# Patient Record
Sex: FEMALE | Race: WHITE | NOT HISPANIC OR LATINO | Employment: OTHER
[De-identification: names, ages, dates, MRNs, and addresses within clinical notes are randomized per-mention and may not be internally consistent; named-entity substitution may affect disease eponyms.]

---

## 2017-02-09 ENCOUNTER — RECORDS - HEALTHEAST (OUTPATIENT)
Dept: ADMINISTRATIVE | Facility: OTHER | Age: 57
End: 2017-02-09

## 2017-03-22 ENCOUNTER — COMMUNICATION - HEALTHEAST (OUTPATIENT)
Dept: INTERNAL MEDICINE | Facility: CLINIC | Age: 57
End: 2017-03-22

## 2017-03-22 DIAGNOSIS — E03.9 HYPOTHYROIDISM: ICD-10-CM

## 2017-04-04 ENCOUNTER — COMMUNICATION - HEALTHEAST (OUTPATIENT)
Dept: INTERNAL MEDICINE | Facility: CLINIC | Age: 57
End: 2017-04-04

## 2017-04-05 ENCOUNTER — COMMUNICATION - HEALTHEAST (OUTPATIENT)
Dept: INTERNAL MEDICINE | Facility: CLINIC | Age: 57
End: 2017-04-05

## 2017-04-05 ENCOUNTER — OFFICE VISIT - HEALTHEAST (OUTPATIENT)
Dept: INTERNAL MEDICINE | Facility: CLINIC | Age: 57
End: 2017-04-05

## 2017-04-05 DIAGNOSIS — R10.2 PELVIC PAIN IN FEMALE: ICD-10-CM

## 2017-04-05 DIAGNOSIS — R14.0 BLOATED ABDOMEN: ICD-10-CM

## 2017-04-05 DIAGNOSIS — R30.0 DYSURIA: ICD-10-CM

## 2017-04-05 ASSESSMENT — MIFFLIN-ST. JEOR: SCORE: 1346.99

## 2017-04-21 ENCOUNTER — RECORDS - HEALTHEAST (OUTPATIENT)
Dept: ADMINISTRATIVE | Facility: OTHER | Age: 57
End: 2017-04-21

## 2017-04-21 LAB
HPV_EXT - HISTORICAL: NORMAL
PAP SMEAR - HIM PATIENT REPORTED: NORMAL

## 2017-04-23 ENCOUNTER — RECORDS - HEALTHEAST (OUTPATIENT)
Dept: ADMINISTRATIVE | Facility: OTHER | Age: 57
End: 2017-04-23

## 2017-05-22 ENCOUNTER — RECORDS - HEALTHEAST (OUTPATIENT)
Dept: ADMINISTRATIVE | Facility: OTHER | Age: 57
End: 2017-05-22

## 2017-06-19 ENCOUNTER — COMMUNICATION - HEALTHEAST (OUTPATIENT)
Dept: INTERNAL MEDICINE | Facility: CLINIC | Age: 57
End: 2017-06-19

## 2017-06-19 DIAGNOSIS — E03.9 HYPOTHYROIDISM: ICD-10-CM

## 2017-08-21 ENCOUNTER — COMMUNICATION - HEALTHEAST (OUTPATIENT)
Dept: INTERNAL MEDICINE | Facility: CLINIC | Age: 57
End: 2017-08-21

## 2017-08-21 DIAGNOSIS — B00.1 COLD SORE: ICD-10-CM

## 2017-10-03 ENCOUNTER — COMMUNICATION - HEALTHEAST (OUTPATIENT)
Dept: UROLOGY | Facility: CLINIC | Age: 57
End: 2017-10-03

## 2017-10-03 ENCOUNTER — HOSPITAL ENCOUNTER (OUTPATIENT)
Dept: CT IMAGING | Facility: HOSPITAL | Age: 57
Discharge: HOME | End: 2017-10-03
Attending: PHYSICIAN ASSISTANT

## 2017-10-03 ENCOUNTER — COMMUNICATION - HEALTHEAST (OUTPATIENT)
Dept: INTERNAL MEDICINE | Facility: CLINIC | Age: 57
End: 2017-10-03

## 2017-10-03 DIAGNOSIS — N20.9 URINARY TRACT STONES: ICD-10-CM

## 2017-10-03 DIAGNOSIS — B00.1 COLD SORE: ICD-10-CM

## 2017-12-11 ENCOUNTER — COMMUNICATION - HEALTHEAST (OUTPATIENT)
Dept: INTERNAL MEDICINE | Facility: CLINIC | Age: 57
End: 2017-12-11

## 2017-12-11 DIAGNOSIS — E03.9 HYPOTHYROIDISM: ICD-10-CM

## 2017-12-30 ENCOUNTER — COMMUNICATION - HEALTHEAST (OUTPATIENT)
Dept: INTERNAL MEDICINE | Facility: CLINIC | Age: 57
End: 2017-12-30

## 2017-12-30 DIAGNOSIS — E03.9 HYPOTHYROIDISM: ICD-10-CM

## 2017-12-30 DIAGNOSIS — B00.1 COLD SORE: ICD-10-CM

## 2018-01-04 ENCOUNTER — COMMUNICATION - HEALTHEAST (OUTPATIENT)
Dept: INTERNAL MEDICINE | Facility: CLINIC | Age: 58
End: 2018-01-04

## 2018-01-08 ENCOUNTER — OFFICE VISIT - HEALTHEAST (OUTPATIENT)
Dept: INTERNAL MEDICINE | Facility: CLINIC | Age: 58
End: 2018-01-08

## 2018-01-08 DIAGNOSIS — M25.552 PAIN OF BOTH HIP JOINTS: ICD-10-CM

## 2018-01-08 DIAGNOSIS — E03.9 HYPOTHYROIDISM: ICD-10-CM

## 2018-01-08 DIAGNOSIS — M79.645 PAIN IN FINGER OF BOTH HANDS: ICD-10-CM

## 2018-01-08 DIAGNOSIS — M79.644 PAIN IN FINGER OF BOTH HANDS: ICD-10-CM

## 2018-01-08 DIAGNOSIS — Z12.31 VISIT FOR SCREENING MAMMOGRAM: ICD-10-CM

## 2018-01-08 DIAGNOSIS — M79.673 FOOT PAIN: ICD-10-CM

## 2018-01-08 DIAGNOSIS — M25.551 PAIN OF BOTH HIP JOINTS: ICD-10-CM

## 2018-01-08 LAB
ERYTHROCYTE [DISTWIDTH] IN BLOOD BY AUTOMATED COUNT: 12.6 % (ref 11–14.5)
ERYTHROCYTE [SEDIMENTATION RATE] IN BLOOD BY WESTERGREN METHOD: 10 MM/HR (ref 0–20)
HCT VFR BLD AUTO: 44.8 % (ref 35–47)
HGB BLD-MCNC: 14.8 G/DL (ref 12–16)
MCH RBC QN AUTO: 30.5 PG (ref 27–34)
MCHC RBC AUTO-ENTMCNC: 33.1 G/DL (ref 32–36)
MCV RBC AUTO: 92 FL (ref 80–100)
PLATELET # BLD AUTO: 205 THOU/UL (ref 140–440)
PMV BLD AUTO: 8.2 FL (ref 7–10)
RBC # BLD AUTO: 4.86 MILL/UL (ref 3.8–5.4)
WBC: 9.3 THOU/UL (ref 4–11)

## 2018-01-08 ASSESSMENT — MIFFLIN-ST. JEOR: SCORE: 1419.57

## 2018-01-09 LAB
C REACTIVE PROTEIN LHE: 0.3 MG/DL (ref 0–0.8)
CCP AB SER IA-ACNC: 0.7 U/ML
RHEUMATOID FACT SERPL-ACNC: <15 IU/ML (ref 0–30)
T4 FREE SERPL-MCNC: 0.9 NG/DL (ref 0.7–1.8)
TSH SERPL DL<=0.005 MIU/L-ACNC: 0.8 UIU/ML (ref 0.3–5)
URATE SERPL-MCNC: 5.4 MG/DL (ref 2–7.5)

## 2018-01-10 ENCOUNTER — COMMUNICATION - HEALTHEAST (OUTPATIENT)
Dept: INTERNAL MEDICINE | Facility: CLINIC | Age: 58
End: 2018-01-10

## 2018-01-11 ENCOUNTER — RECORDS - HEALTHEAST (OUTPATIENT)
Dept: ADMINISTRATIVE | Facility: OTHER | Age: 58
End: 2018-01-11

## 2018-01-12 ENCOUNTER — HOSPITAL ENCOUNTER (OUTPATIENT)
Dept: MAMMOGRAPHY | Facility: HOSPITAL | Age: 58
Discharge: HOME | End: 2018-01-12
Attending: INTERNAL MEDICINE

## 2018-01-12 DIAGNOSIS — Z12.31 VISIT FOR SCREENING MAMMOGRAM: ICD-10-CM

## 2018-01-16 ENCOUNTER — RECORDS - HEALTHEAST (OUTPATIENT)
Dept: ADMINISTRATIVE | Facility: OTHER | Age: 58
End: 2018-01-16

## 2018-01-22 ENCOUNTER — HOSPITAL ENCOUNTER (OUTPATIENT)
Dept: MRI IMAGING | Facility: HOSPITAL | Age: 58
Discharge: HOME | End: 2018-01-22

## 2018-01-22 DIAGNOSIS — M25.552 LEFT HIP PAIN: ICD-10-CM

## 2018-01-24 ENCOUNTER — AMBULATORY - HEALTHEAST (OUTPATIENT)
Dept: SCHEDULING | Facility: CLINIC | Age: 58
End: 2018-01-24

## 2018-01-24 ENCOUNTER — RECORDS - HEALTHEAST (OUTPATIENT)
Dept: ADMINISTRATIVE | Facility: OTHER | Age: 58
End: 2018-01-24

## 2018-01-24 DIAGNOSIS — M84.352A STRESS FRACTURE OF LEFT HIP: ICD-10-CM

## 2018-02-08 ENCOUNTER — COMMUNICATION - HEALTHEAST (OUTPATIENT)
Dept: INTERNAL MEDICINE | Facility: CLINIC | Age: 58
End: 2018-02-08

## 2018-02-12 ENCOUNTER — AMBULATORY - HEALTHEAST (OUTPATIENT)
Dept: LAB | Facility: CLINIC | Age: 58
End: 2018-02-12

## 2018-02-12 DIAGNOSIS — M25.551 RIGHT HIP PAIN: ICD-10-CM

## 2018-02-13 LAB — 25(OH)D3 SERPL-MCNC: 16.9 NG/ML (ref 30–80)

## 2018-02-20 ENCOUNTER — RECORDS - HEALTHEAST (OUTPATIENT)
Dept: ADMINISTRATIVE | Facility: OTHER | Age: 58
End: 2018-02-20

## 2018-02-20 ENCOUNTER — COMMUNICATION - HEALTHEAST (OUTPATIENT)
Dept: INTERNAL MEDICINE | Facility: CLINIC | Age: 58
End: 2018-02-20

## 2018-02-26 ENCOUNTER — COMMUNICATION - HEALTHEAST (OUTPATIENT)
Dept: INTERNAL MEDICINE | Facility: CLINIC | Age: 58
End: 2018-02-26

## 2018-02-28 ENCOUNTER — COMMUNICATION - HEALTHEAST (OUTPATIENT)
Dept: INTERNAL MEDICINE | Facility: CLINIC | Age: 58
End: 2018-02-28

## 2018-02-28 ENCOUNTER — HOSPITAL ENCOUNTER (OUTPATIENT)
Dept: MRI IMAGING | Facility: HOSPITAL | Age: 58
Discharge: HOME | End: 2018-02-28

## 2018-02-28 DIAGNOSIS — M25.552 LEFT HIP PAIN: ICD-10-CM

## 2018-03-02 ENCOUNTER — HOSPITAL ENCOUNTER (OUTPATIENT)
Dept: CT IMAGING | Facility: HOSPITAL | Age: 58
Discharge: HOME | End: 2018-03-02

## 2018-03-02 ENCOUNTER — RECORDS - HEALTHEAST (OUTPATIENT)
Dept: ADMINISTRATIVE | Facility: OTHER | Age: 58
End: 2018-03-02

## 2018-03-02 DIAGNOSIS — M84.352A STRESS FRACTURE OF LEFT HIP: ICD-10-CM

## 2018-03-05 ENCOUNTER — COMMUNICATION - HEALTHEAST (OUTPATIENT)
Dept: INTERNAL MEDICINE | Facility: CLINIC | Age: 58
End: 2018-03-05

## 2018-03-05 ENCOUNTER — RECORDS - HEALTHEAST (OUTPATIENT)
Dept: ADMINISTRATIVE | Facility: OTHER | Age: 58
End: 2018-03-05

## 2018-03-06 ENCOUNTER — OFFICE VISIT - HEALTHEAST (OUTPATIENT)
Dept: INTERNAL MEDICINE | Facility: CLINIC | Age: 58
End: 2018-03-06

## 2018-03-06 DIAGNOSIS — Z01.818 PRE-OP EXAMINATION: ICD-10-CM

## 2018-03-06 DIAGNOSIS — M85.80 OSTEOPENIA: ICD-10-CM

## 2018-03-06 DIAGNOSIS — M84.352A STRESS FRACTURE OF LEFT HIP: ICD-10-CM

## 2018-03-06 DIAGNOSIS — E03.9 HYPOTHYROIDISM: ICD-10-CM

## 2018-03-06 LAB
ANION GAP SERPL CALCULATED.3IONS-SCNC: 9 MMOL/L (ref 5–18)
ATRIAL RATE - MUSE: 85 BPM
BUN SERPL-MCNC: 13 MG/DL (ref 8–22)
CALCIUM SERPL-MCNC: 9.8 MG/DL (ref 8.5–10.5)
CHLORIDE BLD-SCNC: 108 MMOL/L (ref 98–107)
CO2 SERPL-SCNC: 26 MMOL/L (ref 22–31)
CREAT SERPL-MCNC: 0.75 MG/DL (ref 0.6–1.1)
DIASTOLIC BLOOD PRESSURE - MUSE: NORMAL MMHG
ERYTHROCYTE [DISTWIDTH] IN BLOOD BY AUTOMATED COUNT: 13.5 % (ref 11–14.5)
GFR SERPL CREATININE-BSD FRML MDRD: >60 ML/MIN/1.73M2
GLUCOSE BLD-MCNC: 92 MG/DL (ref 70–125)
HCT VFR BLD AUTO: 45.7 % (ref 35–47)
HGB BLD-MCNC: 15.3 G/DL (ref 12–16)
INTERPRETATION ECG - MUSE: NORMAL
MCH RBC QN AUTO: 30.7 PG (ref 27–34)
MCHC RBC AUTO-ENTMCNC: 33.4 G/DL (ref 32–36)
MCV RBC AUTO: 92 FL (ref 80–100)
P AXIS - MUSE: 22 DEGREES
PLATELET # BLD AUTO: 180 THOU/UL (ref 140–440)
PMV BLD AUTO: 7.9 FL (ref 7–10)
POTASSIUM BLD-SCNC: 4.7 MMOL/L (ref 3.5–5)
PR INTERVAL - MUSE: 132 MS
QRS DURATION - MUSE: 78 MS
QT - MUSE: 362 MS
QTC - MUSE: 430 MS
R AXIS - MUSE: 22 DEGREES
RBC # BLD AUTO: 4.98 MILL/UL (ref 3.8–5.4)
SODIUM SERPL-SCNC: 143 MMOL/L (ref 136–145)
SYSTOLIC BLOOD PRESSURE - MUSE: NORMAL MMHG
T AXIS - MUSE: 12 DEGREES
VENTRICULAR RATE- MUSE: 85 BPM
WBC: 12.9 THOU/UL (ref 4–11)

## 2018-03-06 ASSESSMENT — MIFFLIN-ST. JEOR: SCORE: 1424.11

## 2018-03-08 ENCOUNTER — RECORDS - HEALTHEAST (OUTPATIENT)
Dept: ADMINISTRATIVE | Facility: OTHER | Age: 58
End: 2018-03-08

## 2018-03-27 ENCOUNTER — COMMUNICATION - HEALTHEAST (OUTPATIENT)
Dept: INTERNAL MEDICINE | Facility: CLINIC | Age: 58
End: 2018-03-27

## 2018-04-02 ENCOUNTER — COMMUNICATION - HEALTHEAST (OUTPATIENT)
Dept: INTERNAL MEDICINE | Facility: CLINIC | Age: 58
End: 2018-04-02

## 2018-04-02 DIAGNOSIS — E03.9 HYPOTHYROIDISM: ICD-10-CM

## 2018-04-02 DIAGNOSIS — G47.00 INSOMNIA: ICD-10-CM

## 2018-04-02 DIAGNOSIS — F41.9 ANXIETY: ICD-10-CM

## 2018-04-18 ENCOUNTER — RECORDS - HEALTHEAST (OUTPATIENT)
Dept: ADMINISTRATIVE | Facility: OTHER | Age: 58
End: 2018-04-18

## 2018-05-16 ENCOUNTER — OFFICE VISIT - HEALTHEAST (OUTPATIENT)
Dept: INTERNAL MEDICINE | Facility: CLINIC | Age: 58
End: 2018-05-16

## 2018-05-16 DIAGNOSIS — E03.9 HYPOTHYROIDISM: ICD-10-CM

## 2018-05-16 DIAGNOSIS — M85.80 OSTEOPENIA: ICD-10-CM

## 2018-05-16 DIAGNOSIS — M84.352A STRESS FRACTURE OF LEFT FEMUR: ICD-10-CM

## 2018-05-16 DIAGNOSIS — B00.1 COLD SORE: ICD-10-CM

## 2018-05-16 ASSESSMENT — MIFFLIN-ST. JEOR: SCORE: 1419.57

## 2018-05-17 ENCOUNTER — COMMUNICATION - HEALTHEAST (OUTPATIENT)
Dept: INTERNAL MEDICINE | Facility: CLINIC | Age: 58
End: 2018-05-17

## 2018-05-17 LAB
25(OH)D3 SERPL-MCNC: 29.3 NG/ML (ref 30–80)
ANION GAP SERPL CALCULATED.3IONS-SCNC: 8 MMOL/L (ref 5–18)
BUN SERPL-MCNC: 16 MG/DL (ref 8–22)
CALCIUM SERPL-MCNC: 9.7 MG/DL (ref 8.5–10.5)
CHLORIDE BLD-SCNC: 107 MMOL/L (ref 98–107)
CO2 SERPL-SCNC: 27 MMOL/L (ref 22–31)
CREAT SERPL-MCNC: 0.74 MG/DL (ref 0.6–1.1)
GFR SERPL CREATININE-BSD FRML MDRD: >60 ML/MIN/1.73M2
GLUCOSE BLD-MCNC: 94 MG/DL (ref 70–125)
POTASSIUM BLD-SCNC: 4.2 MMOL/L (ref 3.5–5)
SODIUM SERPL-SCNC: 142 MMOL/L (ref 136–145)
T4 FREE SERPL-MCNC: 0.9 NG/DL (ref 0.7–1.8)
TSH SERPL DL<=0.005 MIU/L-ACNC: 0.69 UIU/ML (ref 0.3–5)

## 2018-05-26 ENCOUNTER — COMMUNICATION - HEALTHEAST (OUTPATIENT)
Dept: INTERNAL MEDICINE | Facility: CLINIC | Age: 58
End: 2018-05-26

## 2018-05-30 ENCOUNTER — RECORDS - HEALTHEAST (OUTPATIENT)
Dept: ADMINISTRATIVE | Facility: OTHER | Age: 58
End: 2018-05-30

## 2018-06-01 ENCOUNTER — HOSPITAL ENCOUNTER (OUTPATIENT)
Dept: CT IMAGING | Facility: HOSPITAL | Age: 58
Discharge: HOME | End: 2018-06-01
Attending: PHYSICIAN ASSISTANT

## 2018-06-01 DIAGNOSIS — S72.002A: ICD-10-CM

## 2018-06-08 ENCOUNTER — COMMUNICATION - HEALTHEAST (OUTPATIENT)
Dept: INTERNAL MEDICINE | Facility: CLINIC | Age: 58
End: 2018-06-08

## 2018-06-08 DIAGNOSIS — E03.9 HYPOTHYROIDISM: ICD-10-CM

## 2018-07-18 ENCOUNTER — RECORDS - HEALTHEAST (OUTPATIENT)
Dept: ADMINISTRATIVE | Facility: OTHER | Age: 58
End: 2018-07-18

## 2018-07-19 ENCOUNTER — COMMUNICATION - HEALTHEAST (OUTPATIENT)
Dept: INTERNAL MEDICINE | Facility: CLINIC | Age: 58
End: 2018-07-19

## 2018-07-19 DIAGNOSIS — F41.9 ANXIETY: ICD-10-CM

## 2018-08-08 ENCOUNTER — COMMUNICATION - HEALTHEAST (OUTPATIENT)
Dept: SCHEDULING | Facility: CLINIC | Age: 58
End: 2018-08-08

## 2018-08-09 ENCOUNTER — OFFICE VISIT - HEALTHEAST (OUTPATIENT)
Dept: INTERNAL MEDICINE | Facility: CLINIC | Age: 58
End: 2018-08-09

## 2018-08-09 ENCOUNTER — RECORDS - HEALTHEAST (OUTPATIENT)
Dept: GENERAL RADIOLOGY | Facility: CLINIC | Age: 58
End: 2018-08-09

## 2018-08-09 DIAGNOSIS — M81.0 OSTEOPOROSIS: ICD-10-CM

## 2018-08-09 DIAGNOSIS — M81.0 AGE-RELATED OSTEOPOROSIS WITHOUT CURRENT PATHOLOGICAL FRACTURE: ICD-10-CM

## 2018-08-09 ASSESSMENT — MIFFLIN-ST. JEOR: SCORE: 1396.89

## 2018-09-24 ENCOUNTER — COMMUNICATION - HEALTHEAST (OUTPATIENT)
Dept: INTERNAL MEDICINE | Facility: CLINIC | Age: 58
End: 2018-09-24

## 2018-09-24 DIAGNOSIS — G47.00 INSOMNIA: ICD-10-CM

## 2018-11-06 ENCOUNTER — RECORDS - HEALTHEAST (OUTPATIENT)
Dept: ADMINISTRATIVE | Facility: OTHER | Age: 58
End: 2018-11-06

## 2018-11-14 ENCOUNTER — RECORDS - HEALTHEAST (OUTPATIENT)
Dept: ADMINISTRATIVE | Facility: OTHER | Age: 58
End: 2018-11-14

## 2018-12-12 ENCOUNTER — COMMUNICATION - HEALTHEAST (OUTPATIENT)
Dept: INTERNAL MEDICINE | Facility: CLINIC | Age: 58
End: 2018-12-12

## 2018-12-12 DIAGNOSIS — B00.1 COLD SORE: ICD-10-CM

## 2018-12-13 ENCOUNTER — OFFICE VISIT - HEALTHEAST (OUTPATIENT)
Dept: INTERNAL MEDICINE | Facility: CLINIC | Age: 58
End: 2018-12-13

## 2018-12-13 DIAGNOSIS — K11.21 PAROTITIS, ACUTE: ICD-10-CM

## 2018-12-13 ASSESSMENT — MIFFLIN-ST. JEOR: SCORE: 1396.89

## 2019-02-12 ENCOUNTER — OFFICE VISIT - HEALTHEAST (OUTPATIENT)
Dept: INTERNAL MEDICINE | Facility: CLINIC | Age: 59
End: 2019-02-12

## 2019-02-12 DIAGNOSIS — K21.9 GASTROESOPHAGEAL REFLUX DISEASE WITHOUT ESOPHAGITIS: ICD-10-CM

## 2019-02-12 DIAGNOSIS — K13.70 LESION OF ORAL MUCOSA: ICD-10-CM

## 2019-02-12 DIAGNOSIS — F41.1 ANXIETY STATE: ICD-10-CM

## 2019-02-12 DIAGNOSIS — M85.88 OSTEOPENIA OF SPINE: ICD-10-CM

## 2019-02-12 DIAGNOSIS — R03.0 PREHYPERTENSION: ICD-10-CM

## 2019-02-12 DIAGNOSIS — E03.9 ACQUIRED HYPOTHYROIDISM: ICD-10-CM

## 2019-02-12 ASSESSMENT — MIFFLIN-ST. JEOR: SCORE: 1419.57

## 2019-02-25 ENCOUNTER — COMMUNICATION - HEALTHEAST (OUTPATIENT)
Dept: SCHEDULING | Facility: CLINIC | Age: 59
End: 2019-02-25

## 2019-02-26 ENCOUNTER — OFFICE VISIT - HEALTHEAST (OUTPATIENT)
Dept: INTERNAL MEDICINE | Facility: CLINIC | Age: 59
End: 2019-02-26

## 2019-02-26 ENCOUNTER — COMMUNICATION - HEALTHEAST (OUTPATIENT)
Dept: SCHEDULING | Facility: CLINIC | Age: 59
End: 2019-02-26

## 2019-02-26 DIAGNOSIS — I10 ESSENTIAL HYPERTENSION: ICD-10-CM

## 2019-02-26 DIAGNOSIS — F41.1 ANXIETY STATE: ICD-10-CM

## 2019-02-26 ASSESSMENT — MIFFLIN-ST. JEOR: SCORE: 1405.96

## 2019-03-07 ENCOUNTER — COMMUNICATION - HEALTHEAST (OUTPATIENT)
Dept: INTERNAL MEDICINE | Facility: CLINIC | Age: 59
End: 2019-03-07

## 2019-03-07 DIAGNOSIS — F41.9 ANXIETY: ICD-10-CM

## 2019-03-18 ENCOUNTER — COMMUNICATION - HEALTHEAST (OUTPATIENT)
Dept: INTERNAL MEDICINE | Facility: CLINIC | Age: 59
End: 2019-03-18

## 2019-03-18 DIAGNOSIS — G47.00 INSOMNIA: ICD-10-CM

## 2019-03-20 ENCOUNTER — COMMUNICATION - HEALTHEAST (OUTPATIENT)
Dept: INTERNAL MEDICINE | Facility: CLINIC | Age: 59
End: 2019-03-20

## 2019-03-21 ENCOUNTER — OFFICE VISIT - HEALTHEAST (OUTPATIENT)
Dept: INTERNAL MEDICINE | Facility: CLINIC | Age: 59
End: 2019-03-21

## 2019-03-21 DIAGNOSIS — K13.79 LUMP IN MOUTH: ICD-10-CM

## 2019-03-21 DIAGNOSIS — I10 ESSENTIAL HYPERTENSION: ICD-10-CM

## 2019-03-21 DIAGNOSIS — J34.89 INTERNAL NASAL LESION: ICD-10-CM

## 2019-03-21 DIAGNOSIS — E03.9 ACQUIRED HYPOTHYROIDISM: ICD-10-CM

## 2019-03-21 RX ORDER — TRIAMCINOLONE ACETONIDE 5 MG/G
OINTMENT TOPICAL 2 TIMES DAILY
Qty: 30 G | Refills: 1 | Status: SHIPPED | OUTPATIENT
Start: 2019-03-21 | End: 2023-04-19

## 2019-03-21 ASSESSMENT — MIFFLIN-ST. JEOR: SCORE: 1415.03

## 2019-04-10 ENCOUNTER — COMMUNICATION - HEALTHEAST (OUTPATIENT)
Dept: INTERNAL MEDICINE | Facility: CLINIC | Age: 59
End: 2019-04-10

## 2019-04-10 DIAGNOSIS — B00.1 COLD SORE: ICD-10-CM

## 2019-04-25 ENCOUNTER — COMMUNICATION - HEALTHEAST (OUTPATIENT)
Dept: SCHEDULING | Facility: CLINIC | Age: 59
End: 2019-04-25

## 2019-04-29 ENCOUNTER — COMMUNICATION - HEALTHEAST (OUTPATIENT)
Dept: SCHEDULING | Facility: CLINIC | Age: 59
End: 2019-04-29

## 2019-04-29 ENCOUNTER — OFFICE VISIT - HEALTHEAST (OUTPATIENT)
Dept: INTERNAL MEDICINE | Facility: CLINIC | Age: 59
End: 2019-04-29

## 2019-04-29 DIAGNOSIS — I10 ESSENTIAL HYPERTENSION: ICD-10-CM

## 2019-04-29 DIAGNOSIS — K21.9 GASTROESOPHAGEAL REFLUX DISEASE WITHOUT ESOPHAGITIS: ICD-10-CM

## 2019-04-29 DIAGNOSIS — F41.9 ANXIETY: ICD-10-CM

## 2019-04-29 ASSESSMENT — MIFFLIN-ST. JEOR: SCORE: 1387.82

## 2019-05-02 ENCOUNTER — COMMUNICATION - HEALTHEAST (OUTPATIENT)
Dept: INTERNAL MEDICINE | Facility: CLINIC | Age: 59
End: 2019-05-02

## 2019-05-02 DIAGNOSIS — G47.00 INSOMNIA: ICD-10-CM

## 2019-05-12 ENCOUNTER — COMMUNICATION - HEALTHEAST (OUTPATIENT)
Dept: INTERNAL MEDICINE | Facility: CLINIC | Age: 59
End: 2019-05-12

## 2019-05-12 DIAGNOSIS — E03.9 HYPOTHYROIDISM: ICD-10-CM

## 2019-05-23 ENCOUNTER — HOSPITAL ENCOUNTER (OUTPATIENT)
Dept: CT IMAGING | Facility: HOSPITAL | Age: 59
Discharge: HOME | End: 2019-05-23
Attending: INTERNAL MEDICINE

## 2019-05-23 ENCOUNTER — COMMUNICATION - HEALTHEAST (OUTPATIENT)
Dept: INTERNAL MEDICINE | Facility: CLINIC | Age: 59
End: 2019-05-23

## 2019-05-23 ENCOUNTER — OFFICE VISIT - HEALTHEAST (OUTPATIENT)
Dept: INTERNAL MEDICINE | Facility: CLINIC | Age: 59
End: 2019-05-23

## 2019-05-23 DIAGNOSIS — F41.1 ANXIETY STATE: ICD-10-CM

## 2019-05-23 DIAGNOSIS — R10.31 RIGHT LOWER QUADRANT PAIN: ICD-10-CM

## 2019-05-23 DIAGNOSIS — G47.00 INSOMNIA: ICD-10-CM

## 2019-05-23 DIAGNOSIS — E03.9 ACQUIRED HYPOTHYROIDISM: ICD-10-CM

## 2019-05-23 DIAGNOSIS — I10 ESSENTIAL HYPERTENSION: ICD-10-CM

## 2019-05-23 LAB
ALBUMIN UR-MCNC: NEGATIVE MG/DL
APPEARANCE UR: CLEAR
BILIRUB UR QL STRIP: NEGATIVE
COLOR UR AUTO: YELLOW
GLUCOSE UR STRIP-MCNC: NEGATIVE MG/DL
HGB UR QL STRIP: NEGATIVE
KETONES UR STRIP-MCNC: NEGATIVE MG/DL
LEUKOCYTE ESTERASE UR QL STRIP: NEGATIVE
NITRATE UR QL: NEGATIVE
PH UR STRIP: 6 [PH] (ref 5–8)
SP GR UR STRIP: 1.01 (ref 1–1.03)
UROBILINOGEN UR STRIP-ACNC: NORMAL

## 2019-05-23 ASSESSMENT — MIFFLIN-ST. JEOR: SCORE: 1378.75

## 2019-07-22 ENCOUNTER — COMMUNICATION - HEALTHEAST (OUTPATIENT)
Dept: INTERNAL MEDICINE | Facility: CLINIC | Age: 59
End: 2019-07-22

## 2019-07-22 DIAGNOSIS — F41.1 ANXIETY STATE: ICD-10-CM

## 2019-09-13 ENCOUNTER — COMMUNICATION - HEALTHEAST (OUTPATIENT)
Dept: INTERNAL MEDICINE | Facility: CLINIC | Age: 59
End: 2019-09-13

## 2019-09-13 DIAGNOSIS — F41.1 ANXIETY STATE: ICD-10-CM

## 2019-10-01 ENCOUNTER — RECORDS - HEALTHEAST (OUTPATIENT)
Dept: ADMINISTRATIVE | Facility: OTHER | Age: 59
End: 2019-10-01

## 2019-11-11 ENCOUNTER — COMMUNICATION - HEALTHEAST (OUTPATIENT)
Dept: INTERNAL MEDICINE | Facility: CLINIC | Age: 59
End: 2019-11-11

## 2019-11-11 DIAGNOSIS — I10 ESSENTIAL HYPERTENSION: ICD-10-CM

## 2020-02-28 ENCOUNTER — COMMUNICATION - HEALTHEAST (OUTPATIENT)
Dept: INTERNAL MEDICINE | Facility: CLINIC | Age: 60
End: 2020-02-28

## 2020-02-28 DIAGNOSIS — F41.1 ANXIETY STATE: ICD-10-CM

## 2020-03-12 ENCOUNTER — COMMUNICATION - HEALTHEAST (OUTPATIENT)
Dept: INTERNAL MEDICINE | Facility: CLINIC | Age: 60
End: 2020-03-12

## 2020-03-12 DIAGNOSIS — F41.9 ANXIETY: ICD-10-CM

## 2020-03-12 DIAGNOSIS — E03.9 HYPOTHYROIDISM: ICD-10-CM

## 2020-03-30 ENCOUNTER — OFFICE VISIT - HEALTHEAST (OUTPATIENT)
Dept: INTERNAL MEDICINE | Facility: CLINIC | Age: 60
End: 2020-03-30

## 2020-03-30 DIAGNOSIS — F41.1 ANXIETY STATE: ICD-10-CM

## 2020-03-30 DIAGNOSIS — I10 ESSENTIAL HYPERTENSION: ICD-10-CM

## 2020-04-22 ENCOUNTER — OFFICE VISIT - HEALTHEAST (OUTPATIENT)
Dept: INTERNAL MEDICINE | Facility: CLINIC | Age: 60
End: 2020-04-22

## 2020-04-22 DIAGNOSIS — F51.02 ADJUSTMENT INSOMNIA: ICD-10-CM

## 2020-04-22 DIAGNOSIS — Z12.31 VISIT FOR SCREENING MAMMOGRAM: ICD-10-CM

## 2020-04-22 DIAGNOSIS — I10 ESSENTIAL HYPERTENSION: ICD-10-CM

## 2020-04-22 DIAGNOSIS — F41.1 ANXIETY STATE: ICD-10-CM

## 2020-04-22 ASSESSMENT — PATIENT HEALTH QUESTIONNAIRE - PHQ9: SUM OF ALL RESPONSES TO PHQ QUESTIONS 1-9: 0

## 2020-05-22 ENCOUNTER — COMMUNICATION - HEALTHEAST (OUTPATIENT)
Dept: INTERNAL MEDICINE | Facility: CLINIC | Age: 60
End: 2020-05-22

## 2020-05-22 DIAGNOSIS — F51.02 ADJUSTMENT INSOMNIA: ICD-10-CM

## 2020-07-05 ENCOUNTER — COMMUNICATION - HEALTHEAST (OUTPATIENT)
Dept: INTERNAL MEDICINE | Facility: CLINIC | Age: 60
End: 2020-07-05

## 2020-07-05 DIAGNOSIS — B00.1 COLD SORE: ICD-10-CM

## 2020-07-05 DIAGNOSIS — F41.9 ANXIETY: ICD-10-CM

## 2020-08-13 ENCOUNTER — RECORDS - HEALTHEAST (OUTPATIENT)
Dept: HEALTH INFORMATION MANAGEMENT | Facility: CLINIC | Age: 60
End: 2020-08-13

## 2020-09-15 ENCOUNTER — COMMUNICATION - HEALTHEAST (OUTPATIENT)
Dept: INTERNAL MEDICINE | Facility: CLINIC | Age: 60
End: 2020-09-15

## 2020-09-16 ENCOUNTER — OFFICE VISIT - HEALTHEAST (OUTPATIENT)
Dept: INTERNAL MEDICINE | Facility: CLINIC | Age: 60
End: 2020-09-16

## 2020-09-16 DIAGNOSIS — K13.0 CRACKED LIPS: ICD-10-CM

## 2020-09-16 DIAGNOSIS — B00.1 COLD SORE: ICD-10-CM

## 2020-10-01 ENCOUNTER — COMMUNICATION - HEALTHEAST (OUTPATIENT)
Dept: INTERNAL MEDICINE | Facility: CLINIC | Age: 60
End: 2020-10-01

## 2020-10-08 ENCOUNTER — COMMUNICATION - HEALTHEAST (OUTPATIENT)
Dept: INTERNAL MEDICINE | Facility: CLINIC | Age: 60
End: 2020-10-08

## 2020-10-23 ENCOUNTER — COMMUNICATION - HEALTHEAST (OUTPATIENT)
Dept: INTERNAL MEDICINE | Facility: CLINIC | Age: 60
End: 2020-10-23

## 2020-10-23 DIAGNOSIS — F41.1 ANXIETY STATE: ICD-10-CM

## 2020-10-23 RX ORDER — LORAZEPAM 0.5 MG/1
TABLET ORAL
Qty: 90 TABLET | Refills: 0 | Status: SHIPPED | OUTPATIENT
Start: 2020-10-23 | End: 2021-10-05

## 2020-12-06 ENCOUNTER — COMMUNICATION - HEALTHEAST (OUTPATIENT)
Dept: INTERNAL MEDICINE | Facility: CLINIC | Age: 60
End: 2020-12-06

## 2020-12-06 DIAGNOSIS — B00.1 COLD SORE: ICD-10-CM

## 2021-01-08 ENCOUNTER — COMMUNICATION - HEALTHEAST (OUTPATIENT)
Dept: INTERNAL MEDICINE | Facility: CLINIC | Age: 61
End: 2021-01-08

## 2021-01-08 DIAGNOSIS — F41.9 ANXIETY: ICD-10-CM

## 2021-01-08 RX ORDER — SERTRALINE HYDROCHLORIDE 100 MG/1
TABLET, FILM COATED ORAL
Qty: 180 TABLET | Refills: 2 | Status: SHIPPED | OUTPATIENT
Start: 2021-01-08 | End: 2021-10-14

## 2021-02-22 ENCOUNTER — COMMUNICATION - HEALTHEAST (OUTPATIENT)
Dept: INTERNAL MEDICINE | Facility: CLINIC | Age: 61
End: 2021-02-22

## 2021-03-09 ENCOUNTER — COMMUNICATION - HEALTHEAST (OUTPATIENT)
Dept: INTERNAL MEDICINE | Facility: CLINIC | Age: 61
End: 2021-03-09

## 2021-03-09 DIAGNOSIS — B00.1 COLD SORE: ICD-10-CM

## 2021-03-10 RX ORDER — VALACYCLOVIR HYDROCHLORIDE 1 G/1
TABLET, FILM COATED ORAL
Qty: 24 TABLET | Refills: 0 | Status: SHIPPED | OUTPATIENT
Start: 2021-03-10 | End: 2022-02-25

## 2021-04-16 ENCOUNTER — COMMUNICATION - HEALTHEAST (OUTPATIENT)
Dept: INTERNAL MEDICINE | Facility: CLINIC | Age: 61
End: 2021-04-16

## 2021-04-16 ENCOUNTER — RECORDS - HEALTHEAST (OUTPATIENT)
Dept: ADMINISTRATIVE | Facility: OTHER | Age: 61
End: 2021-04-16

## 2021-04-16 DIAGNOSIS — I10 ESSENTIAL HYPERTENSION: ICD-10-CM

## 2021-04-16 DIAGNOSIS — E03.9 HYPOTHYROIDISM: ICD-10-CM

## 2021-04-16 RX ORDER — LEVOTHYROXINE SODIUM 50 UG/1
TABLET ORAL
Qty: 90 TABLET | Refills: 0 | Status: SHIPPED | OUTPATIENT
Start: 2021-04-16 | End: 2021-07-20

## 2021-04-16 RX ORDER — ATENOLOL 50 MG/1
TABLET ORAL
Qty: 90 TABLET | Refills: 0 | Status: SHIPPED | OUTPATIENT
Start: 2021-04-16 | End: 2021-07-20

## 2021-04-30 ENCOUNTER — RECORDS - HEALTHEAST (OUTPATIENT)
Dept: ADMINISTRATIVE | Facility: OTHER | Age: 61
End: 2021-04-30

## 2021-05-19 ENCOUNTER — RECORDS - HEALTHEAST (OUTPATIENT)
Dept: ADMINISTRATIVE | Facility: OTHER | Age: 61
End: 2021-05-19

## 2021-05-24 ENCOUNTER — RECORDS - HEALTHEAST (OUTPATIENT)
Dept: ADMINISTRATIVE | Facility: OTHER | Age: 61
End: 2021-05-24

## 2021-05-25 ENCOUNTER — RECORDS - HEALTHEAST (OUTPATIENT)
Dept: ADMINISTRATIVE | Facility: CLINIC | Age: 61
End: 2021-05-25

## 2021-05-26 ENCOUNTER — RECORDS - HEALTHEAST (OUTPATIENT)
Dept: ADMINISTRATIVE | Facility: CLINIC | Age: 61
End: 2021-05-26

## 2021-05-26 VITALS — DIASTOLIC BLOOD PRESSURE: 77 MMHG | SYSTOLIC BLOOD PRESSURE: 150 MMHG

## 2021-05-26 ASSESSMENT — PATIENT HEALTH QUESTIONNAIRE - PHQ9: SUM OF ALL RESPONSES TO PHQ QUESTIONS 1-9: 0

## 2021-05-27 ENCOUNTER — RECORDS - HEALTHEAST (OUTPATIENT)
Dept: ADMINISTRATIVE | Facility: CLINIC | Age: 61
End: 2021-05-27

## 2021-05-27 NOTE — TELEPHONE ENCOUNTER
Refill Approved    Rx renewed per Medication Renewal Policy. Medication was last renewed on 12/14/08.    Carrie Mariano, [facility] Triage/Med Refill 4/11/2019     Requested Prescriptions   Pending Prescriptions Disp Refills     valACYclovir (VALTREX) 1000 MG tablet [Pharmacy Med Name: VALACYCLOVIR 1GM TABLETS] 24 tablet 0     Sig: TAKE 2 TABLETS BY MOUTH EVERY 12 HOURS AS NEEDED       Antivirals Refill Protocol Passed - 4/10/2019 11:24 AM        Passed - Renal function done in last year     Creatinine   Date Value Ref Range Status   02/25/2019 0.81 0.60 - 1.10 mg/dL Final             Passed - Visit with PCP or prescribing provider visit in past 12 months or next 3 months     Last office visit with prescriber/PCP: 3/21/2019 Chandrakant Jack MD OR same dept: 3/21/2019 Chandrakant Jack MD OR same specialty: 3/21/2019 Chandrakant Jack MD  Last physical: 3/6/2018 Last MTM visit: Visit date not found   Next visit within 3 mo: Visit date not found  Next physical within 3 mo: Visit date not found  Prescriber OR PCP: Chandrakant Jack MD  Last diagnosis associated with med order: 1. Cold sore  - valACYclovir (VALTREX) 1000 MG tablet [Pharmacy Med Name: VALACYCLOVIR 1GM TABLETS]; TAKE 2 TABLETS BY MOUTH EVERY 12 HOURS AS NEEDED  Dispense: 24 tablet; Refill: 0    If protocol passes may refill for 12 months if within 3 months of last provider visit (or a total of 15 months).

## 2021-05-28 ENCOUNTER — RECORDS - HEALTHEAST (OUTPATIENT)
Dept: ADMINISTRATIVE | Facility: CLINIC | Age: 61
End: 2021-05-28

## 2021-05-28 NOTE — PROGRESS NOTES
Office Visit - Follow Up   Jonn Watson   59 y.o. female    Date of Visit: 4/29/2019    Chief Complaint   Patient presents with     Hypertension     partner commited suicide last week, BP running 170-180/70-80's        Assessment and Plan   1. Essential hypertension  Not ideally controlled but responding slowly to her clonidine patch.  Had initial side effects from clonidine but they are now resolving.  Feels her arms and legs were weak, feeling of low or no energy when she started using her clonidine patch.  Her blood pressures were going up and down which are aggravated by her anxiety and stress because her  partner committed suicide just a few days ago.  Will add metoprolol low-dose to clonidine patch.  - metoprolol succinate (TOPROL-XL) 25 MG; Take 1 tablet (25 mg total) by mouth daily.  Dispense: 90 tablet; Refill: 3    2. Anxiety  Anxiety flared up because of recent death of her significant other who committed suicide 5 days ago after being despondent and depressed.  Now she is seeing a clinical therapist weekly for her anxiety.  Will continue sertraline 100 mg daily and lorazepam as needed.  Advised she can take her lorazepam 2-3 times a day as needed.    3. Gastroesophageal reflux disease without esophagitis  Has increased of her GERD symptoms due to her anxiety.  But already takes ranitidine which helps.      Follow up in 3 weeks.     History of Present Illness   This 59 y.o. old female here for follow-up.  Has hypertension.  Cannot tolerate losartan because of nausea and vomiting.  Was changed to clonidine patch.  Initially had some side effect of blunted and pads having weakness of her arms and legs.  Feels like does not have energy on her arms and leg but this gradually dissipated.  Blood pressure still not ideally controlled.  Got aggravated when she was stressed out by the death of her partner who committed suicide 5 days ago.  Now seeing a clinical therapist and taking her sertraline together with  her as needed lorazepam.  Needs additional treatment for her blood pressure.  Has GERD controlled by ranitidine.  Has but there is more acid reflux due to her stress and anxiety.  Overall, stable.    Review of Systems   A 12 point comprehensive review of systems was negative except as noted..     Medications, Allergies and Problem List   Reviewed and updated             Chief Complaint   Hypertension (partner commited suicide last week, BP running 170-180/70-80's)       Patient Profile   Social History     Social History Narrative    Single, homosexual, has partner for years. Smokes cigarettes, off and on. Non alcohol drinker. 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        Past Medical History   Patient Active Problem List   Diagnosis     Iron Deficiency     Arthropathy Of Multiple Sites     Fatigue     Midback Pain     Hypothyroidism     Arthropathy Of The Ankle / Foot     Esophageal Reflux     Nephrolithiasis     Anxiety     Dizziness     Hiatal Hernia     Nontoxic Solitary Thyroid Nodule       Past Surgical History  She has a past surgical history that includes Appendectomy; Ureteroscopy; Diagnostic laparoscopy; Toe Fusion (Left); Cystoscopy w/ ureteroscopy; and Cystoscopy w/ ureteral stent placement (Left, 11/11/2016).       Medications and Allergies   Current Outpatient Medications   Medication Sig     calcium carbonate-vitamin D3 (CALCIUM 600 WITH VITAMIN D3) 600 mg(1,500mg) -400 unit cap Take by mouth.     cloNIDine (CATAPRES-TTS) 0.1 mg/24 hr Place 1 patch on the skin once a week.     hydrOXYzine HCl (ATARAX) 10 MG tablet TAKE 1/2 TO 1 TABLET BY MOUTH AT BEDTIME AS NEEDED     levothyroxine (SYNTHROID, LEVOTHROID) 50 MCG tablet Take 1 tablet (50 mcg total) by mouth Daily at 6:00 am.     LORazepam (ATIVAN) 0.5 MG tablet Take 1 tablet (0.5 mg total) by mouth 2 (two) times a day as needed for anxiety.     ranitidine (ZANTAC) 150 MG capsule Take 150 mg by mouth daily as needed for heartburn.      "sertraline (ZOLOFT) 100 MG tablet TAKE 2 TABLETS(200 MG) BY MOUTH DAILY     triamcinolone (KENALOG) 0.5 % ointment Apply topically 2 (two) times a day.     valACYclovir (VALTREX) 1000 MG tablet TAKE 2 TABLETS BY MOUTH EVERY 12 HOURS AS NEEDED     metoprolol succinate (TOPROL-XL) 25 MG Take 1 tablet (25 mg total) by mouth daily.     Allergies   Allergen Reactions     Erythromycin Base Anaphylaxis and Swelling     Sores in mouth also.     Losartan Other (See Comments)     General malaize     Sulfa (Sulfonamide Antibiotics) Rash        Family and Social History   Family History   Problem Relation Age of Onset     Hypertension Mother      Breast cancer Mother 73     Heart disease Father      Hypertension Father      Aneurysm Sister      Hypertension Brother      Depression Brother      Hypertension Sister      Depression Sister      Hypertension Maternal Grandfather      Gout Paternal Grandmother      Hypertension Paternal Grandmother      Urolithiasis Paternal Grandfather         RECURRENT     Heart disease Paternal Grandfather      Hypertension Paternal Grandfather         Social History     Tobacco Use     Smoking status: Current Every Day Smoker     Smokeless tobacco: Former User     Tobacco comment: Casual smoker by hx   Substance Use Topics     Alcohol use: No     Drug use: No        Physical Exam       Physical Exam  BP (!) 182/100   Pulse (!) 110   Ht 5' 5\" (1.651 m)   Wt 179 lb (81.2 kg)   SpO2 98%   BMI 29.79 kg/m    General appearance: alert, appears stated age, cooperative and no distress  Head: Normocephalic, without obvious abnormality, atraumatic  Throat: lips, mucosa, and tongue normal; teeth and gums normal  Neck: no adenopathy, no carotid bruit, no JVD, supple, symmetrical, trachea midline and thyroid not enlarged, symmetric, no tenderness/mass/nodules  Lungs: clear to auscultation bilaterally  Heart: regular rate and rhythm, S1, S2 normal, no murmur, click, rub or gallop  Abdomen: soft, " non-tender; bowel sounds normal; no masses,  no organomegaly  Extremities: extremities normal, atraumatic, no cyanosis or edema  Skin: Skin color, texture, turgor normal. No rashes or lesions  Psychiatric: Stressed and anxious     Additional Information        Chandrakant Jack MD  Internal Medicine  Contact me at [phone]     Additional Information   Current Outpatient Medications   Medication Sig     calcium carbonate-vitamin D3 (CALCIUM 600 WITH VITAMIN D3) 600 mg(1,500mg) -400 unit cap Take by mouth.     cloNIDine (CATAPRES-TTS) 0.1 mg/24 hr Place 1 patch on the skin once a week.     hydrOXYzine HCl (ATARAX) 10 MG tablet TAKE 1/2 TO 1 TABLET BY MOUTH AT BEDTIME AS NEEDED     levothyroxine (SYNTHROID, LEVOTHROID) 50 MCG tablet Take 1 tablet (50 mcg total) by mouth Daily at 6:00 am.     LORazepam (ATIVAN) 0.5 MG tablet Take 1 tablet (0.5 mg total) by mouth 2 (two) times a day as needed for anxiety.     ranitidine (ZANTAC) 150 MG capsule Take 150 mg by mouth daily as needed for heartburn.     sertraline (ZOLOFT) 100 MG tablet TAKE 2 TABLETS(200 MG) BY MOUTH DAILY     triamcinolone (KENALOG) 0.5 % ointment Apply topically 2 (two) times a day.     valACYclovir (VALTREX) 1000 MG tablet TAKE 2 TABLETS BY MOUTH EVERY 12 HOURS AS NEEDED     metoprolol succinate (TOPROL-XL) 25 MG Take 1 tablet (25 mg total) by mouth daily.     Allergies   Allergen Reactions     Erythromycin Base Anaphylaxis and Swelling     Sores in mouth also.     Losartan Other (See Comments)     General malaize     Sulfa (Sulfonamide Antibiotics) Rash     Social History     Tobacco Use     Smoking status: Current Every Day Smoker     Smokeless tobacco: Former User     Tobacco comment: Casual smoker by hx   Substance Use Topics     Alcohol use: No     Drug use: No         Time: total time spent with the patient was 25 minutes of which >50% was spent in counseling and coordination of care

## 2021-05-28 NOTE — TELEPHONE ENCOUNTER
Patient has appointment to see Dr. Jack this afternoon at 2:20 pm.  Elizabeth RAJAN CMA/MACK....................11:57 AM

## 2021-05-28 NOTE — TELEPHONE ENCOUNTER
RN triage   Call from pt   Pt states she has been trying to manage BP -- last med change a couple weeks ago -- to clonidine patch -- feeling lots of fatigue and low energy and nausea  Pt states her partner committed suicide yesterday -- an feeling ' weird' and ' shaky' -- ' maybe feeling anxous'   Recent BP ==4/21 BP = 148/80 - 154/93 - 4/22 BP = 168/96-  4/23 = 186/99 - 174/98 -   Now = 167/99  P= 104   No chest pain -- no difficulty breathing - no unsteadiness or dizzy - no slurred speech   Pt states she does have some vision changes - eyes burning - seein a little more ' fuzzy' than usual -- pt thinks she needs glasses -- has not had vision checked  Per protocol = should go to ED - check w/ PCP first   Please advise =  When and where do you want pt seen  Pt will take lorazepam now -- and will call insurance to find counselor  Mile Herrera RN [facility] RN triage      Reason for Disposition    Systolic BP >= 160 OR Diastolic >= 100, and any cardiac or neurologic symptoms (e.g., chest pain, difficulty breathing, unsteady gait, blurred vision)    Protocols used: HIGH BLOOD PRESSURE-A-OH

## 2021-05-28 NOTE — TELEPHONE ENCOUNTER
"Pt calls with feedback on recently elevated BP readings following partner's suicide 4/24/19.    Current /78.  However pt states \"It's often 140s/90s.\"  \"Had to take a lorazepam tablet once in awhile.\"  No hypertensive symptoms at this time.    Pt states \"I don't like clonidine patches -> \"They make me feel like a noodle (fatigued).\"  Recommended clinical eval to review BP med options.  Pt agrees.  Warm transferred to a  for this purpose now.    Felipa Anna RN BSBA  [facility] RN Triage     Reason for Disposition    Systolic BP >= 140 OR Diastolic >= 90, and is taking BP medications    Protocols used: HIGH BLOOD PRESSURE-A-OH      "

## 2021-05-28 NOTE — TELEPHONE ENCOUNTER
Refill Approved    Rx renewed per Medication Renewal Policy. Medication was last renewed on 4/4/18.    Carrie Mariano, [facility] Triage/Med Refill 5/13/2019     Requested Prescriptions   Pending Prescriptions Disp Refills     levothyroxine (SYNTHROID, LEVOTHROID) 50 MCG tablet [Pharmacy Med Name: LEVOTHYROXINE 0.05MG (50MCG) TAB] 90 tablet 0     Sig: TAKE 1 TABLET BY MOUTH EVERY DAY AT 6:00AM       Thyroid Hormones Protocol Passed - 5/12/2019 10:17 AM        Passed - Provider visit in past 12 months or next 3 months     Last office visit with prescriber/PCP: 4/29/2019 Chandrakant Jack MD OR same dept: 4/29/2019 Chandrakant Jack MD OR same specialty: 4/29/2019 Chandrakant Jack MD  Last physical: 3/6/2018 Last MTM visit: Visit date not found   Next visit within 3 mo: Visit date not found  Next physical within 3 mo: Visit date not found  Prescriber OR PCP: Chandrakant Jack MD  Last diagnosis associated with med order: 1. Hypothyroidism  - levothyroxine (SYNTHROID, LEVOTHROID) 50 MCG tablet [Pharmacy Med Name: LEVOTHYROXINE 0.05MG (50MCG) TAB]; TAKE 1 TABLET BY MOUTH EVERY DAY AT 6:00AM  Dispense: 90 tablet; Refill: 0    If protocol passes may refill for 12 months if within 3 months of last provider visit (or a total of 15 months).             Passed - TSH on file in past 12 months for patient age 12 & older     TSH   Date Value Ref Range Status   02/25/2019 1.45 0.30 - 5.00 uIU/mL Final

## 2021-05-29 ENCOUNTER — RECORDS - HEALTHEAST (OUTPATIENT)
Dept: ADMINISTRATIVE | Facility: CLINIC | Age: 61
End: 2021-05-29

## 2021-05-29 NOTE — PROGRESS NOTES
Office Visit - Follow Up   Jonn Watson   59 y.o. female    Date of Visit: 5/23/2019    Chief Complaint   Patient presents with     Groin Pain     1 week ago, low back pain, went to  and wait was to long, wants to leave urine sample today      Headache     glass paper weight fell on base of neck, causing headaches      Hypertension     getting nausea, fatigue in the mornings         Assessment and Plan   1. Right lower quadrant pain  Complains of right lower quadrant pains quite intense in the last 7 days, coming off and on.  Does not have dysuria or frequency or other urinary symptoms.  Has history of kidney stones in November 2016.  Underwent cystoscopy with left ureteroscopy and laser lithotripsy by Dr. Isabel for kidney stones.  Suspect she has recurrence of kidney stone but but this time may be involving the right kidney than the left kidney.  Check her urinalysis and this was bland are negative.  Will do CT abdomen and pelvis without oral and IV contrast, stone protocol.  In the  meantime can take oxycodone with acetaminophen every 6 hours as needed for pains.  - Urinalysis- if Indicated  - CT Abdomen Pelvis Without Oral Without IV Contrast; Future  - oxyCODONE-acetaminophen (PERCOCET/ENDOCET) 5-325 mg per tablet; Take 1 tablet by mouth every 6 (six) hours as needed for pain.  Dispense: 20 tablet; Refill: 0    2. Essential hypertension  Still not ideally controlled but improved with clonidine patch and metoprolol.  Needs a bit of adjustment of her metoprolol dose.  Will increase metoprolol to 25 mg twice a day and continue with clonidine patch 0.1 mg/ 24 hr weekly.  - metoprolol succinate (TOPROL-XL) 25 MG; Take 1 tablet (25 mg total) by mouth 2 (two) times a day.  Dispense: 60 tablet; Refill: 6    3. Acquired hypothyroidism  Supplemented by levothyroxine.  Last thyroid function was normal.  Continue same dose of levothyroxine.    4. Anxiety  Still gets anxious frequently.  Still recovering from the  recent suicide of her significant other.  Continue lorazepam 0.5 mg 3 times a day as needed.  Also continue sertraline 200 mg daily for depression.   - LORazepam (ATIVAN) 0.5 MG tablet; Take 1 tablet (0.5 mg total) by mouth 3 (three) times a day as needed for anxiety.  Dispense: 60 tablet; Refill: 0    5. Insomnia  Still has insomnia because of her anxiety.  Takes hydroxyzine.  Needs refill.  - hydrOXYzine HCl (ATARAX) 10 MG tablet; TAKE 1/2 TO 1 TABLET BY MOUTH AT BEDTIME AS NEEDED  Dispense: 90 tablet; Refill: 3      Follow up in in 1 week.  Informed she may need to see kidney stone clinic in care of Dr. Isabel after her abdominal CT scan is done.     History of Present Illness   This 59 y.o. old female  complains of recurring right lower quadrant pains for 1 week now.  Pains come off and on quite intense.  Apparently she  went to an urgent care but left without being seen because the wait was too long.  Denies urinary symptoms like dysuria and frequency.  Still anxious but not quite depressed anymore which were aggravated by recent suicide of her significant other 1 month ago.  Takes lorazepam  as needed and sertraline daily.  Has hypertension now improved by combination of clonidine patch and metoprolol but still it is not ideally controlled.  Checks her blood pressure at home and reports it runs in the 140s to 150s over 80s.  Initial blood pressure on this visit was 178/98 and on my recheck it came down to 150/90.  Has hypothyroidism supplemented by levothyroxine. Remains anxious and stressed.  No other complaints.    Review of Systems   A 12 point comprehensive review of systems was negative except as noted..     Medications, Allergies and Problem List   Reviewed and updated             Chief Complaint   Groin Pain (1 week ago, low back pain, went to  and wait was to long, wants to leave urine sample today ); Headache (glass paper weight fell on base of neck, causing headaches ); and Hypertension (getting  nausea, fatigue in the mornings )       Patient Profile   Social History     Social History Narrative    Single, homosexual, has partner for years. Smokes cigarettes, off and on. Non alcohol drinker. 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        Past Medical History   Patient Active Problem List   Diagnosis     Iron Deficiency     Arthropathy Of Multiple Sites     Fatigue     Midback Pain     Hypothyroidism     Hypertension     Arthropathy Of The Ankle / Foot     Esophageal Reflux     Nephrolithiasis     Anxiety     Dizziness     Hiatal Hernia     Nontoxic Solitary Thyroid Nodule       Past Surgical History  She has a past surgical history that includes Appendectomy; Ureteroscopy; Diagnostic laparoscopy; Toe Fusion (Left); Cystoscopy w/ ureteroscopy; and Cystoscopy w/ ureteral stent placement (Left, 11/11/2016).       Medications and Allergies   Current Outpatient Medications   Medication Sig     calcium carbonate-vitamin D3 (CALCIUM 600 WITH VITAMIN D3) 600 mg(1,500mg) -400 unit cap Take by mouth.     cloNIDine (CATAPRES-TTS) 0.1 mg/24 hr Place 1 patch on the skin once a week.     hydrOXYzine HCl (ATARAX) 10 MG tablet TAKE 1/2 TO 1 TABLET BY MOUTH AT BEDTIME AS NEEDED     levothyroxine (SYNTHROID, LEVOTHROID) 50 MCG tablet TAKE 1 TABLET BY MOUTH EVERY DAY AT 6:00AM     LORazepam (ATIVAN) 0.5 MG tablet Take 1 tablet (0.5 mg total) by mouth 3 (three) times a day as needed for anxiety.     metoprolol succinate (TOPROL-XL) 25 MG Take 1 tablet (25 mg total) by mouth 2 (two) times a day.     ranitidine (ZANTAC) 150 MG capsule Take 150 mg by mouth daily as needed for heartburn.     sertraline (ZOLOFT) 100 MG tablet TAKE 2 TABLETS(200 MG) BY MOUTH DAILY     triamcinolone (KENALOG) 0.5 % ointment Apply topically 2 (two) times a day.     valACYclovir (VALTREX) 1000 MG tablet TAKE 2 TABLETS BY MOUTH EVERY 12 HOURS AS NEEDED     oxyCODONE-acetaminophen (PERCOCET/ENDOCET) 5-325 mg per tablet Take 1 tablet by mouth  "every 6 (six) hours as needed for pain.     Allergies   Allergen Reactions     Erythromycin Base Anaphylaxis and Swelling     Sores in mouth also.     Losartan Other (See Comments)     General malaize     Sulfa (Sulfonamide Antibiotics) Rash        Family and Social History   Family History   Problem Relation Age of Onset     Hypertension Mother      Breast cancer Mother 73     Heart disease Father      Hypertension Father      Aneurysm Sister      Hypertension Brother      Depression Brother      Hypertension Sister      Depression Sister      Hypertension Maternal Grandfather      Gout Paternal Grandmother      Hypertension Paternal Grandmother      Urolithiasis Paternal Grandfather         RECURRENT     Heart disease Paternal Grandfather      Hypertension Paternal Grandfather         Social History     Tobacco Use     Smoking status: Current Every Day Smoker     Smokeless tobacco: Former User     Tobacco comment: Casual smoker by hx   Substance Use Topics     Alcohol use: No     Drug use: No        Physical Exam       Physical Exam  /90   Pulse 96   Ht 5' 5\" (1.651 m)   Wt 177 lb (80.3 kg)   SpO2 98%   BMI 29.45 kg/m    General appearance: alert, appears stated age, cooperative and mild distress  Head: Normocephalic, without obvious abnormality, atraumatic  Throat: lips, mucosa, and tongue normal; teeth and gums normal  Neck: no adenopathy, no carotid bruit, no JVD, supple, symmetrical, trachea midline and thyroid not enlarged, symmetric, no tenderness/mass/nodules  Lungs: clear to auscultation bilaterally  Heart: regular rate and rhythm, S1, S2 normal, no murmur, click, rub or gallop  Abdomen: Tender right lower quadrant, normal bowel sounds, no guarding or rebound  Extremities: extremities normal, atraumatic, no cyanosis or edema  Skin: Skin color, texture, turgor normal. No rashes or lesions  Neurologic: Grossly normal  Psychiatric: Anxious and stressed     Additional Information        Chandrakant SMART" MD [facility]  Internal Medicine  Contact me at [phone]     Additional Information   Current Outpatient Medications   Medication Sig     calcium carbonate-vitamin D3 (CALCIUM 600 WITH VITAMIN D3) 600 mg(1,500mg) -400 unit cap Take by mouth.     cloNIDine (CATAPRES-TTS) 0.1 mg/24 hr Place 1 patch on the skin once a week.     hydrOXYzine HCl (ATARAX) 10 MG tablet TAKE 1/2 TO 1 TABLET BY MOUTH AT BEDTIME AS NEEDED     levothyroxine (SYNTHROID, LEVOTHROID) 50 MCG tablet TAKE 1 TABLET BY MOUTH EVERY DAY AT 6:00AM     LORazepam (ATIVAN) 0.5 MG tablet Take 1 tablet (0.5 mg total) by mouth 3 (three) times a day as needed for anxiety.     metoprolol succinate (TOPROL-XL) 25 MG Take 1 tablet (25 mg total) by mouth 2 (two) times a day.     ranitidine (ZANTAC) 150 MG capsule Take 150 mg by mouth daily as needed for heartburn.     sertraline (ZOLOFT) 100 MG tablet TAKE 2 TABLETS(200 MG) BY MOUTH DAILY     triamcinolone (KENALOG) 0.5 % ointment Apply topically 2 (two) times a day.     valACYclovir (VALTREX) 1000 MG tablet TAKE 2 TABLETS BY MOUTH EVERY 12 HOURS AS NEEDED     oxyCODONE-acetaminophen (PERCOCET/ENDOCET) 5-325 mg per tablet Take 1 tablet by mouth every 6 (six) hours as needed for pain.     Allergies   Allergen Reactions     Erythromycin Base Anaphylaxis and Swelling     Sores in mouth also.     Losartan Other (See Comments)     General malaize     Sulfa (Sulfonamide Antibiotics) Rash     Social History     Tobacco Use     Smoking status: Current Every Day Smoker     Smokeless tobacco: Former User     Tobacco comment: Casual smoker by hx   Substance Use Topics     Alcohol use: No     Drug use: No         Time: total time spent with the patient was 40 minutes of which >50% was spent in counseling and coordination of care

## 2021-05-30 ENCOUNTER — RECORDS - HEALTHEAST (OUTPATIENT)
Dept: ADMINISTRATIVE | Facility: CLINIC | Age: 61
End: 2021-05-30

## 2021-05-30 VITALS — BODY MASS INDEX: 28.32 KG/M2 | WEIGHT: 170 LBS | HEIGHT: 65 IN

## 2021-05-30 NOTE — TELEPHONE ENCOUNTER
Controlled Substance Refill Request  Medication:   Requested Prescriptions     Pending Prescriptions Disp Refills     LORazepam (ATIVAN) 0.5 MG tablet [Pharmacy Med Name: LORAZEPAM 0.5MG TABLETS] 60 tablet 0     Sig: TAKE 1 TABLET(0.5 MG) BY MOUTH THREE TIMES DAILY AS NEEDED FOR ANXIETY     Date Last Fill: 5/23/19  Pharmacy: [facility]   Submit electronically to pharmacy  Controlled Substance Agreement on File: None  Encounter-Level CSA Scan Date:    There are no encounter-level csa scan date.       Last office visit: 5/23/2019 Chandrakant Jack MD

## 2021-05-30 NOTE — TELEPHONE ENCOUNTER
Prescription Monitoring Program activity reviewed with no discrepancies noted.  Last fill per : 5/24/19    Controlled Substance Agreement on file: No  Date: NA    Last office visit with provider:  5/23/2019 Chandrakant Jack MD    Please advise.

## 2021-05-31 ENCOUNTER — RECORDS - HEALTHEAST (OUTPATIENT)
Dept: ADMINISTRATIVE | Facility: CLINIC | Age: 61
End: 2021-05-31

## 2021-05-31 VITALS — WEIGHT: 186 LBS | BODY MASS INDEX: 30.99 KG/M2 | HEIGHT: 65 IN

## 2021-06-01 ENCOUNTER — RECORDS - HEALTHEAST (OUTPATIENT)
Dept: ADMINISTRATIVE | Facility: CLINIC | Age: 61
End: 2021-06-01

## 2021-06-01 VITALS — HEIGHT: 65 IN | BODY MASS INDEX: 30.16 KG/M2 | WEIGHT: 181 LBS

## 2021-06-01 VITALS — WEIGHT: 187 LBS | HEIGHT: 65 IN | BODY MASS INDEX: 31.16 KG/M2

## 2021-06-01 VITALS — HEIGHT: 65 IN | WEIGHT: 186 LBS | BODY MASS INDEX: 30.99 KG/M2

## 2021-06-01 NOTE — TELEPHONE ENCOUNTER
Controlled Substance Refill Request  Medication:   Requested Prescriptions     Pending Prescriptions Disp Refills     LORazepam (ATIVAN) 0.5 MG tablet [Pharmacy Med Name: LORAZEPAM 0.5MG TABLETS] 60 tablet 0     Sig: TAKE 1 TABLET(0.5 MG) BY MOUTH THREE TIMES DAILY AS NEEDED FOR ANXIETY     Date Last Fill: 7/23/19  Pharmacy: [id number]   Submit electronically to pharmacy  Controlled Substance Agreement on File:   Encounter-Level CSA Scan Date:    There are no encounter-level csa scan date.       Last office visit: Last office visit pertaining to requested medication was 5/23/19.

## 2021-06-02 VITALS — BODY MASS INDEX: 30.16 KG/M2 | HEIGHT: 65 IN | WEIGHT: 181 LBS

## 2021-06-02 VITALS — BODY MASS INDEX: 30.82 KG/M2 | WEIGHT: 185 LBS | HEIGHT: 65 IN

## 2021-06-02 VITALS — HEIGHT: 65 IN | BODY MASS INDEX: 30.49 KG/M2 | WEIGHT: 183 LBS

## 2021-06-02 VITALS — HEIGHT: 65 IN | WEIGHT: 186 LBS | BODY MASS INDEX: 30.99 KG/M2

## 2021-06-03 VITALS — HEIGHT: 65 IN | BODY MASS INDEX: 29.49 KG/M2 | WEIGHT: 177 LBS

## 2021-06-03 VITALS — BODY MASS INDEX: 29.82 KG/M2 | WEIGHT: 179 LBS | HEIGHT: 65 IN

## 2021-06-03 NOTE — TELEPHONE ENCOUNTER
Medication Request  Medication name: metoprolol succinate 25 mg two times a day   Pharmacy Name and Location: [facility] #[id number]  Reason for request: Patient's dose was changed to the about dose on 5/23/2019, but a new prescription was never sent. She has been out of medication and is asking for this to be expedited.   When did you use medication last?:  Within the last 2 weeks.  Patient offered appointment:  patient declined  Okay to leave a detailed message: yes

## 2021-06-06 NOTE — TELEPHONE ENCOUNTER
Controlled Substance Refill Request  Medication Name:   Requested Prescriptions     Pending Prescriptions Disp Refills     LORazepam (ATIVAN) 0.5 MG tablet [Pharmacy Med Name: LORAZEPAM 0.5MG TABLETS] 60 tablet 0     Sig: TAKE 1 TABLET(0.5 MG) BY MOUTH THREE TIMES DAILY AS NEEDED FOR ANXIETY     Date Last Fill: 9/16/19  Requested Pharmacy: Sunday  Submit electronically to pharmacy  Controlled Substance Agreement on file:   Encounter-Level CSA Scan Date:    There are no encounter-level csa scan date.        Last office visit:  5/23/19

## 2021-06-06 NOTE — TELEPHONE ENCOUNTER
RN cannot approve Refill Request    RN can NOT refill this medication Protocol failed and NO refill given.    Carrie Mariano, [facility] Triage/Med Refill 3/14/2020    Requested Prescriptions   Pending Prescriptions Disp Refills     levothyroxine (SYNTHROID, LEVOTHROID) 50 MCG tablet [Pharmacy Med Name: LEVOTHYROXINE 0.05MG (50MCG) TAB] 90 tablet 2     Sig: TAKE 1 TABLET BY MOUTH EVERY DAY AT 6:00AM       Thyroid Hormones Protocol Failed - 3/12/2020 10:42 PM        Failed - TSH on file in past 12 months for patient age 12 & older     TSH   Date Value Ref Range Status   02/25/2019 1.45 0.30 - 5.00 uIU/mL Final                   Passed - Provider visit in past 12 months or next 3 months     Last office visit with prescriber/PCP: 5/23/2019 Chandrakant Jack MD OR same dept: 5/23/2019 Chandrakant Jack MD OR same specialty: 5/23/2019 Chandrakant Jack MD  Last physical: 3/6/2018 Last MTM visit: Visit date not found   Next visit within 3 mo: Visit date not found  Next physical within 3 mo: Visit date not found  Prescriber OR PCP: Chandrakant Jack MD  Last diagnosis associated with med order: 1. Hypothyroidism  - levothyroxine (SYNTHROID, LEVOTHROID) 50 MCG tablet [Pharmacy Med Name: LEVOTHYROXINE 0.05MG (50MCG) TAB]; TAKE 1 TABLET BY MOUTH EVERY DAY AT 6:00AM  Dispense: 90 tablet; Refill: 2    2. Anxiety  - sertraline (ZOLOFT) 100 MG tablet; TAKE 2 TABLETS(200 MG) BY MOUTH DAILY  Dispense: 180 tablet; Refill: 0    If protocol passes may refill for 12 months if within 3 months of last provider visit (or a total of 15 months).              Signed Prescriptions Disp Refills    sertraline (ZOLOFT) 100 MG tablet 180 tablet 0     Sig: TAKE 2 TABLETS(200 MG) BY MOUTH DAILY       SSRI Refill Protocol  Passed - 3/12/2020 10:42 PM        Passed - PCP or prescribing provider visit in last year     Last office visit with prescriber/PCP: 5/23/2019 Chandrakant Jack MD OR same dept: 5/23/2019 Chandrakant Jack MD OR  same specialty: 5/23/2019 Chandrakant Jack MD  Last physical: 3/6/2018 Last MTM visit: Visit date not found   Next visit within 3 mo: Visit date not found  Next physical within 3 mo: Visit date not found  Prescriber OR PCP: Chandrakant Jack MD  Last diagnosis associated with med order: 1. Hypothyroidism  - levothyroxine (SYNTHROID, LEVOTHROID) 50 MCG tablet [Pharmacy Med Name: LEVOTHYROXINE 0.05MG (50MCG) TAB]; TAKE 1 TABLET BY MOUTH EVERY DAY AT 6:00AM  Dispense: 90 tablet; Refill: 2    2. Anxiety  - sertraline (ZOLOFT) 100 MG tablet; TAKE 2 TABLETS(200 MG) BY MOUTH DAILY  Dispense: 180 tablet; Refill: 0    If protocol passes may refill for 12 months if within 3 months of last provider visit (or a total of 15 months).

## 2021-06-06 NOTE — TELEPHONE ENCOUNTER
Refill Approved    Rx renewed per Medication Renewal Policy. Medication was last renewed on 3/10/19.    Carrie Mariano, [facility] Triage/Med Refill 3/14/2020     Requested Prescriptions   Pending Prescriptions Disp Refills     levothyroxine (SYNTHROID, LEVOTHROID) 50 MCG tablet [Pharmacy Med Name: LEVOTHYROXINE 0.05MG (50MCG) TAB] 90 tablet 2     Sig: TAKE 1 TABLET BY MOUTH EVERY DAY AT 6:00AM       Thyroid Hormones Protocol Failed - 3/12/2020 10:42 PM        Failed - TSH on file in past 12 months for patient age 12 & older     TSH   Date Value Ref Range Status   02/25/2019 1.45 0.30 - 5.00 uIU/mL Final                   Passed - Provider visit in past 12 months or next 3 months     Last office visit with prescriber/PCP: 5/23/2019 Chandrakant Jack MD OR same dept: 5/23/2019 Chandrakant Jack MD OR same specialty: 5/23/2019 Chandrakant Jack MD  Last physical: 3/6/2018 Last MTM visit: Visit date not found   Next visit within 3 mo: Visit date not found  Next physical within 3 mo: Visit date not found  Prescriber OR PCP: Chandrakant Jack MD  Last diagnosis associated with med order: 1. Hypothyroidism  - levothyroxine (SYNTHROID, LEVOTHROID) 50 MCG tablet [Pharmacy Med Name: LEVOTHYROXINE 0.05MG (50MCG) TAB]; TAKE 1 TABLET BY MOUTH EVERY DAY AT 6:00AM  Dispense: 90 tablet; Refill: 2    2. Anxiety  - sertraline (ZOLOFT) 100 MG tablet [Pharmacy Med Name: SERTRALINE 100MG TABLETS]; TAKE 2 TABLETS(200 MG) BY MOUTH DAILY  Dispense: 180 tablet; Refill: 3    If protocol passes may refill for 12 months if within 3 months of last provider visit (or a total of 15 months).                sertraline (ZOLOFT) 100 MG tablet [Pharmacy Med Name: SERTRALINE 100MG TABLETS] 180 tablet 3     Sig: TAKE 2 TABLETS(200 MG) BY MOUTH DAILY       SSRI Refill Protocol  Passed - 3/12/2020 10:42 PM        Passed - PCP or prescribing provider visit in last year     Last office visit with prescriber/PCP: 5/23/2019 Chandrakant Jack MD  OR same dept: 5/23/2019 Chandrakant Jack MD OR same specialty: 5/23/2019 Chandrakant Jack MD  Last physical: 3/6/2018 Last MTM visit: Visit date not found   Next visit within 3 mo: Visit date not found  Next physical within 3 mo: Visit date not found  Prescriber OR PCP: Chandrakant Jack MD  Last diagnosis associated with med order: 1. Hypothyroidism  - levothyroxine (SYNTHROID, LEVOTHROID) 50 MCG tablet [Pharmacy Med Name: LEVOTHYROXINE 0.05MG (50MCG) TAB]; TAKE 1 TABLET BY MOUTH EVERY DAY AT 6:00AM  Dispense: 90 tablet; Refill: 2    2. Anxiety  - sertraline (ZOLOFT) 100 MG tablet [Pharmacy Med Name: SERTRALINE 100MG TABLETS]; TAKE 2 TABLETS(200 MG) BY MOUTH DAILY  Dispense: 180 tablet; Refill: 3    If protocol passes may refill for 12 months if within 3 months of last provider visit (or a total of 15 months).

## 2021-06-07 ENCOUNTER — RECORDS - HEALTHEAST (OUTPATIENT)
Dept: ADMINISTRATIVE | Facility: OTHER | Age: 61
End: 2021-06-07

## 2021-06-07 NOTE — PROGRESS NOTES
"Jonn Watson is a 60 y.o. female who is being evaluated via a billable telephone visit.      The patient has been notified of following:     \"This telephone visit will be conducted via a call between you and your physician/provider. We have found that certain health care needs can be provided without the need for a physical exam.  This service lets us provide the care you need with a short phone conversation.  If a prescription is necessary we can send it directly to your pharmacy.  If lab work is needed we can place an order for that and you can then stop by our lab to have the test done at a later time.    If during the course of the call the physician/provider feels a telephone visit is not appropriate, you will not be charged for this service.\"     Physician has received verbal consent for a Telephone Visit from the patient? Yes    Jonn Watson complains of    Chief Complaint   Patient presents with     Follow-up     Blood pressure follow up       I have reviewed and updated the patient's Past Medical History, Social History, Family History and Medication List.    ALLERGIES  Erythromycin base; Losartan; and Sulfa (sulfonamide antibiotics)    Additional provider notes:  Phone visit with Jonn for her increased blood pressure. Still has increased blood pressure in the 180/90. Could not tolerate metoprolol because it makes her tired. Could to tolerate also clonidine patch because it makes her sleepy. In the past, she reports she tolerated atenolol. Will try this because she needs good control of her hypertension. Has anxiety but now controlled by sertraline and as needed lorazepam.     Assessment/Plan:  1. Hypertension  Has uncontrolled blood pressure due to inability to tolerate her previous bp medications. Able to tolerate atenolol in the past. Will try atenolol. Advised to continue checking bp and record readings.  - atenoloL (TENORMIN) 50 MG tablet; Take 1 tablet (50 mg total) by mouth daily.  Dispense: 90 " tablet; Refill: 3    2. Anxiety  Controlled. No flare or exacerbation. Continue daily sertraline and as needed lorazepam.         Phone call duration:  15 minutes    Shawnee Nicole, CMA

## 2021-06-07 NOTE — PROGRESS NOTES
"Jonn Watson is a 60 y.o. female who is being evaluated via a billable telephone visit.      The patient has been notified of following:     \"This telephone visit will be conducted via a call between you and your physician/provider. We have found that certain health care needs can be provided without the need for a physical exam.  This service lets us provide the care you need with a short phone conversation.  If a prescription is necessary we can send it directly to your pharmacy.  If lab work is needed we can place an order for that and you can then stop by our lab to have the test done at a later time.    Telephone visits are billed at different rates depending on your insurance coverage. During this emergency period, for some insurers they may be billed the same as an in-person visit.  Please reach out to your insurance provider with any questions.    If during the course of the call the physician/provider feels a telephone visit is not appropriate, you will not be charged for this service.\"    Patient has given verbal consent to a Telephone visit? Yes      Additional provider notes: Phone visit with Jonn for her hypertension.  Unable to tolerate clonidine and metoprolol.  Was switched to her old medication, atenolol she took and tolerated well in the past.  Reports she does not  experience any side effects from this medication.  Her blood pressure runs in the 140s 150s systolic in the last 2 weeks she has been taking atenolol.  Has adjustment insomnia due to suicide of her significant other almost a year ago.  Now her insomnia is better or improved.  Still gets some anxiety intermittently.  Takes lorazepam as needed.  Overall, she feels she is doing well.    Assessment/Plan:    1. Hypertension  Continue atenolol 50 mg daily.  Advised if her blood pressure starts to increase in the 160 to 180 systolic she  is advised to increase her atenolol to twice a day.    2. Anxiety  Continue lorazepam as needed.    3. " Adjustment insomnia  Continue hydroxyzine at bedtime.  - hydrOXYzine HCL (ATARAX) 10 MG tablet; TAKE 1-1.5 TABLET BY MOUTH AT BEDTIME AS NEEDED  Dispense: 90 tablet; Refill: 3    4. Visit for screening mammogram  Due for screening mammogram.        Phone call duration: 15 minutes    Imelda Gallegos LPN

## 2021-06-08 ENCOUNTER — COMMUNICATION - HEALTHEAST (OUTPATIENT)
Dept: INTERNAL MEDICINE | Facility: CLINIC | Age: 61
End: 2021-06-08

## 2021-06-08 DIAGNOSIS — F51.02 ADJUSTMENT INSOMNIA: ICD-10-CM

## 2021-06-08 NOTE — TELEPHONE ENCOUNTER
RN cannot approve Refill Request    RN can NOT refill this medication PCP messaged that patient is overdue for Office Visit. Last office visit: 5/23/2019 Chandrakant Jack MD Last Physical: 3/6/2018 Last MTM visit: Visit date not found Last visit same specialty: 5/23/2019 Chandrakant Jack MD.  Next visit within 3 mo: Visit date not found  Next physical within 3 mo: Visit date not found      Yesica Leiva, [facility] Triage/Med Refill 5/24/2020    Requested Prescriptions   Pending Prescriptions Disp Refills     hydrOXYzine HCL (ATARAX) 10 MG tablet [Pharmacy Med Name: HYDROXYZINE HCL 10MG TABLETS] 30 tablet 0     Sig: TAKE 1/2 TO 1 TABLET BY MOUTH AT BEDTIME AS NEEDED       Antihistamine Refill Protocol Failed - 5/22/2020  8:15 PM        Failed - Patient has had office visit/physical in last year     Last office visit with prescriber/PCP: 5/23/2019 Chandrakant Jack MD OR same dept: 5/23/2019 Chandrakant Jack MD OR same specialty: 5/23/2019 Chandrakant Jack MD  Last physical: 3/6/2018 Last MTM visit: Visit date not found   Next visit within 3 mo: Visit date not found  Next physical within 3 mo: Visit date not found  Prescriber OR PCP: Chandrakant Jack MD  Last diagnosis associated with med order: 1. Adjustment insomnia  - hydrOXYzine HCL (ATARAX) 10 MG tablet [Pharmacy Med Name: HYDROXYZINE HCL 10MG TABLETS]; TAKE 1/2 TO 1 TABLET BY MOUTH AT BEDTIME AS NEEDED  Dispense: 30 tablet; Refill: 0    If protocol passes may refill for 12 months if within 3 months of last provider visit (or a total of 15 months).

## 2021-06-09 ENCOUNTER — RECORDS - HEALTHEAST (OUTPATIENT)
Dept: ADMINISTRATIVE | Facility: OTHER | Age: 61
End: 2021-06-09

## 2021-06-09 ENCOUNTER — COMMUNICATION - HEALTHEAST (OUTPATIENT)
Dept: INTERNAL MEDICINE | Facility: CLINIC | Age: 61
End: 2021-06-09

## 2021-06-09 DIAGNOSIS — F51.02 ADJUSTMENT INSOMNIA: ICD-10-CM

## 2021-06-09 RX ORDER — HYDROXYZINE HYDROCHLORIDE 10 MG/1
TABLET, FILM COATED ORAL
Qty: 30 TABLET | Refills: 4 | Status: SHIPPED | OUTPATIENT
Start: 2021-06-09 | End: 2021-11-02

## 2021-06-09 NOTE — TELEPHONE ENCOUNTER
RN cannot approve Refill Request    RN can NOT refill this medication PCP messaged that patient is overdue for Labs and Office Visit. Last office visit: 5/23/2019 Chandrakant Jack MD Last Physical: 3/6/2018 Last MTM visit: Visit date not found Last visit same specialty: 5/23/2019 Chandrakant Jack MD.  Next visit within 3 mo: Visit date not found  Next physical within 3 mo: Visit date not found      Yesica Leiva, [facility] Triage/Med Refill 7/5/2020    Requested Prescriptions   Pending Prescriptions Disp Refills     sertraline (ZOLOFT) 100 MG tablet [Pharmacy Med Name: SERTRALINE 100MG TABLETS] 180 tablet 0     Sig: TAKE 2 TABLETS(200 MG) BY MOUTH DAILY       SSRI Refill Protocol  Failed - 7/5/2020  9:57 AM        Failed - PCP or prescribing provider visit in last year     Last office visit with prescriber/PCP: 5/23/2019 Chandrakant Jack MD OR same dept: Visit date not found OR same specialty: 5/23/2019 Chandrakant Jack MD  Last physical: 3/6/2018 Last MTM visit: Visit date not found   Next visit within 3 mo: Visit date not found  Next physical within 3 mo: Visit date not found  Prescriber OR PCP: Chandrakant Jack MD  Last diagnosis associated with med order: 1. Anxiety  - sertraline (ZOLOFT) 100 MG tablet [Pharmacy Med Name: SERTRALINE 100MG TABLETS]; TAKE 2 TABLETS(200 MG) BY MOUTH DAILY  Dispense: 180 tablet; Refill: 0    2. Cold sore  - valACYclovir (VALTREX) 1000 MG tablet [Pharmacy Med Name: VALACYCLOVIR 1GM TABLETS]; TAKE 2 TABLETS BY MOUTH EVERY 12 HOURS AS NEEDED  Dispense: 24 tablet; Refill: 6    If protocol passes may refill for 12 months if within 3 months of last provider visit (or a total of 15 months).                valACYclovir (VALTREX) 1000 MG tablet [Pharmacy Med Name: VALACYCLOVIR 1GM TABLETS] 24 tablet 6     Sig: TAKE 2 TABLETS BY MOUTH EVERY 12 HOURS AS NEEDED       Antivirals Refill Protocol Failed - 7/5/2020  9:57 AM        Failed - Renal function done in last year      Creatinine   Date Value Ref Range Status   02/25/2019 0.81 0.60 - 1.10 mg/dL Final             Failed - Visit with PCP or prescribing provider visit in past 12 months or next 3 months     Last office visit with prescriber/PCP: 5/23/2019 Chandrakant Jack MD OR same dept: Visit date not found OR same specialty: 5/23/2019 Chandrakant Jack MD  Last physical: 3/6/2018 Last MTM visit: Visit date not found   Next visit within 3 mo: Visit date not found  Next physical within 3 mo: Visit date not found  Prescriber OR PCP: Chandrakant Jack MD  Last diagnosis associated with med order: 1. Anxiety  - sertraline (ZOLOFT) 100 MG tablet [Pharmacy Med Name: SERTRALINE 100MG TABLETS]; TAKE 2 TABLETS(200 MG) BY MOUTH DAILY  Dispense: 180 tablet; Refill: 0    2. Cold sore  - valACYclovir (VALTREX) 1000 MG tablet [Pharmacy Med Name: VALACYCLOVIR 1GM TABLETS]; TAKE 2 TABLETS BY MOUTH EVERY 12 HOURS AS NEEDED  Dispense: 24 tablet; Refill: 6    If protocol passes may refill for 12 months if within 3 months of last provider visit (or a total of 15 months).

## 2021-06-09 NOTE — PROGRESS NOTES
Office Visit - Follow Up   Jonn Watson   57 y.o. female    Date of Visit: 4/5/2017    Chief Complaint   Patient presents with     Urinary Tract Infection     Flank pain and fatigue x 5 days. States she has some pelvic pressure, and urgency.         Assessment and Plan   1. Dysuria  Initially she thought she had dysuria.  Call the clinic and wanted to be seen for possible UTI.  But her urinalysis is negative.  On further questioning she does not really have dysuria but more pelvic pain and low back pains.  - Urinalysis-UC if Indicated    2. Pelvic pain in female  Has has more pelvic pains and low back pains.  Also feels bloated at times.  Since her urinalysis is negative, inform her she needs gynecological evaluation especially with her feeling of bloatedness.  Will refer her to gynecology.  Will need also pelvic ultrasound and will let gynecology do this for us.  - Ambulatory referral to Gynecology    3. Bloated abdomen  Has a feeling of bloatedness.  But does not have  appetite and weight loss.  - Ambulatory referral to Gynecology    Follow up  after she sees gynecology.       History of Present Illness   This 57 y.o. old female complains of possible dysuria.  But on further questioning this not really dysuria but more of has been going on for about pelvic pain.  Has been going on 1 and half weeks off and on.  Also feels bloated with this.  Has normal appetite Has not lost weight.  Basically healthy.  No other complaints.    Review of Systems   A 12 point comprehensive review of systems was negative except as noted..     Medications, Allergies and Problem List   Reviewed and updated             Chief Complaint   Urinary Tract Infection (Flank pain and fatigue x 5 days. States she has some pelvic pressure, and urgency. )       Patient Profile   Social History     Social History Narrative    Single, homosexual, has partner for 17 years. Quit smoking. Non alcohol drinker. 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        Past Medical History   Patient Active Problem List   Diagnosis     Iron Deficiency     Arthropathy Of Multiple Sites     Fatigue     Midback Pain     Hypothyroidism     Arthropathy Of The Ankle / Foot     Esophageal Reflux     Nephrolithiasis     Anxiety     Dizziness     Hiatal Hernia     Nontoxic Solitary Thyroid Nodule       Past Surgical History  She has a past surgical history that includes Appendectomy; Ureteroscopy; Diagnostic laparoscopy; Toe Fusion (Left); Cystoscopy w/ ureteroscopy; and Cystoscopy w/ ureteral stent placement (Left, 11/11/2016).       Medications and Allergies   Current Outpatient Prescriptions   Medication Sig     hydrOXYzine (ATARAX) 10 MG tablet TAKE 1/2 TO 1 TABLET BY MOUTH AT BEDTIME AS NEEDED     levothyroxine (SYNTHROID, LEVOTHROID) 50 MCG tablet TAKE 1 TABLET BY MOUTH EVERY DAY     ranitidine (ZANTAC) 150 MG capsule Take 150 mg by mouth daily as needed for heartburn.     sertraline (ZOLOFT) 100 MG tablet Take 200 mg by mouth daily.     valACYclovir (VALTREX) 1000 MG tablet Take 2 tablets (2,000 mg total) by mouth every 12 (twelve) hours as needed.     Allergies   Allergen Reactions     Erythromycin Base Anaphylaxis and Swelling     Sores in mouth also.     Sulfa (Sulfonamide Antibiotics) Rash        Family and Social History   Family History   Problem Relation Age of Onset     Hypertension Mother      Breast cancer Mother      Heart disease Father      Hypertension Father      Aneurysm Sister      Hypertension Brother      Depression Brother      Hypertension Sister      Depression Sister      Hypertension Maternal Grandfather      Gout Paternal Grandmother      Hypertension Paternal Grandmother      Urolithiasis Paternal Grandfather      RECURRENT     Heart disease Paternal Grandfather      Hypertension Paternal Grandfather         Social History   Substance Use Topics     Smoking status: Former Smoker     Smokeless tobacco: Former User      Comment: Casual  "smoker by hx     Alcohol use No        Physical Exam       Physical Exam  /84  Pulse 80  Temp 99.3  F (37.4  C) (Oral)   Resp 18  Ht 5' 5\" (1.651 m)  Wt 170 lb (77.1 kg)  Breastfeeding? No  BMI 28.29 kg/m2  General appearance: alert, appears stated age, cooperative and no distress  Head: Normocephalic, without obvious abnormality, atraumatic  Throat: lips, mucosa, and tongue normal; teeth and gums normal  Neck: no adenopathy, no carotid bruit, no JVD, supple, symmetrical, trachea midline and thyroid not enlarged, symmetric, no tenderness/mass/nodules  Lungs: clear to auscultation bilaterally  Heart: regular rate and rhythm, S1, S2 normal, no murmur, click, rub or gallop  Abdomen: tender pelvic area, normal bowel sounds, no rebound  Extremities: extremities normal, atraumatic, no cyanosis or edema  Skin: Skin color, texture, turgor normal. No rashes or lesions     Additional Information        Chandrakant Jack MD  Internal Medicine  Contact me at [phone]     Additional Information   Current Outpatient Prescriptions   Medication Sig     hydrOXYzine (ATARAX) 10 MG tablet TAKE 1/2 TO 1 TABLET BY MOUTH AT BEDTIME AS NEEDED     levothyroxine (SYNTHROID, LEVOTHROID) 50 MCG tablet TAKE 1 TABLET BY MOUTH EVERY DAY     ranitidine (ZANTAC) 150 MG capsule Take 150 mg by mouth daily as needed for heartburn.     sertraline (ZOLOFT) 100 MG tablet Take 200 mg by mouth daily.     valACYclovir (VALTREX) 1000 MG tablet Take 2 tablets (2,000 mg total) by mouth every 12 (twelve) hours as needed.     Allergies   Allergen Reactions     Erythromycin Base Anaphylaxis and Swelling     Sores in mouth also.     Sulfa (Sulfonamide Antibiotics) Rash     Social History   Substance Use Topics     Smoking status: Former Smoker     Smokeless tobacco: Former User      Comment: Casual smoker by hx     Alcohol use No         Time: total time spent with the patient was 25 minutes of which >50% was spent in counseling and coordination " of care

## 2021-06-11 NOTE — TELEPHONE ENCOUNTER
FYI - Status Update  Who is Calling: Patient  Update: Patient finished Valtrex 3 days ago. Cold sores have improved but cracks in corners of mouth are still present. Lips are still red and burn, especially after eating spicy food. Please advise.  Okay to leave a detailed message?:  Yes

## 2021-06-11 NOTE — TELEPHONE ENCOUNTER
Left a detailed message with patient that Md is out of the office until Monday. Advised to call back and schedule a VV on Monday .     Imelda Gallegos LPN

## 2021-06-11 NOTE — PROGRESS NOTES
"Jonn Watson is a 60 y.o. female who is being evaluated via a billable telephone visit.      The patient has been notified of following:     \"This telephone visit will be conducted via a call between you and your physician/provider. We have found that certain health care needs can be provided without the need for a physical exam.  This service lets us provide the care you need with a short phone conversation.  If a prescription is necessary we can send it directly to your pharmacy.  If lab work is needed we can place an order for that and you can then stop by our lab to have the test done at a later time.    Telephone visits are billed at different rates depending on your insurance coverage. During this emergency period, for some insurers they may be billed the same as an in-person visit.  Please reach out to your insurance provider with any questions.    If during the course of the call the physician/provider feels a telephone visit is not appropriate, you will not be charged for this service.\"    Patient has given verbal consent to a Telephone visit? Yes    What phone number would you like to be contacted at? [phone]    Patient would like to receive their AVS by AVS Preference: Tayla.    Additional provider notes:  Phone visit with Jonn. Has recurring cold sore and resolved by valacyclovir taken for 2 days in one day. Took her valacyclovir when she had a flare of cold sore on her upper lip. Noted cracking of her lips. But cold sore has dried up. Likes to know if she needs to take extra dose of valacyclovir for her cracked lips.     Assessment/Plan:  1. Cold sore  Advised she does not need to take extra dose of valacyclovir since her cold sore is resolved.     2. Cracked lips  Due to dry lips. Advised to apply OTC neosporin on the cracked lips 3 times a day to prevent secondary infection. In the meantime advised to increase her fluid intake.         Phone call duration:  12 minutes    Imelda Gallegos LPN    "

## 2021-06-12 NOTE — TELEPHONE ENCOUNTER
Yes it is too late to get this sutured, marrero hour for stitching is within 24 hours from the injury.

## 2021-06-13 NOTE — TELEPHONE ENCOUNTER
RN cannot approve Refill Request    RN can NOT refill this medication Protocol failed and NO refill given. Last office visit: 5/23/2019 Chandrakant Jack MD Last Physical: 3/6/2018 Last MTM visit: Visit date not found Last visit same specialty: 5/23/2019 Chandrakant Jack MD.  Next visit within 3 mo: Visit date not found  Next physical within 3 mo: Visit date not found      Carrie Mariano, [facility] Triage/Med Refill 12/7/2020    Requested Prescriptions   Pending Prescriptions Disp Refills     valACYclovir (VALTREX) 1000 MG tablet [Pharmacy Med Name: VALACYCLOVIR 1GM TABLETS] 24 tablet 1     Sig: TAKE 2 TABLETS BY MOUTH EVERY 12 HOURS AS NEEDED       Antivirals Refill Protocol Failed - 12/6/2020  2:34 PM        Failed - Renal function done in last year     Creatinine   Date Value Ref Range Status   02/25/2019 0.81 0.60 - 1.10 mg/dL Final             Passed - Visit with PCP or prescribing provider visit in past 12 months or next 3 months     Last office visit with prescriber/PCP: 5/23/2019 Chandrakant Jack MD OR same dept: Visit date not found OR same specialty: 5/23/2019 Chandrakant Jack MD  Last physical: 3/6/2018 Last MTM visit: Visit date not found   Next visit within 3 mo: Visit date not found  Next physical within 3 mo: Visit date not found  Prescriber OR PCP: Chandrakant Jack MD  Last diagnosis associated with med order: 1. Cold sore  - valACYclovir (VALTREX) 1000 MG tablet [Pharmacy Med Name: VALACYCLOVIR 1GM TABLETS]; TAKE 2 TABLETS BY MOUTH EVERY 12 HOURS AS NEEDED  Dispense: 24 tablet; Refill: 1    If protocol passes may refill for 12 months if within 3 months of last provider visit (or a total of 15 months).

## 2021-06-14 NOTE — TELEPHONE ENCOUNTER
Refill Approved    Rx renewed per Medication Renewal Policy. Medication was last renewed on 7/6/20.    Carrie Mariano, [facility] Connection Triage/Med Refill 1/8/2021     Requested Prescriptions   Pending Prescriptions Disp Refills     sertraline (ZOLOFT) 100 MG tablet [Pharmacy Med Name: SERTRALINE 100MG TABLETS] 180 tablet 1     Sig: TAKE 2 TABLETS(200 MG) BY MOUTH DAILY       SS[facility] Refill Protocol  Passed - 1/8/2021  5:43 AM        Passed - PCP or prescribing provider visit in last year     Last office visit with prescriber/PCP: 5/23/2019 Chandrakant Jack MD OR same dept: Visit date not found OR same specialty: 5/23/2019 Chandrakant Jack MD  Last physical: 3/6/2018 Last MTM visit: Visit date not found   Next visit within 3 mo: Visit date not found  Next physical within 3 mo: Visit date not found  Prescriber OR PCP: Chandrakant Jack MD  Last diagnosis associated with med order: 1. Anxiety  - sertraline (ZOLOFT) 100 MG tablet [Pharmacy Med Name: SERTRALINE 100MG TABLETS]; TAKE 2 TABLETS(200 MG) BY MOUTH DAILY  Dispense: 180 tablet; Refill: 1    If protocol passes may refill for 12 months if within 3 months of last provider visit (or a total of 15 months).

## 2021-06-15 NOTE — PROGRESS NOTES
Office Visit - Follow Up   Jonn Watson   57 y.o. female    Date of Visit: 1/8/2018    Chief Complaint   Patient presents with     Hip Pain     both     Foot Injury     both painful        Assessment and Plan   1. Pain of both hip joints  Complains of bilateral hip pains more in the left than the right hip.  Has been going on for several months.  Pains come off and on.  Scales her pains at its worst 7 to 8 out of 10.  Suspect due to osteoarthritis or wear and tear.  Informed her of this.  Will try tramadol as needed.  Will refer her to orthopedics for possible cortisone injection to the hips.  Check sed rate, C-reactive protein, uric acid and CBC just in case.  - traMADol (ULTRAM) 50 mg tablet; Take 1 tablet (50 mg total) by mouth every 6 (six) hours as needed for pain.  Dispense: 90 tablet; Refill: 0  - Ambulatory referral to Orthopedic Surgery  - Erythrocyte Sedimentation Rate  - C-Reactive Protein  - Uric Acid  - HM2(CBC w/o Differential)    2. Foot pain  Has bilateral foot pains localized in the heel more in the left  heel than the right heel. Have been going on for 3 months or so.  Scales her pains also at 7 to 8 out of 10.  Will check sed rate C-reactive protein uric acid and CBC.  Will try tramadol.  Will refer her to podiatry.  Suspect she has plantar fasciitis.  - traMADol (ULTRAM) 50 mg tablet; Take 1 tablet (50 mg total) by mouth every 6 (six) hours as needed for pain.  Dispense: 90 tablet; Refill: 0  - Ambulatory referral to Podiatry  - Erythrocyte Sedimentation Rate  - C-Reactive Protein  - Uric Acid  - HM2(CBC w/o Differential)    3. Pain in finger of both hands  Also has aches and pains in her fingers with some curved deformities and nodes.  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  Will  try tramadol.  Will check CCP antibodies and RA factor just in case.  Will see orthopedics for this also.  - traMADol (ULTRAM) 50 mg tablet; Take 1 tablet (50 mg total) by mouth every 6 (six) hours as needed for  pain.  Dispense: 90 tablet; Refill: 0  - Ambulatory referral to Orthopedic Surgery  - Rheumatoid Factor Quant  - CCP Antibodies  - HM2(CBC w/o Differential)    4. Visit for screening mammogram  Due for mammography.  Agrees to have mammography scheduled during this visit.  - Mammo Screening Bilateral; Future    5. Hypothyroidism  Supplemented by levothyroxine 50 mcg daily.  Due for a thyroid function test.  Check TSH and free T4.  - Thyroid Stimulating Hormone (TSH)  - T4, Free    Follow up in 4 months.     History of Present Illness   This 57 y.o. old female is here for follow-up.  Was last seen in April 2017.  Complains today of bilateral hip pains and bilateral heel pains.  Also has some finger pains.  Hip and finger pains are due to osteoarthritis.  Heel pains are due to possible plantar fasciitis.  Have been going on for more than 3 months off and on.  There are times she cannot move or walk well because of her pains.  Has hypothyroidism supplemented by levothyroxine.  Due for her thyroid function test.  Due for mammography.    Review of Systems   A 12 point comprehensive review of systems was negative except as noted..     Medications, Allergies and Problem List   Reviewed and updated             Chief Complaint   Hip Pain (both) and Foot Injury (both painful)       Patient Profile   Social History     Social History Narrative    Single, homosexual, has partner for 17 years. Quit smoking. Non alcohol drinker. 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        Past Medical History   Patient Active Problem List   Diagnosis     Iron Deficiency     Arthropathy Of Multiple Sites     Fatigue     Midback Pain     Hypothyroidism     Arthropathy Of The Ankle / Foot     Esophageal Reflux     Nephrolithiasis     Anxiety     Dizziness     Hiatal Hernia     Nontoxic Solitary Thyroid Nodule       Past Surgical History  She has a past surgical history that includes Appendectomy; Ureteroscopy; Diagnostic laparoscopy; Toe  "Fusion (Left); Cystoscopy w/ ureteroscopy; and Cystoscopy w/ ureteral stent placement (Left, 11/11/2016).       Medications and Allergies   Current Outpatient Prescriptions   Medication Sig     hydrOXYzine (ATARAX) 10 MG tablet TAKE 1/2 TO 1 TABLET BY MOUTH AT BEDTIME AS NEEDED     levothyroxine (SYNTHROID, LEVOTHROID) 50 MCG tablet TAKE 1 TABLET BY MOUTH EVERY DAY     ranitidine (ZANTAC) 150 MG capsule Take 150 mg by mouth daily as needed for heartburn.     sertraline (ZOLOFT) 100 MG tablet Take 2 tablets (200 mg total) by mouth daily.     valACYclovir (VALTREX) 1000 MG tablet TAKE 2 TABLETS BY MOUTH EVERY 12 HOURS AS NEEDED     traMADol (ULTRAM) 50 mg tablet Take 1 tablet (50 mg total) by mouth every 6 (six) hours as needed for pain.     Allergies   Allergen Reactions     Erythromycin Base Anaphylaxis and Swelling     Sores in mouth also.     Sulfa (Sulfonamide Antibiotics) Rash        Family and Social History   Family History   Problem Relation Age of Onset     Hypertension Mother      Breast cancer Mother      Heart disease Father      Hypertension Father      Aneurysm Sister      Hypertension Brother      Depression Brother      Hypertension Sister      Depression Sister      Hypertension Maternal Grandfather      Gout Paternal Grandmother      Hypertension Paternal Grandmother      Urolithiasis Paternal Grandfather      RECURRENT     Heart disease Paternal Grandfather      Hypertension Paternal Grandfather         Social History   Substance Use Topics     Smoking status: Former Smoker     Smokeless tobacco: Former User      Comment: Casual smoker by hx     Alcohol use No        Physical Exam       Physical Exam  /80 (Patient Site: Left Arm, Patient Position: Sitting, Cuff Size: Adult Regular)  Pulse 86  Ht 5' 5\" (1.651 m)  Wt 186 lb (84.4 kg)  SpO2 98%  BMI 30.95 kg/m2  General appearance: alert, appears stated age, cooperative and no distress  Head: Normocephalic, without obvious abnormality, " atraumatic  Throat: lips, mucosa, and tongue normal; teeth and gums normal  Neck: no adenopathy, no carotid bruit, no JVD, supple, symmetrical, trachea midline and thyroid not enlarged, symmetric, no tenderness/mass/nodules  Lungs: clear to auscultation bilaterally  Heart: regular rate and rhythm, S1, S2 normal, no murmur, click, rub or gallop  Abdomen: soft, non-tender; bowel sounds normal; no masses,  no organomegaly  Extremities: extremities normal, atraumatic, no cyanosis or edema  Skin: Skin color, texture, turgor normal. No rashes or lesions  Neurologic: Grossly normal  Musculoskeletal: Tender bilateral heels.  Normal hip joint range of motion.  Curved deformities and presence of nose in most fingers     Additional Information        Chandrakant Jack MD  Internal Medicine  Contact me at [phone]     Additional Information   Current Outpatient Prescriptions   Medication Sig     hydrOXYzine (ATARAX) 10 MG tablet TAKE 1/2 TO 1 TABLET BY MOUTH AT BEDTIME AS NEEDED     levothyroxine (SYNTHROID, LEVOTHROID) 50 MCG tablet TAKE 1 TABLET BY MOUTH EVERY DAY     ranitidine (ZANTAC) 150 MG capsule Take 150 mg by mouth daily as needed for heartburn.     sertraline (ZOLOFT) 100 MG tablet Take 2 tablets (200 mg total) by mouth daily.     valACYclovir (VALTREX) 1000 MG tablet TAKE 2 TABLETS BY MOUTH EVERY 12 HOURS AS NEEDED     traMADol (ULTRAM) 50 mg tablet Take 1 tablet (50 mg total) by mouth every 6 (six) hours as needed for pain.     Allergies   Allergen Reactions     Erythromycin Base Anaphylaxis and Swelling     Sores in mouth also.     Sulfa (Sulfonamide Antibiotics) Rash     Social History   Substance Use Topics     Smoking status: Former Smoker     Smokeless tobacco: Former User      Comment: Casual smoker by hx     Alcohol use No         Time: total time spent with the patient was 25 minutes of which >50% was spent in counseling and coordination of care

## 2021-06-15 NOTE — TELEPHONE ENCOUNTER
RN cannot approve Refill Request    RN can NOT refill this medication PCP messaged that patient is overdue for Labs. Last office visit: 5/23/2019 Chandrakant Jack MD Last Physical: 3/6/2018 Last MTM visit: Visit date not found Last visit same specialty: 5/23/2019 Chandrakant Jack MD.  Next visit within 3 mo: Visit date not found  Next physical within 3 mo: Visit date not found      Alba Melchor, [facility] Triage/Med Refill 3/9/2021    Requested Prescriptions   Pending Prescriptions Disp Refills     valACYclovir (VALTREX) 1000 MG tablet [Pharmacy Med Name: VALACYCLOVIR 1GM TABLETS] 24 tablet 0     Sig: TAKE 2 TABLETS BY MOUTH EVERY 12 HOURS AS NEEDED       Antivirals Refill Protocol Failed - 3/9/2021 11:46 AM        Failed - Renal function done in last year     Creatinine   Date Value Ref Range Status   02/25/2019 0.81 0.60 - 1.10 mg/dL Final             Passed - Visit with PCP or prescribing provider visit in past 12 months or next 3 months     Last office visit with prescriber/PCP: 5/23/2019 Chandrakant Jack MD OR same dept: Visit date not found OR same specialty: 5/23/2019 Chandrakant Jack MD  Last physical: 3/6/2018 Last MTM visit: Visit date not found   Next visit within 3 mo: Visit date not found  Next physical within 3 mo: Visit date not found  Prescriber OR PCP: Chandrakant Jack MD  Last diagnosis associated with med order: 1. Cold sore  - valACYclovir (VALTREX) 1000 MG tablet [Pharmacy Med Name: VALACYCLOVIR 1GM TABLETS]; TAKE 2 TABLETS BY MOUTH EVERY 12 HOURS AS NEEDED  Dispense: 24 tablet; Refill: 0    If protocol passes may refill for 12 months if within 3 months of last provider visit (or a total of 15 months).

## 2021-06-16 ENCOUNTER — COMMUNICATION - HEALTHEAST (OUTPATIENT)
Dept: INTERNAL MEDICINE | Facility: CLINIC | Age: 61
End: 2021-06-16

## 2021-06-16 DIAGNOSIS — B00.1 COLD SORE: ICD-10-CM

## 2021-06-16 RX ORDER — VALACYCLOVIR HYDROCHLORIDE 1 G/1
1000 TABLET, FILM COATED ORAL EVERY 12 HOURS PRN
Qty: 24 TABLET | Refills: 0 | Status: SHIPPED | OUTPATIENT
Start: 2021-06-16 | End: 2022-01-27

## 2021-06-16 NOTE — PROGRESS NOTES
Preoperative Exam    Scheduled Procedure: L hip percutaneous pinning femoral neck  Surgery Date:  3/8/17  Surgery Location: [facility], fax [phone]    Surgeon:  Dr. Ty Acosta    Assessment/Plan:     1. Pre-op examination  Ok to go ahead with her surgery. No contraindications.   - Electrocardiogram Perform and Read  - Basic Metabolic Panel  - HM2(CBC w/o Differential)    2. Stress fracture of left hip  Suffers from stress fracture of the left hip which did not respond to conservative treatment. Saw ortho and was recommend percutaneous pinning.     3. Osteopenia  Found to have osteopenia. Has low vitamin D. Will start high dose vitamin D 50,000 units weekly for 6 weeks after her surgery. Repeat vitamin D level in 6 weeks. Will continue calcium with vitamin d.     4. Hypothyroidism  Supplemented by levothyroxine. Thyroid function in 1/18 was normal. Continue same dose of levothyroxine.      Surgical Procedure Risk: Low (reported cardiac risk generally < 1%)  Have you had prior anesthesia?: Yes  Have you or any family members had a previous anesthesia reaction:  No  Do you or any family members have a history of a clotting or bleeding disorder?: No  Cardiac Risk Assessment: no increased risk for major cardiac complications    Patient approved for surgery with general or local anesthesia.        Functional Status: Independent  Patient plans to recover at home with family.     Subjective:      Jonn Watson is a 58 y.o. female who presents for a preoperative consultation.  This patient is here for a preoperative history and physical prior to her left hip percutaneous pinning of the femoral neck on 3/8/2017 with Dr. Ty Acosta of [facility].  Found to have stress fracture of the left hip with his progress.  Did not respond to conservative treatment.  Has increasing pains of the left hip which preclude her from doing her activities of daily living specifically her  ambulation.  Has to use crutches  to ambulate.  Overall healthy.  Only takes levothyroxine for hypothyroidism and sertraline for major depression which is now in remission.  Denies chest chest pain or shortness of breath.  Does not have urinary bowel symptoms.  Feels well, otherwise.    All other systems reviewed and are negative, other than those listed in the HPI.    Pertinent History  Do you have difficulty breathing or chest pain after walking up a flight of stairs: No  History of obstructive sleep apnea: No  Steroid use in the last 6 months: No  Frequent Aspirin/NSAID use: No  Prior Blood Transfusion: No  Prior Blood Transfusion Reaction: No  If for some reason prior to, during or after the procedure, if it is medically indicated, would you be willing to have a blood transfusion?:  There is no transfusion refusal.    Current Outpatient Prescriptions   Medication Sig Dispense Refill     calcium carbonate-vitamin D3 (CALCIUM 600 WITH VITAMIN D3) 600 mg(1,500mg) -400 unit cap Take by mouth.       hydrOXYzine (ATARAX) 10 MG tablet TAKE 1/2 TO 1 TABLET BY MOUTH AT BEDTIME AS NEEDED 30 tablet 0     levothyroxine (SYNTHROID, LEVOTHROID) 50 MCG tablet TAKE 1 TABLET BY MOUTH EVERY DAY 90 tablet 0     ranitidine (ZANTAC) 150 MG capsule Take 150 mg by mouth daily as needed for heartburn.       sertraline (ZOLOFT) 100 MG tablet TAKE 2 TABLETS BY MOUTH EVERY DAY 60 tablet 0     valACYclovir (VALTREX) 1000 MG tablet TAKE 2 TABLETS BY MOUTH EVERY 12 HOURS AS NEEDED 12 tablet 0     No current facility-administered medications for this visit.         Allergies   Allergen Reactions     Erythromycin Base Anaphylaxis and Swelling     Sores in mouth also.     Sulfa (Sulfonamide Antibiotics) Rash       Patient Active Problem List   Diagnosis     Iron Deficiency     Arthropathy Of Multiple Sites     Fatigue     Midback Pain     Hypothyroidism     Arthropathy Of The Ankle / Foot     Esophageal Reflux     Nephrolithiasis     Anxiety  "    Dizziness     Hiatal Hernia     Nontoxic Solitary Thyroid Nodule       Past Medical History:   Diagnosis Date     Anemia      Anxiety      Bladder infection      Bronchitis      Depression      Disease of thyroid gland      Endometriosis      GERD (gastroesophageal reflux disease)      Hiatal hernia      Hip pain     Right>Left     HL (hearing loss)      Hypertension      Kidney stone        Past Surgical History:   Procedure Laterality Date     APPENDECTOMY       CYSTOSCOPY W/ URETERAL STENT PLACEMENT Left 11/11/2016    Procedure: CYSTOSCOPY, LEFT URETEROSCOPY, STENT INSERTION, STONE EXTRACTION;  Surgeon: Patric Isabel MD;  Location: [facility];  Service:      CYSTOSCOPY W/ URETEROSCOPY       DIAGNOSTIC LAPAROSCOPY      endometriosis     TOE FUSION Left      URETEROSCOPY         Social History     Social History     Marital status: Single     Spouse name: N/A     Number of children: N/A     Years of education: N/A     Occupational History      [employer]     Social History Main Topics     Smoking status: Former Smoker     Smokeless tobacco: Former User      Comment: Casual smoker by hx     Alcohol use No     Drug use: No     Sexual activity: Yes     Partners: Female     Other Topics Concern     Not on file     Social History Narrative    Single, homosexual, has partner for 17 years. Quit smoking. Non alcohol drinker. 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       Patient Care Team:  Chandrakant Jack MD as PCP - General (Internal Medicine)          Objective:     There were no vitals filed for this visit.      Physical Exam:  /80  Pulse 95  Ht 5' 5\" (1.651 m)  Wt 187 lb (84.8 kg)  SpO2 98%  BMI 31.12 kg/m2    General Appearance:    Alert, cooperative, no distress, appears stated age, pleasant   Head:    Normocephalic, without obvious abnormality, atraumatic   Eyes:    PERRL, conjunctiva/corneas clear, EOM's intact, fundi     benign, both eyes   Ears:    Normal TM's " and external ear canals, both ears   Nose:   Nares normal, septum midline, mucosa normal, no drainage     or sinus tenderness   Throat:   Lips, mucosa, and tongue normal; teeth and gums normal   Neck:   Supple, symmetrical, trachea midline, no adenopathy;     thyroid:  no enlargement/tenderness/nodules; no carotid    bruit or JVD   Back:     Symmetric, no curvature, ROM normal, no CVA tenderness   Lungs:     Clear to auscultation bilaterally, respirations unlabored   Chest Wall:    No tenderness or deformity    Heart:    Regular rate and rhythm, S1 and S2 normal, no murmur, rub    or gallop   Abdomen:     Soft, non-tender, bowel sounds active all four quadrants,     no masses, no organomegaly   Extremities:   Extremities normal, atraumatic, no cyanosis or edema   Pulses:   2+ and symmetric all extremities   Skin:   Skin color, texture, turgor normal, no rashes or lesions   Lymph nodes:   Cervical, supraclavicular, and axillary nodes normal   Neurologic:    Musculoskeletal:   CNII-XII intact, normal strength, sensation and reflexes     throughout   Tender left hip   ,    There are no Patient Instructions on file for this visit.    EKG: Sinus rhythm with occasional premature ventricular contractions but essentially normal EKG, 6/3/2018.    Labs:  Pending labs are CBC and basic metabolic panel.    Immunization History   Administered Date(s) Administered     DT (pediatric) 05/17/2006     Hep A, historic 02/16/2012, 01/03/2013     Influenza, seasonal,quad inj 6-35 mos 09/18/2009, 11/14/2013     Td,adult,historic,unspecified 05/17/2006     Tdap 02/16/2012           Electronically signed by Chandrakant Jack MD 03/06/18 10:06 AM

## 2021-06-18 NOTE — PROGRESS NOTES
"  Office Visit - Follow Up   Jonn Watson   58 y.o. female    Date of Visit: 5/16/2018    Chief Complaint   Patient presents with     Follow-up     wants to discuss risk for \"breaking bones\". Concerned because previous Vit D level was low.         Assessment and Plan   1. Cold sore  Has recurring cold sore.  Wants to get refills for his Valtrex which he takes 2 tablets by mouth twice a day as needed.  - valACYclovir (VALTREX) 1000 MG tablet; TAKE 2 TABLETS BY MOUTH EVERY 12 HOURS AS NEEDED  Dispense: 24 tablet; Refill: 0    2. Hypothyroidism  Supplemented by levothyroxine.  Last thyroid function was normal.  Recheck TSH and free T4.  - Thyroid Stimulating Hormone (TSH)  - T4, Free    3. Osteopenia  Has osteopenia confirmed by bone DEXA.  Also has low vitamin D.  Being corrected by high-dose vitamin D 50,000 units weekly.  This week is her last dose of her weekly vitamin D supplementation.  Recheck vitamin D.  Also check basic metabolic panel.   - Ambulatory referral to Endocrinology  - Vitamin D, Total (25-Hydroxy)  - Basic Metabolic Panel    4. Stress fracture of left femur  Has stress fracture of the left femur status post screw placement in February 2018.  Followed by orthopedics.  Because of the stress fracture and her osteopenia I think she needs to be treated with biphosphonate.  Discussed several options of treatment for biphosphonate with the patient.  Informed there is weekly alendronate, monthly Boniva and every 6 month Prolia injection.  She will discuss this with her endocrinologist.  she and her endocrinologist, Dr. Graham will decide what  is the best for her..  Has GERD and takes ranitidine as needed.  - Ambulatory referral to Endocrinology    Follow up in 3 months.     History of Present Illness   This 58 y.o. old female here for follow-up.  Was found to have left femur stress fracture status post screw placement in February 2018.  Complained  left hip pains when I saw her in January.  Was referred " "to orthopedics and subsequently workup found she had stress fracture of the left femur by MRI.  Underwent 3 screw placement to the left femur which was successful.  Had subsequent bone density which showed osteopenia.  Because of her stress fracture and osteopenia she needs to be treated  for bone fragility with biphosphonate.  Has hypothyroidism supplemented by levothyroxine.  Has major depression, now  in remission by sertraline.  Has recurring cold sore but resolved by by  valacyclovir.  Overall, doing and feeling well.    Review of Systems   A 12 point comprehensive review of systems was negative except as noted..     Medications, Allergies and Problem List   Reviewed and updated             Chief Complaint   Follow-up (wants to discuss risk for \"breaking bones\". Concerned because previous Vit D level was low. )       Patient Profile   Social History     Social History Narrative    Single, homosexual, has partner for years. Smokes cigarettes, off and on. Non alcohol drinker. 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        Past Medical History   Patient Active Problem List   Diagnosis     Iron Deficiency     Arthropathy Of Multiple Sites     Fatigue     Midback Pain     Hypothyroidism     Arthropathy Of The Ankle / Foot     Esophageal Reflux     Nephrolithiasis     Anxiety     Dizziness     Hiatal Hernia     Nontoxic Solitary Thyroid Nodule       Past Surgical History  She has a past surgical history that includes Appendectomy; Ureteroscopy; Diagnostic laparoscopy; Toe Fusion (Left); Cystoscopy w/ ureteroscopy; and Cystoscopy w/ ureteral stent placement (Left, 11/11/2016).       Medications and Allergies   Current Outpatient Prescriptions   Medication Sig     calcium carbonate-vitamin D3 (CALCIUM 600 WITH VITAMIN D3) 600 mg(1,500mg) -400 unit cap Take by mouth.     ergocalciferol (ERGOCALCIFEROL) 50,000 unit capsule Take 1 capsule (50,000 Units total) by mouth once a week for 12 doses.     hydrOXYzine HCl " "(ATARAX) 10 MG tablet Take 1/2 to 1 tablet at bedtime as needed     levothyroxine (SYNTHROID, LEVOTHROID) 50 MCG tablet Take 1 tablet (50 mcg total) by mouth Daily at 6:00 am.     ranitidine (ZANTAC) 150 MG capsule Take 150 mg by mouth daily as needed for heartburn.     sertraline (ZOLOFT) 100 MG tablet Take 2 tablets (200 mg total) by mouth daily.     valACYclovir (VALTREX) 1000 MG tablet TAKE 2 TABLETS BY MOUTH EVERY 12 HOURS AS NEEDED     Allergies   Allergen Reactions     Erythromycin Base Anaphylaxis and Swelling     Sores in mouth also.     Sulfa (Sulfonamide Antibiotics) Rash        Family and Social History   Family History   Problem Relation Age of Onset     Hypertension Mother      Breast cancer Mother 73     Heart disease Father      Hypertension Father      Aneurysm Sister      Hypertension Brother      Depression Brother      Hypertension Sister      Depression Sister      Hypertension Maternal Grandfather      Gout Paternal Grandmother      Hypertension Paternal Grandmother      Urolithiasis Paternal Grandfather      RECURRENT     Heart disease Paternal Grandfather      Hypertension Paternal Grandfather         Social History   Substance Use Topics     Smoking status: Former Smoker     Smokeless tobacco: Former User      Comment: Casual smoker by hx     Alcohol use No        Physical Exam       Physical Exam  /80  Pulse 96  Ht 5' 5\" (1.651 m)  Wt 186 lb (84.4 kg)  SpO2 98%  BMI 30.95 kg/m2  General appearance: alert, appears stated age, cooperative and no distress  Head: Normocephalic, without obvious abnormality, atraumatic  Throat: lips, mucosa, and tongue normal; teeth and gums normal  Neck: no adenopathy, no carotid bruit, no JVD, supple, symmetrical, trachea midline and thyroid not enlarged, symmetric, no tenderness/mass/nodules  Lungs: clear to auscultation bilaterally  Heart: regular rate and rhythm, S1, S2 normal, no murmur, click, rub or gallop  Abdomen: soft, non-tender; bowel " sounds normal; no masses,  no organomegaly  Extremities: extremities normal, atraumatic, no cyanosis or edema  Skin: Skin color, texture, turgor normal. No rashes or lesions  Neurologic: Grossly normal     Additional Information        Chandrakant Jack MD  Internal Medicine  Contact me at [phone]     Additional Information   Current Outpatient Prescriptions   Medication Sig     calcium carbonate-vitamin D3 (CALCIUM 600 WITH VITAMIN D3) 600 mg(1,500mg) -400 unit cap Take by mouth.     ergocalciferol (ERGOCALCIFEROL) 50,000 unit capsule Take 1 capsule (50,000 Units total) by mouth once a week for 12 doses.     hydrOXYzine HCl (ATARAX) 10 MG tablet Take 1/2 to 1 tablet at bedtime as needed     levothyroxine (SYNTHROID, LEVOTHROID) 50 MCG tablet Take 1 tablet (50 mcg total) by mouth Daily at 6:00 am.     ranitidine (ZANTAC) 150 MG capsule Take 150 mg by mouth daily as needed for heartburn.     sertraline (ZOLOFT) 100 MG tablet Take 2 tablets (200 mg total) by mouth daily.     valACYclovir (VALTREX) 1000 MG tablet TAKE 2 TABLETS BY MOUTH EVERY 12 HOURS AS NEEDED     Allergies   Allergen Reactions     Erythromycin Base Anaphylaxis and Swelling     Sores in mouth also.     Sulfa (Sulfonamide Antibiotics) Rash     Social History   Substance Use Topics     Smoking status: Former Smoker     Smokeless tobacco: Former User      Comment: Casual smoker by hx     Alcohol use No         Time: total time spent with the patient was 25 minutes of which >50% was spent in counseling and coordination of care

## 2021-06-19 NOTE — PROGRESS NOTES
OFFICE VISIT NOTE            Assessment/Plan for  Jonn Watson is a 58 y.o. female.  No Patient Care Coordination Note on file.       1.  Ankle pain and swelling-probable strain.  X-ray ordered reviewed.  No fracture noted  2.  Osteoporosis recently started on Fosamax  3.  Bruising in extremities with normal platelets on Aleve and active.  Probably secondary to trauma antiplatelet effect.  I am allowing her to take Aleve twice a day for 2 weeks with the ankle.  She will cold pack the ankle.  She may bruise more during this time  4.  Healing mid tibia traumatic skin injury  Plan:  Ice, Aleve,  X-ray ordered and reviewed with patient  Skin precautions-longsleeve shirt and longsleeve pants with working to prevent excessive bruising    Diagnoses and all orders for this visit:    Osteoporosis  -     XR Ankle Right 3 or More VWS; Future; Expected date: 8/9/18    Other orders  -     naproxen sodium (ALEVE) 220 mg cap; Take 1 tablet by mouth 2 (two) times a day.; Refill: 0        Twan Suárez MD  Internal medicine  [facility]  [phone]  [email or web address]    This is an electronically verified report by Twan Suárez M.D.  (Note created with Dragon voice recognition and unintended spelling errors and word substitutions may occur)             Subjective:   Chief Complaint:  Ankle Injury (dropped shelf on R shin and since then R ankle is swelling and brusing )    Active individual  She dropped a shelf on her right shin with abrasion.  It is healing nicely.  She also rolled her ankle recently.  She is a .    Significantly she has a history of osteoporosis recently started on Fosamax.  -2.2 bone density  Hip fracture pinning    Does note some easy bruising chronic with normal platelet count in past.  Does take some ibuprofen    Medications:  Current Outpatient Prescriptions on File Prior to Visit   Medication Sig     calcium carbonate-vitamin D3 (CALCIUM 600 WITH VITAMIN  "D3) 600 mg(1,500mg) -400 unit cap Take by mouth.     ergocalciferol (ERGOCALCIFEROL) 50,000 unit capsule TAKE 1 CAPSULE BY MOUTH 1 TIME A WEEK FOR 12 DOSES     hydrOXYzine HCl (ATARAX) 10 MG tablet Take 1/2 to 1 tablet at bedtime as needed     levothyroxine (SYNTHROID, LEVOTHROID) 50 MCG tablet Take 1 tablet (50 mcg total) by mouth Daily at 6:00 am.     ranitidine (ZANTAC) 150 MG capsule Take 150 mg by mouth daily as needed for heartburn.     sertraline (ZOLOFT) 100 MG tablet TAKE 2 TABLETS(200 MG) BY MOUTH DAILY     valACYclovir (VALTREX) 1000 MG tablet TAKE 2 TABLETS BY MOUTH EVERY 12 HOURS AS NEEDED     No current facility-administered medications on file prior to visit.      Allergies:  Allergies   Allergen Reactions     Erythromycin Base Anaphylaxis and Swelling     Sores in mouth also.     Sulfa (Sulfonamide Antibiotics) Rash       Review of Systems:     Extensive 10-point review of systems was performed. Please see the HPI for problem specific pertinent review of systems.     Patient does note she feels well    Has not had any pain    Otherwise, the following systems are noncontributory including constitutional, eyes, ears, nose and throat, cardiovascular, respiratory, gastrointestinal, genitourinary, musculoskeletal,neurological, skin and/or breast, endocrine, hematologic/lymph, allergic/immunologic and psychiatric.      Objective:    /90  Pulse 77  Temp 98.6  F (37  C) (Oral)   Ht 5' 5\" (1.651 m)  Wt 181 lb (82.1 kg)  SpO2 98%  BMI 30.12 kg/m2  Weight:   Wt Readings from Last 3 Encounters:   08/09/18 181 lb (82.1 kg)   05/16/18 186 lb (84.4 kg)   03/06/18 187 lb (84.8 kg)     [unfilled]  181 lb (82.1 kg)    In general, no acute distress.  Right ankle swelling  No ankle effusion  Some bruising right lateral area tenderness along the ankle lateral collateral ligament  No foot swelling        Mid tibia she has a linear excoriation about 2 cm  There is a scab base  No evidence of " cellulitis    Homans sign negative         Review of clinical lab tests  Lab Results   Component Value Date    WBC 12.9 (H) 03/06/2018    HGB 15.3 03/06/2018    HCT 45.7 03/06/2018     03/06/2018    CHOL 191 02/20/2012    TRIG 105 02/20/2012    HDL 47 02/20/2012    ALT 39 05/20/2014    AST 24 05/20/2014     05/16/2018    K 4.2 05/16/2018     05/16/2018    CREATININE 0.74 05/16/2018    BUN 16 05/16/2018    CO2 27 05/16/2018    TSH 0.69 05/16/2018       Glucose   Date/Time Value Ref Range Status   05/16/2018 04:09 PM 94 70 - 125 mg/dL Final   03/06/2018 10:36 AM 92 70 - 125 mg/dL Final   12/15/2016 01:00 AM 94 70 - 125 mg/dL Final   11/10/2015 04:33 PM 96 74 - 125 mg/dL Final   09/16/2015 06:35 PM 94 70 - 125 mg/dL Final   05/20/2014 11:40  70 - 125 mg/dL Final     Comment:          Fasting Glucose reference range is 70-99 mg/dL per       American Diabetes Association (ADA) guidelines.   05/19/2014 04:43 PM 98 70 - 125 mg/dL Final     Comment:     Fasting Glucose reference range is 70-99 mg/dL per       American Diabetes Association (ADA) guidelines.   05/13/2014 02:10 PM 92 70 - 125 mg/dL Final     Comment:          Fasting Glucose reference range is 70-99 mg/dL per       American Diabetes Association (ADA) guidelines.   08/12/2013 12:46 PM 73 70 - 125 mg/dL Final     Comment:     Fasting Glucose reference range is 70-99 mg/dL per       American Diabetes Association (ADA) guidelines.   06/18/2012 12:17 PM 97 70 - 125 mg/dL Final     Comment:     Fasting Glucose reference range is 70-99 mg/dL per       American Diabetes Association (ADA) guidelines.     No results found for this or any previous visit (from the past 24 hour(s)).    Xr Ankle Right 3 Or More Vws    Result Date: 8/9/2018  XR ANKLE RIGHT 3 OR MORE VWS 8/9/2018 10:59 AM INDICATION: Right ankle pain COMPARISON: None. FINDINGS: Lower calf subcutaneous phlebolith and stranding. This stranding could represent contusion, cellulitis, or  edema. No ankle fracture or dislocation. Moderate-sized inferior calcaneal spur.

## 2021-06-21 ENCOUNTER — RECORDS - HEALTHEAST (OUTPATIENT)
Dept: ADMINISTRATIVE | Facility: OTHER | Age: 61
End: 2021-06-21

## 2021-06-21 NOTE — LETTER
Letter by Chandrakant Jack MD at      Author: Chandrakant Jack MD Service: -- Author Type: --    Filed:  Encounter Date: 2/22/2021 Status: (Other)       Jonn GOOD Walter  [address] [place] [address]           February 22, 2021    Dear Jonn    In reviewing your records, we have determined a gap in your preventive services. Based on your age and health history, we recommend the follow service.     ? General Physical  ? Mammogram  ? Blood pressure/cardiovascular check    If you have had the service elsewhere, please contact us so we can update our records. Please let us know if you have transferred your care to another clinic.    Please call [phone] to schedule this appointment.    We believe that a strong preventive care program, including regular physicals and follow-up care is an important part of a healthy lifestyle and we are committed to helping you maintain your health.    Thank you for choosing us as your health care provider.    Sincerely,   [facility]  [address] [place] [address], [address]  [facility] [address]  Phone Number:  [phone]

## 2021-06-22 NOTE — PROGRESS NOTES
"  Office Visit - Follow Up   Jonn Watson   58 y.o. female    Date of Visit: 12/13/2018    Chief Complaint   Patient presents with     Mouth Lesions        Assessment and Plan   1. Parotitis, acute  Discussed with her that this could be a viral versus bacterial infection versus another cause of parotid gland inflammation.  She tends to not drink enough water in her mouth seems to be dry and I encouraged her to drink plenty of water.  We discussed warm packs and parotid gland massage as well as sour candy.  Given her previous history and inflammation on exam we will start Augmentin in the next 2-3 days if she has not improved.  A prescription was provided    Return in about 2 weeks (around 12/27/2018) for follow up with PCP.     History of Present Illness   This 58 y.o. old 8-year-old woman comes in with a day or so of right-sided facial pain, pain opening her mouth and a sore on the inside of her mouth.  She has had no fever or chills.  No other systemic symptoms.  Years ago she had not episode of parotid gland infection that required antibiotics and she is worried about something similar happening now.  She does not tend to drink enough water.  Her dentist recently told her mouth is quite dry.  She does take Vistaril for anxiety and to help her sleep.    Review of Systems: A comprehensive review of systems was negative except as noted.     Medications, Allergies and Problem List   Reviewed, reconciled and updated     Physical Exam   General Appearance:   No acute distress    /84 (Patient Site: Left Arm, Patient Position: Sitting, Cuff Size: Adult Regular)   Pulse 91   Ht 5' 5\" (1.651 m)   Wt 181 lb (82.1 kg)   SpO2 95%   BMI 30.12 kg/m      She is mildly tender over the parotid gland.  She is able to open her mouth but states it hurts.  She has a very small amount of serosanguineous fluid draining from the parotid duct.  No stone palpable.     Additional Information   Current Outpatient Medications "   Medication Sig Dispense Refill     calcium carbonate-vitamin D3 (CALCIUM 600 WITH VITAMIN D3) 600 mg(1,500mg) -400 unit cap Take by mouth.       ergocalciferol (ERGOCALCIFEROL) 50,000 unit capsule TAKE 1 CAPSULE BY MOUTH 1 TIME A WEEK FOR 12 DOSES 12 capsule 0     hydrOXYzine HCl (ATARAX) 10 MG tablet TAKE 1/2 TO 1 TABLET BY MOUTH AT BEDTIME AS NEEDED 30 tablet 6     levothyroxine (SYNTHROID, LEVOTHROID) 50 MCG tablet Take 1 tablet (50 mcg total) by mouth Daily at 6:00 am. 90 tablet 3     ranitidine (ZANTAC) 150 MG capsule Take 150 mg by mouth daily as needed for heartburn.       sertraline (ZOLOFT) 100 MG tablet TAKE 2 TABLETS(200 MG) BY MOUTH DAILY 180 tablet 2     valACYclovir (VALTREX) 1000 MG tablet TAKE 2 TABLETS BY MOUTH EVERY 12 HOURS AS NEEDED 24 tablet 0     amoxicillin-clavulanate (AUGMENTIN) 875-125 mg per tablet Take 1 tablet by mouth 2 (two) times a day for 10 days. 20 tablet 0     No current facility-administered medications for this visit.      Allergies   Allergen Reactions     Erythromycin Base Anaphylaxis and Swelling     Sores in mouth also.     Sulfa (Sulfonamide Antibiotics) Rash     Social History     Tobacco Use     Smoking status: Former Smoker     Smokeless tobacco: Former User     Tobacco comment: Casual smoker by hx   Substance Use Topics     Alcohol use: No     Drug use: No       Review and/or order of clinical lab tests:  Review and/or order of radiology tests:  Review and/or order of medicine tests:  Discussion of test results with performing physician:  Decision to obtain old records and/or obtain history from someone other than the patient:  Review and summarization of old records and/or obtaining history from someone other than the patient and.or discussion of case with another health care provider:  Independent visualization of image, tracing or specimen itself:    Time:      Walter Gilbert MD

## 2021-06-24 NOTE — TELEPHONE ENCOUNTER
Refill Approved    Rx renewed per Medication Renewal Policy. Medication was last renewed on 7/19/18.    Akosua Moore, [facility] Triage/Med Refill 3/10/2019     Requested Prescriptions   Pending Prescriptions Disp Refills     sertraline (ZOLOFT) 100 MG tablet [Pharmacy Med Name: SERTRALINE 100MG TABLETS] 180 tablet 0     Sig: TAKE 2 TABLETS(200 MG) BY MOUTH DAILY    [facility] Refill Protocol  Passed - 3/7/2019  7:42 PM       Passed - PCP or prescribing provider visit in last year    Last office visit with prescriber/PCP: 2/26/2019 Chandrakant Jack MD OR same dept: 2/26/2019 Chandrakant Jack MD OR same specialty: 2/26/2019 Chandrakant Jack MD  Last physical: 3/6/2018 Last MTM visit: Visit date not found   Next visit within 3 mo: Visit date not found  Next physical within 3 mo: Visit date not found  Prescriber OR PCP: Chandrakant Jack MD  Last diagnosis associated with med order: 1. Anxiety  - sertraline (ZOLOFT) 100 MG tablet [Pharmacy Med Name: SERTRALINE 100MG TABLETS]; TAKE 2 TABLETS(200 MG) BY MOUTH DAILY  Dispense: 180 tablet; Refill: 0    If protocol passes may refill for 12 months if within 3 months of last provider visit (or a total of 15 months).

## 2021-06-24 NOTE — TELEPHONE ENCOUNTER
"Call from pt       Calling back as f/u from ED visit last night     Was not given any injectable BP meds  and not sent home on anything and told to follow up with usual provider       At discharge her BP was 174/XX (she thinks 174/85?)     I asked her to check now on the phone with me     Was 201/99mmHg   >No chest pain   >No shortness of breath   >Some \"fatigue\" but has been some stress recently     Has not taken usual BZDs yet   - I related that if stressed, can take them as directed      Wondering if Dr Jack would start her on something without an office visit today?        A/P:    > With current BP this am, he may want to see you in office today before prescribing but will msg his team         confirmed pharmacy - [facility] on [address]     Okay to leave a detailed message on Repair Report.   Tele# [phone]         Maximilian Montenegro, RN   Triage and Medication Refills         Reason for Disposition    Systolic BP >= 180 OR Diastolic >= 110    Protocols used: HIGH BLOOD PRESSURE-A-OH      "

## 2021-06-24 NOTE — PROGRESS NOTES
Office Visit - Follow Up   Jonn Watson   59 y.o. female    Date of Visit: 2/12/2019    Chief Complaint   Patient presents with     Follow-up     refill on Lorazepam      Mass     lump on R side of mouth on upper gum, took antibiotic and did not go away-pain has subsided     Flu Vaccine        Assessment and Plan   1. Prehypertension  Showing increased blood pressure intermittently.  Today's blood pressure is 140-160/ 90.  Reports her blood pressure at home is normal.  So will continue to follow her blood pressure.  Advised to check her blood pressure using her digital blood pressure machine and record from until her next visit.  When she comes to see me in 6 weeks I asked her sweetie bring her digital blood pressure machine to correlate this with our sphygmomanometer.  Since  this is the first time she shows increased blood pressure I will hold giving her blood pressure medication until her next visit.    2. Anxiety  Has recurring anxiety.  Takes lorazepam as needed.  Wants refill for this since her psychiatrist is now retired.  Also takes sertraline for anxiety and depression.  Used to be followed by her psychiatrist.  - LORazepam (ATIVAN) 0.5 MG tablet; Take 1 tablet (0.5 mg total) by mouth 2 (two) times a day as needed for anxiety.  Dispense: 60 tablet; Refill: 0    3. Acquired hypothyroidism  Has hypothyroidism.  Last TSH was normal.  Continue same dose of levothyroxine.  Will check her TSH next visit.    4. Gastroesophageal reflux disease without esophagitis  Complains of intermittent GERD symptoms because of hiatal hernia.  Takes ranitidine 150 mg as needed.    5. Osteopenia of spine  Has osteopenia of the spine but also had a hairline fracture on the left hip in January 2018.  Was seen by orthopedics.  Was treated conservatively by nonbearing on her left hip.  Was advised to see our endocrinologist, Dr. Graham because of her osteopenia and history of stress fracture.  Was recommended to start Fosamax but she  did not start it at all.  Advised to start taking Fosamax.    6. Lesion of oral mucosa  Has a mucosal lesion or small lump on the right upper arm near the molar.  Does not cause any pain.  Was seen by Dr. Kearney.  But at that time she was diagnosed to have acute parotitis.  Was treated conservatively.  But still this lump on the right gum near her molar is still present until now.  There is no more pain. I want her to see her dentist for this.  Informed this is more of a dental problem than a medical problem.    Needs flu shot.  Will give it today.    Follow up in 6 weeks.     History of Present Illness   This 59 y.o. old female is here primarily for follow-up for anxiety.  She was found to have mildly increased blood pressure in the 140-160/90 during this visit.  Denies cardiac complaints.  Has hypothyroidism supplemented by levothyroxine.  Has anxiety and depression but presents  now more with anxiety than depression.  Wants to refill up lorazepam.  Has GERD due to her hiatal hernia.  Takes ranitidine as needed.  Found to have osteopenia and because of her previous history of left hip stress fracture she was advised to start Fosamax by endocrinology.  But she did not start yet.  Still  continues to notice a lump on the upper gum near the left molar.  Was seen by Dr. Lopez for this.  Was assessed she had acute parotitis then.  was treated with Augmentin for 3 days.. Does not have any  associated oral symptoms.  Overall, feels well.    Review of Systems   A 12 point comprehensive review of systems was negative except as noted..     Medications, Allergies and Problem List   Reviewed and updated             Chief Complaint   Follow-up (refill on Lorazepam ); Mass (lump on R side of mouth on upper gum, took antibiotic and did not go away-pain has subsided); and Flu Vaccine       Patient Profile   Social History     Social History Narrative    Single, homosexual, has partner for years. Smokes cigarettes, off and on. Non  alcohol drinker. 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        Past Medical History   Patient Active Problem List   Diagnosis     Iron Deficiency     Arthropathy Of Multiple Sites     Fatigue     Midback Pain     Hypothyroidism     Arthropathy Of The Ankle / Foot     Esophageal Reflux     Nephrolithiasis     Anxiety     Dizziness     Hiatal Hernia     Nontoxic Solitary Thyroid Nodule       Past Surgical History  She has a past surgical history that includes Appendectomy; Ureteroscopy; Diagnostic laparoscopy; Toe Fusion (Left); Cystoscopy w/ ureteroscopy; CYSTOSCOPY, LEFT URETEROSCOPY, STENT INSERTION, STONE EXTRACTION (Left, 11/11/2016); and CYSTOSCOPY LEFT URETEROSCOPY LASER LITHOTRIPSY STENT INSERTION (Left, 9/18/2015).       Medications and Allergies   Current Outpatient Medications   Medication Sig     calcium carbonate-vitamin D3 (CALCIUM 600 WITH VITAMIN D3) 600 mg(1,500mg) -400 unit cap Take by mouth.     hydrOXYzine HCl (ATARAX) 10 MG tablet TAKE 1/2 TO 1 TABLET BY MOUTH AT BEDTIME AS NEEDED     levothyroxine (SYNTHROID, LEVOTHROID) 50 MCG tablet Take 1 tablet (50 mcg total) by mouth Daily at 6:00 am.     ranitidine (ZANTAC) 150 MG capsule Take 150 mg by mouth daily as needed for heartburn.     sertraline (ZOLOFT) 100 MG tablet TAKE 2 TABLETS(200 MG) BY MOUTH DAILY     valACYclovir (VALTREX) 1000 MG tablet TAKE 2 TABLETS BY MOUTH EVERY 12 HOURS AS NEEDED     LORazepam (ATIVAN) 0.5 MG tablet Take 1 tablet (0.5 mg total) by mouth 2 (two) times a day as needed for anxiety.     Allergies   Allergen Reactions     Erythromycin Base Anaphylaxis and Swelling     Sores in mouth also.     Sulfa (Sulfonamide Antibiotics) Rash        Family and Social History   Family History   Problem Relation Age of Onset     Hypertension Mother      Breast cancer Mother 73     Heart disease Father      Hypertension Father      Aneurysm Sister      Hypertension Brother      Depression Brother      Hypertension Sister       "Depression Sister      Hypertension Maternal Grandfather      Gout Paternal Grandmother      Hypertension Paternal Grandmother      Urolithiasis Paternal Grandfather         RECURRENT     Heart disease Paternal Grandfather      Hypertension Paternal Grandfather         Social History     Tobacco Use     Smoking status: Former Smoker     Smokeless tobacco: Former User     Tobacco comment: Casual smoker by hx   Substance Use Topics     Alcohol use: No     Drug use: No        Physical Exam       Physical Exam  /90   Pulse 97   Ht 5' 5\" (1.651 m)   Wt 186 lb (84.4 kg)   SpO2 98%   BMI 30.95 kg/m    General appearance: alert, appears stated age, cooperative and no distress  Head: Normocephalic, without obvious abnormality, atraumatic  Ears: normal TM's and external ear canals both ears  Nose: Nares normal. Septum midline. Mucosa normal. No drainage or sinus tenderness.  Throat: lips, mucosa, and tongue normal; teeth and gums normal  Neck: no adenopathy, no carotid bruit, no JVD, supple, symmetrical, trachea midline and thyroid not enlarged, symmetric, no tenderness/mass/nodules  Lungs: clear to auscultation bilaterally  Heart: regular rate and rhythm, S1, S2 normal, no murmur, click, rub or gallop  Abdomen: soft, non-tender; bowel sounds normal; no masses,  no organomegaly  Extremities: extremities normal, atraumatic, no cyanosis or edema  Skin: Skin color, texture, turgor normal. No rashes or lesions     Additional Information        Chandrakant Jack MD  Internal Medicine  Contact me at [phone]     Additional Information   Current Outpatient Medications   Medication Sig     calcium carbonate-vitamin D3 (CALCIUM 600 WITH VITAMIN D3) 600 mg(1,500mg) -400 unit cap Take by mouth.     hydrOXYzine HCl (ATARAX) 10 MG tablet TAKE 1/2 TO 1 TABLET BY MOUTH AT BEDTIME AS NEEDED     levothyroxine (SYNTHROID, LEVOTHROID) 50 MCG tablet Take 1 tablet (50 mcg total) by mouth Daily at 6:00 am.     ranitidine (ZANTAC) " 150 MG capsule Take 150 mg by mouth daily as needed for heartburn.     sertraline (ZOLOFT) 100 MG tablet TAKE 2 TABLETS(200 MG) BY MOUTH DAILY     valACYclovir (VALTREX) 1000 MG tablet TAKE 2 TABLETS BY MOUTH EVERY 12 HOURS AS NEEDED     LORazepam (ATIVAN) 0.5 MG tablet Take 1 tablet (0.5 mg total) by mouth 2 (two) times a day as needed for anxiety.     Allergies   Allergen Reactions     Erythromycin Base Anaphylaxis and Swelling     Sores in mouth also.     Sulfa (Sulfonamide Antibiotics) Rash     Social History     Tobacco Use     Smoking status: Former Smoker     Smokeless tobacco: Former User     Tobacco comment: Casual smoker by hx   Substance Use Topics     Alcohol use: No     Drug use: No         Time: total time spent with the patient was 25 minutes of which >50% was spent in counseling and coordination of care

## 2021-06-24 NOTE — TELEPHONE ENCOUNTER
Spoke with the patient and relayed message below from Dr. Jack.  She has been scheduled to see him today at 3:20 pm.  Patient had no further questions at this time.  Elizabeth RAJAN CMA/MACK....................8:29 AM

## 2021-06-24 NOTE — TELEPHONE ENCOUNTER
Dr. Jack,  Patient is scheduled to see you this afternoon at 3:20 pm.  Please review message below and advise.  Thank you.  Elizabeth RAJAN, FRANCISCA/MACK....................8:51 AM

## 2021-06-24 NOTE — TELEPHONE ENCOUNTER
Spoke with the patient and she verbalized that she will be at the clinic today at 3:20 pm for her appointment.  Elizabeth RAJAN CMA/MACK....................1:04 PM

## 2021-06-24 NOTE — PROGRESS NOTES
Office Visit - Follow Up   Jonn Watson   59 y.o. female    Date of Visit: 2/26/2019    Chief Complaint   Patient presents with     Hypertension        Assessment and Plan   1. Essential hypertension  She has now newly diagnosed hypertension.  Has been having increased blood pressure since her last visit 2 weeks ago.  2 days ago had increased blood pressure of 181/80.  Yesterday she felt odd and weird.  Checked her blood pressure and it was even higher at 206/99.  Called our clinic and was advised to go to the emergency room because she also had a fall yesterday.  Apparently lost her footing on the stairs weight carrying something but she was able to catch herself before falling down.  Was seen in the emergency room.  Blood pressure was high at 170/91 but went down to 160/90 before her discharge.  Was worked up with negative head CT, normal EKG, normal CBC and BMP.  Today's blood pressure still high at 170/100 on recheck.  Will start her on losartan 50 mg daily.  Will follow up with me in 4 weeks.  - losartan (COZAAR) 50 MG tablet; Take 1 tablet (50 mg total) by mouth daily.  Dispense: 90 tablet; Refill: 3    2. Anxiety  Also has anxiety because of pressure at work, taking care of her mother, and her significant other has a new job.  Has alprazolam at home.  Advised to take alprazolam 2-3 times a day as needed.    Reviewed emergency room MD notes and recommendations.  Reviewed all her labs including her head CT and EKG.  Discussed these with the patient.    Follow up in 4 weeks.     History of Present Illness   This 59 y.o. old female is here for emergency room follow-up.  Was seen in the emergency room yesterday because of increased blood pressure.  Has been having increased blood pressure since her last visit with me 2 weeks ago.  Was advised to observe  or monitor her blood pressure because it was still borderline  on her last visit.  But 2 days ago noted her blood pressure was elevated to 181/80.  Yesterday  she felt weird.  Checked her blood pressure and was it was even more elevated to 206/99.  Called our clinic and was advised to go to the emergency room because she also had a fall.  Apparently she lost her footing going down stairs in her house while carrying something.  She was able to catch herself  preventing her from falling down completely.  Was worked up in the emergency room.  Had normal EKG, negative head CT, normal CBC, normal CMP and thyroid function.  Blood pressure initially was elevated to 170/100 but went down to 160/90 before she was discharged.  Was advised to see me for follow-up.  Still feels anxious today.  But bit better.  Stressed out because of responsibility at work, taking care of her mother and her significant other having a new job.  Denies chest pain shortness of breath.  Denies any other symptoms.    Review of Systems   A 12 point comprehensive review of systems was negative except as noted..     Medications, Allergies and Problem List   Reviewed and updated             Chief Complaint   Hypertension       Patient Profile   Social History     Social History Narrative    Single, homosexual, has partner for years. Smokes cigarettes, off and on. Non alcohol drinker. 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        Past Medical History   Patient Active Problem List   Diagnosis     Iron Deficiency     Arthropathy Of Multiple Sites     Fatigue     Midback Pain     Hypothyroidism     Arthropathy Of The Ankle / Foot     Esophageal Reflux     Nephrolithiasis     Anxiety     Dizziness     Hiatal Hernia     Nontoxic Solitary Thyroid Nodule       Past Surgical History  She has a past surgical history that includes Appendectomy; Ureteroscopy; Diagnostic laparoscopy; Toe Fusion (Left); Cystoscopy w/ ureteroscopy; and Cystoscopy w/ ureteral stent placement (Left, 11/11/2016).       Medications and Allergies   Current Outpatient Medications   Medication Sig     calcium carbonate-vitamin D3  "(CALCIUM 600 WITH VITAMIN D3) 600 mg(1,500mg) -400 unit cap Take by mouth.     hydrOXYzine HCl (ATARAX) 10 MG tablet TAKE 1/2 TO 1 TABLET BY MOUTH AT BEDTIME AS NEEDED     levothyroxine (SYNTHROID, LEVOTHROID) 50 MCG tablet Take 1 tablet (50 mcg total) by mouth Daily at 6:00 am.     LORazepam (ATIVAN) 0.5 MG tablet Take 1 tablet (0.5 mg total) by mouth 2 (two) times a day as needed for anxiety.     ranitidine (ZANTAC) 150 MG capsule Take 150 mg by mouth daily as needed for heartburn.     sertraline (ZOLOFT) 100 MG tablet TAKE 2 TABLETS(200 MG) BY MOUTH DAILY     valACYclovir (VALTREX) 1000 MG tablet TAKE 2 TABLETS BY MOUTH EVERY 12 HOURS AS NEEDED     losartan (COZAAR) 50 MG tablet Take 1 tablet (50 mg total) by mouth daily.     Allergies   Allergen Reactions     Erythromycin Base Anaphylaxis and Swelling     Sores in mouth also.     Sulfa (Sulfonamide Antibiotics) Rash        Family and Social History   Family History   Problem Relation Age of Onset     Hypertension Mother      Breast cancer Mother 73     Heart disease Father      Hypertension Father      Aneurysm Sister      Hypertension Brother      Depression Brother      Hypertension Sister      Depression Sister      Hypertension Maternal Grandfather      Gout Paternal Grandmother      Hypertension Paternal Grandmother      Urolithiasis Paternal Grandfather         RECURRENT     Heart disease Paternal Grandfather      Hypertension Paternal Grandfather         Social History     Tobacco Use     Smoking status: Current Every Day Smoker     Smokeless tobacco: Former User     Tobacco comment: Casual smoker by hx   Substance Use Topics     Alcohol use: No     Drug use: No        Physical Exam       Physical Exam  BP (!) 170/100   Pulse 92   Resp 16   Ht 5' 5\" (1.651 m)   Wt 183 lb (83 kg)   SpO2 98%   BMI 30.45 kg/m    General appearance: alert, appears stated age, cooperative and no distress  Head: Normocephalic, without obvious abnormality, " atraumatic  Throat: lips, mucosa, and tongue normal; teeth and gums normal  Neck: no adenopathy, no carotid bruit, no JVD, supple, symmetrical, trachea midline and thyroid not enlarged, symmetric, no tenderness/mass/nodules  Lungs: clear to auscultation bilaterally  Heart: regular rate and rhythm, S1, S2 normal, no murmur, click, rub or gallop  Abdomen: soft, non-tender; bowel sounds normal; no masses,  no organomegaly  Extremities: extremities normal, atraumatic, no cyanosis or edema  Skin: Skin color, texture, turgor normal. No rashes or lesions  Neurologic: Grossly normal  Psychiatric: Anxious and stressed     Additional Information        Chandrakant Jack MD  Internal Medicine  Contact me at [phone]     Additional Information   Current Outpatient Medications   Medication Sig     calcium carbonate-vitamin D3 (CALCIUM 600 WITH VITAMIN D3) 600 mg(1,500mg) -400 unit cap Take by mouth.     hydrOXYzine HCl (ATARAX) 10 MG tablet TAKE 1/2 TO 1 TABLET BY MOUTH AT BEDTIME AS NEEDED     levothyroxine (SYNTHROID, LEVOTHROID) 50 MCG tablet Take 1 tablet (50 mcg total) by mouth Daily at 6:00 am.     LORazepam (ATIVAN) 0.5 MG tablet Take 1 tablet (0.5 mg total) by mouth 2 (two) times a day as needed for anxiety.     ranitidine (ZANTAC) 150 MG capsule Take 150 mg by mouth daily as needed for heartburn.     sertraline (ZOLOFT) 100 MG tablet TAKE 2 TABLETS(200 MG) BY MOUTH DAILY     valACYclovir (VALTREX) 1000 MG tablet TAKE 2 TABLETS BY MOUTH EVERY 12 HOURS AS NEEDED     losartan (COZAAR) 50 MG tablet Take 1 tablet (50 mg total) by mouth daily.     Allergies   Allergen Reactions     Erythromycin Base Anaphylaxis and Swelling     Sores in mouth also.     Sulfa (Sulfonamide Antibiotics) Rash     Social History     Tobacco Use     Smoking status: Current Every Day Smoker     Smokeless tobacco: Former User     Tobacco comment: Casual smoker by hx   Substance Use Topics     Alcohol use: No     Drug use: No         Time: total  time spent with the patient was 40 minutes of which >50% was spent in counseling and coordination of care

## 2021-06-24 NOTE — TELEPHONE ENCOUNTER
"Pt is calling in about high BP for the last 2 days. Pt reports yesterday it was 181/92, and she just took it now, and it is 204/97. Pt denies chest pain or difficulty breathing. Pt reports that at her last visit, BP medication was discussed, and pt wanted to monitor it for a while first before trying medication. Pt has no numbness or loss of sensation on one side of the body. Pt reports she has been doing strenuous activity such as heavy lifting feeding horses, and has had some sore and tingling arms bilaterally in the back of her arms, \"Like a pulled muscle\". Pt denies headache or other symptoms.   Per protocol, pt needs to be evaluated by a physician within 4 hours. Either WIC or ER was discussed. Pt agrees with plan, and will have someone drive her to the ER. Advised pt to call if she has other questions or concerns, or her symptoms worsen before she is able to be seen.     Antelmo Sampson RN [facility] Triage/Medication Refill  Reason for Disposition    [1] Systolic BP  >= 200 OR Diastolic >= 120  AND [2] having NO cardiac or neurologic symptoms    Protocols used: HIGH BLOOD PRESSURE-A-AH      "

## 2021-06-25 NOTE — TELEPHONE ENCOUNTER
Reason for Call:  Other prescription     Detailed comments:     Pt calling to make sure that     Sig: TAKE 1/2 TO 1 TABLET BY MOUTH AT BEDTIME AS NEEDED   Sent to pharmacy as: hydrOXYzine HCL 10 mg tablet (ATARAX)   E-Prescribing Status: Receipt confirmed by pharmacy (5/26/2020  7:48 AM CDT)     Is changed to 1- 1.5 tablets for next refills, refill request received 6/9/21        Call taken on 6/9/2021 at 10:48 AM by Roxie Mccoy

## 2021-06-25 NOTE — TELEPHONE ENCOUNTER
Refill Approved    Rx renewed per Medication Renewal Policy. Medication was last renewed on 9/24/18.    Carrie Mariano, [facility] Triage/Med Refill 3/20/2019     Requested Prescriptions   Pending Prescriptions Disp Refills     hydrOXYzine HCl (ATARAX) 10 MG tablet [Pharmacy Med Name: HYDROXYZINE HCL 10MG TABLETS] 30 tablet 0     Sig: TAKE 1/2 TO 1 TABLET BY MOUTH AT BEDTIME AS NEEDED    Antihistamine Refill Protocol Passed - 3/18/2019  1:42 PM       Passed - Patient has had office visit/physical in last year    Last office visit with prescriber/PCP: 2/26/2019 Chandrakant Jack MD OR same dept: 2/26/2019 Chandrakant Jack MD OR same specialty: 2/26/2019 Chandrakant Jack MD  Last physical: 3/6/2018 Last MTM visit: Visit date not found   Next visit within 3 mo: Visit date not found  Next physical within 3 mo: Visit date not found  Prescriber OR PCP: Chandrakant Jack MD  Last diagnosis associated with med order: 1. Insomnia  - hydrOXYzine HCl (ATARAX) 10 MG tablet [Pharmacy Med Name: HYDROXYZINE HCL 10MG TABLETS]; TAKE 1/2 TO 1 TABLET BY MOUTH AT BEDTIME AS NEEDED  Dispense: 30 tablet; Refill: 0    If protocol passes may refill for 12 months if within 3 months of last provider visit (or a total of 15 months).

## 2021-06-25 NOTE — TELEPHONE ENCOUNTER
Called to pharmacy. States per insurance cannot fill until 6/11     Called and lvm for patient with this information

## 2021-06-25 NOTE — TELEPHONE ENCOUNTER
Reason for Call: patient calling to report pharmacy can not fill rx, due to conflict of refill date/and dosage.    Detailed comments: please call pharmacy to clarify refill. Let patient know when this is done so she can  RX.    Phone Number Patient can be reached at: Home number on file [phone] (home)    Best Time: any    Can we leave a detailed message on this number?: Yes    Call taken on 6/9/2021 at 1:26 PM by Rajani Villagomez

## 2021-06-25 NOTE — TELEPHONE ENCOUNTER
Reason for Call:  Medication or medication refill:    Do you use a [facility]?  Name of the pharmacy and phone number for the current request: [email or web address], [place], [place]    Name of the medication requested: valACYclovir (VALTREX) 1000 MG tablet    Other request: Pt has 2 cold sores starting and the pharmacy told the pt for urgent request for the patient to call us directly.     Can we leave a detailed message on this number? No call back needed    Phone number patient can be reached at: Home number on file [phone] (home)    Best Time:     Call taken on 6/16/2021 at 1:19 PM by Maddie Huff

## 2021-06-25 NOTE — PROGRESS NOTES
Office Visit - Follow Up   Jonn Watson   59 y.o. female    Date of Visit: 3/21/2019    Chief Complaint   Patient presents with     Follow-up     BP f/u- stopped Losartan last Thurs was having acid reflex really bad, nausea, body aches, and unable to sleep. Wants refill on triamcinalone cream for sore inside of nose        Assessment and Plan   1. Essential hypertension  Stop taking her losartan because it caused her significant nausea,  reflux symptoms and body aches.  Only took it for 2 weeks.  Now her blood pressures increase again.  Today's blood pressure is increased to 170/90.  We will try her on clonidine patch since it is easier to tolerate than oral med.  Will see her in 4 weeks for her increased blood pressure.  - cloNIDine (CATAPRES-TTS) 0.1 mg/24 hr; Place 1 patch on the skin once a week.  Dispense: 4 patch; Refill: 11    2. Lump in mouth  Complains of a lump in the mouth.  Wants to see ENT.  Was seen by Dr. Ludwig in the past and wants to see her again.  - Ambulatory referral to ENT    3. Internal nasal lesion  Has some internal nasal lesions and wants to try again  triamcinolone which worked in the past. Okay to get refill of this  ointment.    4. Acquired hypothyroidism  Well supplemented by levothyroxine.  Last thyroid function in February was normal.      Follow up in 4 weeks.     History of Present Illness   This 59 y.o. old female has hypertension.  Was treated with losartan.  But unable to tolerate losartan due to side effects.  Only took it for 2 weeks and stopped it.  Now she shows  increased blood pressure in the 160-170/.  Still needs a  blood pressure medication to control her blood pressure.  Also complains of nontender lump in the right upper part of the gum in her mouth.  Gets intermittent intranasal lesions which responded to triamcinolone ointment in the past.  Wants to get refill of this.  Has hypothyroidism well supplemented by current dose of levothyroxine.  Overall, feels  well.  No other complaints.    Review of Systems   A 12 point comprehensive review of systems was negative except as noted..     Medications, Allergies and Problem List   Reviewed and updated             Chief Complaint   Follow-up (BP f/u- stopped Losartan last Thurs was having acid reflex really bad, nausea, body aches, and unable to sleep. Wants refill on triamcinalone cream for sore inside of nose)       Patient Profile   Social History     Social History Narrative    Single, homosexual, has partner for years. Smokes cigarettes, off and on. Non alcohol drinker. Works in the [employer] and does free [employer].        Past Medical History   Patient Active Problem List   Diagnosis     Iron Deficiency     Arthropathy Of Multiple Sites     Fatigue     Midback Pain     Hypothyroidism     Arthropathy Of The Ankle / Foot     Esophageal Reflux     Nephrolithiasis     Anxiety     Dizziness     Hiatal Hernia     Nontoxic Solitary Thyroid Nodule       Past Surgical History  She has a past surgical history that includes Appendectomy; Ureteroscopy; Diagnostic laparoscopy; Toe Fusion (Left); Cystoscopy w/ ureteroscopy; and Cystoscopy w/ ureteral stent placement (Left, 11/11/2016).       Medications and Allergies   Current Outpatient Medications   Medication Sig     calcium carbonate-vitamin D3 (CALCIUM 600 WITH VITAMIN D3) 600 mg(1,500mg) -400 unit cap Take by mouth.     hydrOXYzine HCl (ATARAX) 10 MG tablet TAKE 1/2 TO 1 TABLET BY MOUTH AT BEDTIME AS NEEDED     levothyroxine (SYNTHROID, LEVOTHROID) 50 MCG tablet Take 1 tablet (50 mcg total) by mouth Daily at 6:00 am.     LORazepam (ATIVAN) 0.5 MG tablet Take 1 tablet (0.5 mg total) by mouth 2 (two) times a day as needed for anxiety.     ranitidine (ZANTAC) 150 MG capsule Take 150 mg by mouth daily as needed for heartburn.     sertraline (ZOLOFT) 100 MG tablet TAKE 2 TABLETS(200 MG) BY MOUTH DAILY     valACYclovir (VALTREX) 1000 MG tablet TAKE 2 TABLETS BY MOUTH EVERY 12 HOURS  "AS NEEDED     cloNIDine (CATAPRES-TTS) 0.1 mg/24 hr Place 1 patch on the skin once a week.     Allergies   Allergen Reactions     Erythromycin Base Anaphylaxis and Swelling     Sores in mouth also.     Losartan Other (See Comments)     General malaize     Sulfa (Sulfonamide Antibiotics) Rash        Family and Social History   Family History   Problem Relation Age of Onset     Hypertension Mother      Breast cancer Mother 73     Heart disease Father      Hypertension Father      Aneurysm Sister      Hypertension Brother      Depression Brother      Hypertension Sister      Depression Sister      Hypertension Maternal Grandfather      Gout Paternal Grandmother      Hypertension Paternal Grandmother      Urolithiasis Paternal Grandfather         RECURRENT     Heart disease Paternal Grandfather      Hypertension Paternal Grandfather         Social History     Tobacco Use     Smoking status: Current Every Day Smoker     Smokeless tobacco: Former User     Tobacco comment: Casual smoker by hx   Substance Use Topics     Alcohol use: No     Drug use: No        Physical Exam       Physical Exam  /90   Pulse 93   Ht 5' 5\" (1.651 m)   Wt 185 lb (83.9 kg)   SpO2 97%   BMI 30.79 kg/m    General appearance: alert, appears stated age, cooperative and no distress  Head: Normocephalic, without obvious abnormality, atraumatic  Nose: Nares normal. Septum midline. Mucosa normal. No drainage or sinus tenderness.  Throat: lips, mucosa, and tongue normal; teeth and gums normal and right upper gum deep inside the mouth showed nontender palpable lump  Neck: no adenopathy, no carotid bruit, no JVD, supple, symmetrical, trachea midline and thyroid not enlarged, symmetric, no tenderness/mass/nodules  Lungs: clear to auscultation bilaterally  Heart: regular rate and rhythm, S1, S2 normal, no murmur, click, rub or gallop  Abdomen: soft, non-tender; bowel sounds normal; no masses,  no organomegaly  Extremities: extremities normal, " atraumatic, no cyanosis or edema  Skin: Skin color, texture, turgor normal. No rashes or lesions     Additional Information        Chandrakant Jack MD  Internal Medicine  Contact me at [phone]     Additional Information   Current Outpatient Medications   Medication Sig     calcium carbonate-vitamin D3 (CALCIUM 600 WITH VITAMIN D3) 600 mg(1,500mg) -400 unit cap Take by mouth.     hydrOXYzine HCl (ATARAX) 10 MG tablet TAKE 1/2 TO 1 TABLET BY MOUTH AT BEDTIME AS NEEDED     levothyroxine (SYNTHROID, LEVOTHROID) 50 MCG tablet Take 1 tablet (50 mcg total) by mouth Daily at 6:00 am.     LORazepam (ATIVAN) 0.5 MG tablet Take 1 tablet (0.5 mg total) by mouth 2 (two) times a day as needed for anxiety.     ranitidine (ZANTAC) 150 MG capsule Take 150 mg by mouth daily as needed for heartburn.     sertraline (ZOLOFT) 100 MG tablet TAKE 2 TABLETS(200 MG) BY MOUTH DAILY     valACYclovir (VALTREX) 1000 MG tablet TAKE 2 TABLETS BY MOUTH EVERY 12 HOURS AS NEEDED     cloNIDine (CATAPRES-TTS) 0.1 mg/24 hr Place 1 patch on the skin once a week.     Allergies   Allergen Reactions     Erythromycin Base Anaphylaxis and Swelling     Sores in mouth also.     Losartan Other (See Comments)     General malaize     Sulfa (Sulfonamide Antibiotics) Rash     Social History     Tobacco Use     Smoking status: Current Every Day Smoker     Smokeless tobacco: Former User     Tobacco comment: Casual smoker by hx   Substance Use Topics     Alcohol use: No     Drug use: No         Time: total time spent with the patient was 25 minutes of which >50% was spent in counseling and coordination of care

## 2021-06-25 NOTE — TELEPHONE ENCOUNTER
Who is calling:  pt  Reason for Call:  Pt called back to reschedule per request. Scheduled for 3.21.2019.  Date of last appointment with primary care: 2.26.2019  Okay to leave a detailed message: No

## 2021-06-25 NOTE — TELEPHONE ENCOUNTER
Last Visit  4/22/2020 Chandrakant Jack MD  Notes:  1. Hypertension  Continue atenolol 50 mg daily.  Advised if her blood pressure starts to increase in the 160 to 180 systolic she  is advised to increase her atenolol to twice a day.     2. Anxiety  Continue lorazepam as needed.     3. Adjustment insomnia  Continue hydroxyzine at bedtime.  - hydrOXYzine HCL (ATARAX) 10 MG tablet; TAKE 1-1.5 TABLET BY MOUTH AT BEDTIME AS NEEDED  Dispense: 90 tablet; Refill: 3    Last Filled:  hydrOXYzine HCL (ATARAX) 10 MG tablet 30 tablet 4 5/26/2020  No   Sig: TAKE 1/2 TO 1 TABLET BY MOUTH AT BEDTIME AS NEEDED   Sent to pharmacy as: hydrOXYzine HCL 10 mg tablet (ATARAX)   E-Prescribing Status: Receipt confirmed by pharmacy (5/26/2020  7:48 AM CDT)       Next OV:  Visit date not found        Medication teed up for provider signature

## 2021-06-27 ENCOUNTER — HEALTH MAINTENANCE LETTER (OUTPATIENT)
Age: 61
End: 2021-06-27

## 2021-07-06 ENCOUNTER — RECORDS - HEALTHEAST (OUTPATIENT)
Dept: ADMINISTRATIVE | Facility: OTHER | Age: 61
End: 2021-07-06

## 2021-07-13 ENCOUNTER — RECORDS - HEALTHEAST (OUTPATIENT)
Dept: ADMINISTRATIVE | Facility: CLINIC | Age: 61
End: 2021-07-13

## 2021-07-14 ENCOUNTER — TRANSFERRED RECORDS (OUTPATIENT)
Dept: HEALTH INFORMATION MANAGEMENT | Facility: CLINIC | Age: 61
End: 2021-07-14
Payer: COMMERCIAL

## 2021-07-14 PROCEDURE — 88304 TISSUE EXAM BY PATHOLOGIST: CPT | Mod: TC,ORL | Performed by: SURGERY

## 2021-07-15 ENCOUNTER — LAB REQUISITION (OUTPATIENT)
Dept: LAB | Facility: CLINIC | Age: 61
End: 2021-07-15
Payer: COMMERCIAL

## 2021-07-15 DIAGNOSIS — E03.9 HYPOTHYROIDISM: ICD-10-CM

## 2021-07-15 DIAGNOSIS — E03.9 ACQUIRED HYPOTHYROIDISM: Primary | ICD-10-CM

## 2021-07-15 DIAGNOSIS — I10 ESSENTIAL HYPERTENSION: ICD-10-CM

## 2021-07-15 DIAGNOSIS — R22.31 LOCALIZED SWELLING, MASS AND LUMP, RIGHT UPPER LIMB: ICD-10-CM

## 2021-07-16 LAB
PATH REPORT.COMMENTS IMP SPEC: NORMAL
PATH REPORT.COMMENTS IMP SPEC: NORMAL
PATH REPORT.FINAL DX SPEC: NORMAL
PATH REPORT.GROSS SPEC: NORMAL
PATH REPORT.MICROSCOPIC SPEC OTHER STN: NORMAL
PATH REPORT.RELEVANT HX SPEC: NORMAL
PHOTO IMAGE: NORMAL

## 2021-07-16 PROCEDURE — 88304 TISSUE EXAM BY PATHOLOGIST: CPT | Mod: 26 | Performed by: PATHOLOGY

## 2021-07-19 NOTE — TELEPHONE ENCOUNTER
"Routing refill request to provider for review/approval because:  Labs not current:  TSH    Last Written Prescription Date:  4/16/21  Last Fill Quantity: 90,  # refills: 0   Last office visit provider:  9/16/20     Requested Prescriptions   Pending Prescriptions Disp Refills     levothyroxine (SYNTHROID/LEVOTHROID) 50 MCG tablet [Pharmacy Med Name: LEVOTHYROXINE 0.05MG (50MCG) TAB] 90 tablet 0     Sig: TAKE 1 TABLET BY MOUTH EVERY DAY AT 6:00AM       Thyroid Protocol Failed - 7/15/2021  5:46 AM        Failed - Normal TSH on file in past 12 months     Recent Labs   Lab Test 02/25/19  1851   TSH 1.45              Passed - Patient is 12 years or older        Passed - Recent (12 mo) or future (30 days) visit within the authorizing provider's specialty     Patient has had an office visit with the authorizing provider or a provider within the authorizing providers department within the previous 12 mos or has a future within next 30 days. See \"Patient Info\" tab in inbasket, or \"Choose Columns\" in Meds & Orders section of the refill encounter.              Passed - Medication is active on med list        Passed - No active pregnancy on record     If patient is pregnant or has had a positive pregnancy test, please check TSH.          Passed - No positive pregnancy test in past 12 months     If patient is pregnant or has had a positive pregnancy test, please check TSH.             atenolol (TENORMIN) 50 MG tablet [Pharmacy Med Name: ATENOLOL 50MG TABLETS] 90 tablet 0     Sig: TAKE 1 TABLET(50 MG) BY MOUTH DAILY       Beta-Blockers Protocol Failed - 7/15/2021  5:46 AM        Failed - Blood pressure under 140/90 in past 12 months     BP Readings from Last 3 Encounters:   04/22/20 (!) 150/77                 Passed - Patient is age 6 or older        Passed - Recent (12 mo) or future (30 days) visit within the authorizing provider's specialty     Patient has had an office visit with the authorizing provider or a provider within the " "authorizing providers department within the previous 12 mos or has a future within next 30 days. See \"Patient Info\" tab in inbasket, or \"Choose Columns\" in Meds & Orders section of the refill encounter.              Passed - Medication is active on med list             Alan Hinojosa RN 07/19/21 12:43 PM  "

## 2021-07-20 RX ORDER — LEVOTHYROXINE SODIUM 50 UG/1
TABLET ORAL
Qty: 90 TABLET | Refills: 0 | Status: SHIPPED | OUTPATIENT
Start: 2021-07-20 | End: 2021-08-30

## 2021-07-20 RX ORDER — ATENOLOL 50 MG/1
TABLET ORAL
Qty: 90 TABLET | Refills: 0 | Status: SHIPPED | OUTPATIENT
Start: 2021-07-20 | End: 2021-10-14

## 2021-07-21 ENCOUNTER — RECORDS - HEALTHEAST (OUTPATIENT)
Dept: ADMINISTRATIVE | Facility: CLINIC | Age: 61
End: 2021-07-21

## 2021-08-03 ENCOUNTER — TRANSFERRED RECORDS (OUTPATIENT)
Dept: HEALTH INFORMATION MANAGEMENT | Facility: CLINIC | Age: 61
End: 2021-08-03

## 2021-08-24 DIAGNOSIS — E03.9 ACQUIRED HYPOTHYROIDISM: ICD-10-CM

## 2021-08-27 NOTE — TELEPHONE ENCOUNTER
"Routing refill request to provider for review/approval because:  Labs not current:  TSH    Last Written Prescription Date:  7/20/21  Last Fill Quantity: 90,  # refills: 0   Last office visit provider:  9/16/20     Requested Prescriptions   Pending Prescriptions Disp Refills     levothyroxine (SYNTHROID/LEVOTHROID) 50 MCG tablet [Pharmacy Med Name: LEVOTHYROXINE 0.05MG (50MCG) TAB] 90 tablet 0     Sig: TAKE 1 TABLET BY MOUTH EVERY DAY AT 6:00AM       Thyroid Protocol Failed - 8/24/2021 11:34 PM        Failed - Normal TSH on file in past 12 months     Recent Labs   Lab Test 02/25/19  1851   TSH 1.45              Passed - Patient is 12 years or older        Passed - Recent (12 mo) or future (30 days) visit within the authorizing provider's specialty     Patient has had an office visit with the authorizing provider or a provider within the authorizing providers department within the previous 12 mos or has a future within next 30 days. See \"Patient Info\" tab in inbasket, or \"Choose Columns\" in Meds & Orders section of the refill encounter.              Passed - Medication is active on med list        Passed - No active pregnancy on record     If patient is pregnant or has had a positive pregnancy test, please check TSH.          Passed - No positive pregnancy test in past 12 months     If patient is pregnant or has had a positive pregnancy test, please check TSH.               erick shay RN 08/27/21 3:15 PM  "

## 2021-08-30 ENCOUNTER — MYC REFILL (OUTPATIENT)
Dept: INTERNAL MEDICINE | Facility: CLINIC | Age: 61
End: 2021-08-30

## 2021-08-30 DIAGNOSIS — E03.9 ACQUIRED HYPOTHYROIDISM: ICD-10-CM

## 2021-08-30 RX ORDER — LEVOTHYROXINE SODIUM 50 UG/1
TABLET ORAL
Qty: 90 TABLET | Refills: 0 | Status: SHIPPED | OUTPATIENT
Start: 2021-08-30 | End: 2021-12-14

## 2021-08-30 RX ORDER — LEVOTHYROXINE SODIUM 50 UG/1
TABLET ORAL
Qty: 90 TABLET | Refills: 0 | OUTPATIENT
Start: 2021-08-30

## 2021-08-30 NOTE — TELEPHONE ENCOUNTER
Pending Prescriptions:                       Disp   Refills    levothyroxine (SYNTHROID/LEVOTHROID) 50 M*90 tab*0            Sig: Take 1 tablet (50 mcg) by mouth daily

## 2021-08-31 RX ORDER — LEVOTHYROXINE SODIUM 50 UG/1
50 TABLET ORAL DAILY
Qty: 90 TABLET | Refills: 0 | OUTPATIENT
Start: 2021-08-31

## 2021-09-21 DIAGNOSIS — Z11.59 ENCOUNTER FOR SCREENING FOR OTHER VIRAL DISEASES: ICD-10-CM

## 2021-10-01 ENCOUNTER — LAB (OUTPATIENT)
Dept: LAB | Facility: CLINIC | Age: 61
End: 2021-10-01
Attending: OBSTETRICS & GYNECOLOGY
Payer: COMMERCIAL

## 2021-10-01 DIAGNOSIS — Z11.59 ENCOUNTER FOR SCREENING FOR OTHER VIRAL DISEASES: ICD-10-CM

## 2021-10-01 PROCEDURE — U0005 INFEC AGEN DETEC AMPLI PROBE: HCPCS

## 2021-10-01 PROCEDURE — U0003 INFECTIOUS AGENT DETECTION BY NUCLEIC ACID (DNA OR RNA); SEVERE ACUTE RESPIRATORY SYNDROME CORONAVIRUS 2 (SARS-COV-2) (CORONAVIRUS DISEASE [COVID-19]), AMPLIFIED PROBE TECHNIQUE, MAKING USE OF HIGH THROUGHPUT TECHNOLOGIES AS DESCRIBED BY CMS-2020-01-R: HCPCS

## 2021-10-02 LAB — SARS-COV-2 RNA RESP QL NAA+PROBE: NEGATIVE

## 2021-10-04 DIAGNOSIS — F41.1 ANXIETY STATE: ICD-10-CM

## 2021-10-04 RX ORDER — OMEPRAZOLE 20 MG/1
20 TABLET, DELAYED RELEASE ORAL DAILY
COMMUNITY

## 2021-10-04 ASSESSMENT — MIFFLIN-ST. JEOR: SCORE: 1360.6

## 2021-10-05 RX ORDER — LORAZEPAM 0.5 MG/1
TABLET ORAL
Qty: 30 TABLET | Refills: 0 | Status: SHIPPED | OUTPATIENT
Start: 2021-10-05 | End: 2022-02-25

## 2021-10-05 NOTE — TELEPHONE ENCOUNTER
Routing refill request to provider for review/approval because:  This is a controlled medication    Last Written Prescription Date:  10/23/2020  Last Fill Quantity: 90,  # refills: 0   Last office visit provider:  9/16/2020     Requested Prescriptions   Pending Prescriptions Disp Refills     LORazepam (ATIVAN) 0.5 MG tablet [Pharmacy Med Name: LORAZEPAM 0.5MG TABLETS] 90 tablet      Sig: TAKE 1 TABLET(0.5 MG) BY MOUTH THREE TIMES DAILY AS NEEDED FOR ANXIETY       There is no refill protocol information for this order          Radha Fontanez RN 10/05/21 11:49 AM

## 2021-10-06 ENCOUNTER — ANESTHESIA EVENT (OUTPATIENT)
Dept: SURGERY | Facility: AMBULATORY SURGERY CENTER | Age: 61
End: 2021-10-06
Payer: COMMERCIAL

## 2021-10-06 ENCOUNTER — OFFICE VISIT (OUTPATIENT)
Dept: FAMILY MEDICINE | Facility: CLINIC | Age: 61
End: 2021-10-06
Payer: COMMERCIAL

## 2021-10-06 VITALS
BODY MASS INDEX: 27.94 KG/M2 | HEART RATE: 71 BPM | DIASTOLIC BLOOD PRESSURE: 80 MMHG | SYSTOLIC BLOOD PRESSURE: 140 MMHG | WEIGHT: 178 LBS | HEIGHT: 67 IN | OXYGEN SATURATION: 95 %

## 2021-10-06 DIAGNOSIS — Z01.818 PREOPERATIVE EXAMINATION: Primary | ICD-10-CM

## 2021-10-06 DIAGNOSIS — N95.0 PMB (POSTMENOPAUSAL BLEEDING): ICD-10-CM

## 2021-10-06 DIAGNOSIS — I10 ESSENTIAL HYPERTENSION: ICD-10-CM

## 2021-10-06 DIAGNOSIS — E03.9 HYPOTHYROIDISM, UNSPECIFIED TYPE: ICD-10-CM

## 2021-10-06 DIAGNOSIS — D50.9 IRON DEFICIENCY ANEMIA, UNSPECIFIED IRON DEFICIENCY ANEMIA TYPE: ICD-10-CM

## 2021-10-06 LAB — TSH SERPL DL<=0.005 MIU/L-ACNC: 1.2 UIU/ML (ref 0.3–5)

## 2021-10-06 PROCEDURE — 99213 OFFICE O/P EST LOW 20 MIN: CPT | Performed by: NURSE PRACTITIONER

## 2021-10-06 PROCEDURE — 84443 ASSAY THYROID STIM HORMONE: CPT | Performed by: NURSE PRACTITIONER

## 2021-10-06 PROCEDURE — 36415 COLL VENOUS BLD VENIPUNCTURE: CPT | Performed by: NURSE PRACTITIONER

## 2021-10-06 ASSESSMENT — MIFFLIN-ST. JEOR: SCORE: 1415.03

## 2021-10-06 NOTE — H&P (VIEW-ONLY)
[facility]  [address] [address]  Phone: [phone]  Fax: [phone]  Primary Provider: Chandrakant Jack  Pre-op Performing Provider: RIMMA RAINES      PREOPERATIVE EVALUATION:  Today's date: 10/6/2021    Jonn Watson is a 61 year old female who presents for a preoperative evaluation.    Surgical Information:  Surgery/Procedure: Hysteroscopy with Dilation and Curettage  Surgery Location: [facility]  Surgeon: Norma Romero DO  Surgery Date: 10/07/2021  Time of Surgery: 8:45 AM  Where patient plans to recover: At home with family  Fax number for surgical facility: Note does not need to be faxed, will be available electronically in Epic.    Type of Anesthesia Anticipated: to be determined    Assessment & Plan     The proposed surgical procedure is considered LOW risk.    Preoperative examination  No contraindications for planned procedure    PMB (postmenopausal bleeding)  Planning to undergo hysteroscopy for postmenopausal bleeding    Hypothyroidism, unspecified type  Historically on a stable dose of Synthroid although she has not had her TSH checked in about 2 years.  We will do that toda  - TSH with free T4 reflex; Future    Hypertension  Well-controlled with atenolol    Iron deficiency anemia, unspecified iron deficiency anemia type  CBC checked last year and was normal    Patient Instructions   Hold all supplements, aspirin and NSAIDs for 7 days prior to surgery.     Follow your surgeon's direction on when to stop eating and drinking prior to surgery.    Your surgeon will be managing your pain after your surgery.      Remove all jewelry and metal piercings before your surgery.     Remove nail polish from fingers before surgery.    If you use a CPAP machine, bring this with you to surgery.           Risks and Recommendations:  The patient has the following additional risks and recommendations for perioperative  complications:   - No identified additional risk factors other than previously addressed    Medication Instructions:  Patient is to take all scheduled medications on the day of surgery    RECOMMENDATION:  APPROVAL GIVEN to proceed with proposed procedure, without further diagnostic evaluation.    21 minutes spent on the date of the encounter doing chart review, history and exam, documentation and further activities per the note      Subjective     HPI related to upcoming procedure: Planning to undergo hysteroscopy for postmenopausal bleeding    Preop Questions 10/3/2021   1. Have you ever had a heart attack or stroke? No   2. Have you ever had surgery on your heart or blood vessels, such as a stent placement, a coronary artery bypass, or surgery on an artery in your head, neck, heart, or legs? No   3. Do you have chest pain with activity? No   4. Do you have a history of  heart failure? No   5. Do you currently have a cold, bronchitis or symptoms of other infection? No   6. Do you have a cough, shortness of breath, or wheezing? No   7. Do you or anyone in your family have previous history of blood clots? No   8. Do you or does anyone in your family have a serious bleeding problem such as prolonged bleeding following surgeries or cuts? No   9. Have you ever had problems with anemia or been told to take iron pills? YES -    10. Have you had any abnormal blood loss such as black, tarry or bloody stools, or abnormal vaginal bleeding? YES -    11. Have you ever had a blood transfusion? No   12. Are you willing to have a blood transfusion if it is medically needed before, during, or after your surgery? Yes   13. Have you or any of your relatives ever had problems with anesthesia? No   14. Do you have sleep apnea, excessive snoring or daytime drowsiness? No   15. Do you have any artifical heart valves or other implanted medical devices like a pacemaker, defibrillator, or continuous glucose monitor? No   16. Do you have  artificial joints? No   17. Are you allergic to latex? No   18. Is there any chance that you may be pregnant? No       Health Care Directive:  Patient does not have a Health Care Directive or Living Will: Discussed advance care planning with patient; however, patient declined at this time.    Preoperative Review of :   reviewed - controlled substances reflected in medication list.      Status of Chronic Conditions:  See problem list for active medical problems.  Problems all longstanding and stable, except as noted/documented.  See ROS for pertinent symptoms related to these conditions.      Review of Systems  CONSTITUTIONAL: NEGATIVE for fever, chills, change in weight  ENT/MOUTH: NEGATIVE for ear, mouth and throat problems  RESP: NEGATIVE for significant cough or SOB  CV: NEGATIVE for chest pain, palpitations or peripheral edema    Patient Active Problem List    Diagnosis Date Noted     Hypertension      Priority: Medium     Created by Conversion  Replacement Utility updated for latest IMO load         Arthropathy Of Multiple Sites      Priority: Medium     Created by Conversion  Replacement Utility updated for latest IMO load         Hypothyroidism      Priority: Medium     Created by Conversion  Replacement Utility updated for latest IMO load         Arthropathy Of The Ankle / Foot      Priority: Medium     Created by Conversion  Replacement Utility updated for latest IMO load         Anxiety      Priority: Medium     Created by Conversion  Replacement Utility updated for latest IMO load         Hiatal Hernia      Priority: Medium     Created by Conversion  Replacement Utility updated for latest IMO load         Esophageal Reflux      Priority: Medium     Created by Conversion         Dizziness      Priority: Medium     Created by Conversion         Nontoxic Solitary Thyroid Nodule      Priority: Medium     Created by Conversion         Iron Deficiency      Priority: Medium     Created by Conversion          Fatigue      Priority: Medium     Created by Conversion         Midback Pain      Priority: Medium     Created by Conversion         Nephrolithiasis      Priority: Medium     Created by Conversion          Past Medical History:   Diagnosis Date     Anemia      Anxiety      Bladder infection      Bronchitis      Depression      Disease of thyroid gland      Endometriosis      GERD (gastroesophageal reflux disease)      Hiatal hernia      Hip pain     Right>Left     HL (hearing loss)      Hypertension      Kidney stone      Motion sickness      Past Surgical History:   Procedure Laterality Date     APPENDECTOMY       ARTHRODESIS TOE(S) Left      COMBINED CYSTOSCOPY, INSERT STENT URETER(S) Left 11/11/2016    Procedure: CYSTOSCOPY, LEFT URETEROSCOPY, STENT INSERTION, STONE EXTRACTION;  Surgeon: Patric Isabel MD;  Location: [facility];  Service:      CYSTOSCOPY W/ URETEROSCOPY       LAPAROSCOPY DIAGNOSTIC (GENERAL)      endometriosis     URETEROSCOPY       Current Outpatient Medications   Medication Sig Dispense Refill     atenolol (TENORMIN) 50 MG tablet TAKE 1 TABLET(50 MG) BY MOUTH DAILY 90 tablet 0     calcium carbonate-vitamin D3 (CALCIUM 600 WITH VITAMIN D3) 600 mg(1,500mg) -400 unit cap [CALCIUM CARBONATE-VITAMIN D3 (CALCIUM 600 WITH VITAMIN D3) 600 MG(1,500MG) -400 UNIT CAP] Take by mouth.       hydrOXYzine HCL (ATARAX) 10 MG tablet [HYDROXYZINE HCL (ATARAX) 10 MG TABLET] TAKE 1 TO 1.5 TABLET BY MOUTH AT BEDTIME AS NEEDED 30 tablet 4     levothyroxine (SYNTHROID/LEVOTHROID) 50 MCG tablet TAKE 1 TABLET BY MOUTH EVERY DAY AT 6:00AM 90 tablet 0     LORazepam (ATIVAN) 0.5 MG tablet TAKE 1 TABLET(0.5 MG) BY MOUTH THREE TIMES DAILY AS NEEDED FOR ANXIETY 30 tablet 0     omeprazole (PRILOSEC OTC) 20 MG EC tablet Take 20 mg by mouth daily       sertraline (ZOLOFT) 100 MG tablet [SERTRALINE (ZOLOFT) 100 MG TABLET] TAKE 2 TABLETS(200 MG) BY MOUTH DAILY 180 tablet 2     triamcinolone (KENALOG) 0.5 % ointment  "[TRIAMCINOLONE (KENALOG) 0.5 % OINTMENT] Apply topically 2 (two) times a day. 30 g 1     valACYclovir (VALTREX) 1000 MG tablet [VALACYCLOVIR (VALTREX) 1000 MG TABLET] Take 1 tablet (1,000 mg total) by mouth every 12 (twelve) hours as needed. 24 tablet 0       Allergies   Allergen Reactions     Erythromycin Base [Erythromycin] Anaphylaxis and Swelling     Sores in mouth also.     Losartan Other (See Comments)     General malaize     Sulfa (Sulfonamide Antibiotics) [Sulfa Drugs] Rash        Social History     Tobacco Use     Smoking status: Current Every Day Smoker     Smokeless tobacco: Former User     Tobacco comment: Casual smoker by hx   Substance Use Topics     Alcohol use: No     Family History   Problem Relation Age of Onset     Hypertension Mother      Breast Cancer Mother 73.00     Heart Disease Father      Hypertension Father      Aneurysm Sister      Hypertension Brother      Depression Brother      Hypertension Sister      Depression Sister      Hypertension Maternal Grandfather      Gout Paternal Grandmother      Hypertension Paternal Grandmother      Urolithiasis Paternal Grandfather         RECURRENT     Heart Disease Paternal Grandfather      Hypertension Paternal Grandfather      History   Drug Use No         Objective     BP (!) 140/80 (BP Location: Right arm, Patient Position: Sitting, Cuff Size: Adult Regular)   Pulse 71   Ht 1.702 m (5' 7\")   Wt 80.7 kg (178 lb)   SpO2 95%   BMI 27.88 kg/m      Physical Exam  GENERAL APPEARANCE: healthy, alert and no distress  HENT: ear canals and TM's normal and nose and mouth without ulcers or lesions  RESP: lungs clear to auscultation - no rales, rhonchi or wheezes  CV: regular rate and rhythm, normal S1 S2, no S3 or S4 and no murmur, click or rub   ABDOMEN: soft, nontender, no HSM or masses and bowel sounds normal  NEURO: Normal strength and tone, sensory exam grossly normal, mentation intact and speech normal    No results for input(s): HGB, PLT, INR, " NA, POTASSIUM, CR, A1C in the last [id number] hours.     Diagnostics:  Labs pending at this time.  Results will be reviewed when available.   No EKG required, no history of coronary heart disease, significant arrhythmia, peripheral arterial disease or other structural heart disease.    Revised Cardiac Risk Index (RCRI):  The patient has the following serious cardiovascular risks for perioperative complications:   - No serious cardiac risks = 0 points     RCRI Interpretation: 0 points: Class I (very low risk - 0.4% complication rate)           Signed Electronically by: Oscar Fair CNP  Copy of this evaluation report is provided to requesting physician.

## 2021-10-06 NOTE — PROGRESS NOTES
[facility]  [address] [address]  Phone: [phone]  Fax: [phone]  Primary Provider: Chandrakant Jack  Pre-op Performing Provider: RIMMA RAINES      PREOPERATIVE EVALUATION:  Today's date: 10/6/2021    Jonn Watson is a 61 year old female who presents for a preoperative evaluation.    Surgical Information:  Surgery/Procedure: Hysteroscopy with Dilation and Curettage  Surgery Location: [facility] [place]  Surgeon: Norma Romero DO  Surgery Date: 10/07/2021  Time of Surgery: 8:45 AM  Where patient plans to recover: At home with family  Fax number for surgical facility: Note does not need to be faxed, will be available electronically in Epic.    Type of Anesthesia Anticipated: to be determined    Assessment & Plan     The proposed surgical procedure is considered LOW risk.    Preoperative examination  No contraindications for planned procedure    PMB (postmenopausal bleeding)  Planning to undergo hysteroscopy for postmenopausal bleeding    Hypothyroidism, unspecified type  Historically on a stable dose of Synthroid although she has not had her TSH checked in about 2 years.  We will do that toda  - TSH with free T4 reflex; Future    Hypertension  Well-controlled with atenolol    Iron deficiency anemia, unspecified iron deficiency anemia type  CBC checked last year and was normal    Patient Instructions   Hold all supplements, aspirin and NSAIDs for 7 days prior to surgery.     Follow your surgeon's direction on when to stop eating and drinking prior to surgery.    Your surgeon will be managing your pain after your surgery.      Remove all jewelry and metal piercings before your surgery.     Remove nail polish from fingers before surgery.    If you use a CPAP machine, bring this with you to surgery.           Risks and Recommendations:  The patient has the following additional risks and recommendations for perioperative  complications:   - No identified additional risk factors other than previously addressed    Medication Instructions:  Patient is to take all scheduled medications on the day of surgery    RECOMMENDATION:  APPROVAL GIVEN to proceed with proposed procedure, without further diagnostic evaluation.    21 minutes spent on the date of the encounter doing chart review, history and exam, documentation and further activities per the note      Subjective     HPI related to upcoming procedure: Planning to undergo hysteroscopy for postmenopausal bleeding    Preop Questions 10/3/2021   1. Have you ever had a heart attack or stroke? No   2. Have you ever had surgery on your heart or blood vessels, such as a stent placement, a coronary artery bypass, or surgery on an artery in your head, neck, heart, or legs? No   3. Do you have chest pain with activity? No   4. Do you have a history of  heart failure? No   5. Do you currently have a cold, bronchitis or symptoms of other infection? No   6. Do you have a cough, shortness of breath, or wheezing? No   7. Do you or anyone in your family have previous history of blood clots? No   8. Do you or does anyone in your family have a serious bleeding problem such as prolonged bleeding following surgeries or cuts? No   9. Have you ever had problems with anemia or been told to take iron pills? YES -    10. Have you had any abnormal blood loss such as black, tarry or bloody stools, or abnormal vaginal bleeding? YES -    11. Have you ever had a blood transfusion? No   12. Are you willing to have a blood transfusion if it is medically needed before, during, or after your surgery? Yes   13. Have you or any of your relatives ever had problems with anesthesia? No   14. Do you have sleep apnea, excessive snoring or daytime drowsiness? No   15. Do you have any artifical heart valves or other implanted medical devices like a pacemaker, defibrillator, or continuous glucose monitor? No   16. Do you have  artificial joints? No   17. Are you allergic to latex? No   18. Is there any chance that you may be pregnant? No       Health Care Directive:  Patient does not have a Health Care Directive or Living Will: Discussed advance care planning with patient; however, patient declined at this time.    Preoperative Review of :   reviewed - controlled substances reflected in medication list.      Status of Chronic Conditions:  See problem list for active medical problems.  Problems all longstanding and stable, except as noted/documented.  See ROS for pertinent symptoms related to these conditions.      Review of Systems  CONSTITUTIONAL: NEGATIVE for fever, chills, change in weight  ENT/MOUTH: NEGATIVE for ear, mouth and throat problems  RESP: NEGATIVE for significant cough or SOB  CV: NEGATIVE for chest pain, palpitations or peripheral edema    Patient Active Problem List    Diagnosis Date Noted     Hypertension      Priority: Medium     Created by Conversion  Replacement Utility updated for latest IMO load         Arthropathy Of Multiple Sites      Priority: Medium     Created by Conversion  Replacement Utility updated for latest IMO load         Hypothyroidism      Priority: Medium     Created by Conversion  Replacement Utility updated for latest IMO load         Arthropathy Of The Ankle / Foot      Priority: Medium     Created by Conversion  Replacement Utility updated for latest IMO load         Anxiety      Priority: Medium     Created by Conversion  Replacement Utility updated for latest IMO load         Hiatal Hernia      Priority: Medium     Created by Conversion  Replacement Utility updated for latest IMO load         Esophageal Reflux      Priority: Medium     Created by Conversion         Dizziness      Priority: Medium     Created by Conversion         Nontoxic Solitary Thyroid Nodule      Priority: Medium     Created by Conversion         Iron Deficiency      Priority: Medium     Created by Conversion          Fatigue      Priority: Medium     Created by Conversion         Midback Pain      Priority: Medium     Created by Conversion         Nephrolithiasis      Priority: Medium     Created by Conversion          Past Medical History:   Diagnosis Date     Anemia      Anxiety      Bladder infection      Bronchitis      Depression      Disease of thyroid gland      Endometriosis      GERD (gastroesophageal reflux disease)      Hiatal hernia      Hip pain     Right>Left     HL (hearing loss)      Hypertension      Kidney stone      Motion sickness      Past Surgical History:   Procedure Laterality Date     APPENDECTOMY       ARTHRODESIS TOE(S) Left      COMBINED CYSTOSCOPY, INSERT STENT URETER(S) Left 11/11/2016    Procedure: CYSTOSCOPY, LEFT URETEROSCOPY, STENT INSERTION, STONE EXTRACTION;  Surgeon: Patric Isabel MD;  Location: [facility];  Service:      CYSTOSCOPY W/ URETEROSCOPY       LAPAROSCOPY DIAGNOSTIC (GENERAL)      endometriosis     URETEROSCOPY       Current Outpatient Medications   Medication Sig Dispense Refill     atenolol (TENORMIN) 50 MG tablet TAKE 1 TABLET(50 MG) BY MOUTH DAILY 90 tablet 0     calcium carbonate-vitamin D3 (CALCIUM 600 WITH VITAMIN D3) 600 mg(1,500mg) -400 unit cap [CALCIUM CARBONATE-VITAMIN D3 (CALCIUM 600 WITH VITAMIN D3) 600 MG(1,500MG) -400 UNIT CAP] Take by mouth.       hydrOXYzine HCL (ATARAX) 10 MG tablet [HYDROXYZINE HCL (ATARAX) 10 MG TABLET] TAKE 1 TO 1.5 TABLET BY MOUTH AT BEDTIME AS NEEDED 30 tablet 4     levothyroxine (SYNTHROID/LEVOTHROID) 50 MCG tablet TAKE 1 TABLET BY MOUTH EVERY DAY AT 6:00AM 90 tablet 0     LORazepam (ATIVAN) 0.5 MG tablet TAKE 1 TABLET(0.5 MG) BY MOUTH THREE TIMES DAILY AS NEEDED FOR ANXIETY 30 tablet 0     omeprazole (PRILOSEC OTC) 20 MG EC tablet Take 20 mg by mouth daily       sertraline (ZOLOFT) 100 MG tablet [SERTRALINE (ZOLOFT) 100 MG TABLET] TAKE 2 TABLETS(200 MG) BY MOUTH DAILY 180 tablet 2     triamcinolone (KENALOG) 0.5 % ointment  "[TRIAMCINOLONE (KENALOG) 0.5 % OINTMENT] Apply topically 2 (two) times a day. 30 g 1     valACYclovir (VALTREX) 1000 MG tablet [VALACYCLOVIR (VALTREX) 1000 MG TABLET] Take 1 tablet (1,000 mg total) by mouth every 12 (twelve) hours as needed. 24 tablet 0       Allergies   Allergen Reactions     Erythromycin Base [Erythromycin] Anaphylaxis and Swelling     Sores in mouth also.     Losartan Other (See Comments)     General malaize     Sulfa (Sulfonamide Antibiotics) [Sulfa Drugs] Rash        Social History     Tobacco Use     Smoking status: Current Every Day Smoker     Smokeless tobacco: Former User     Tobacco comment: Casual smoker by hx   Substance Use Topics     Alcohol use: No     Family History   Problem Relation Age of Onset     Hypertension Mother      Breast Cancer Mother 73.00     Heart Disease Father      Hypertension Father      Aneurysm Sister      Hypertension Brother      Depression Brother      Hypertension Sister      Depression Sister      Hypertension Maternal Grandfather      Gout Paternal Grandmother      Hypertension Paternal Grandmother      Urolithiasis Paternal Grandfather         RECURRENT     Heart Disease Paternal Grandfather      Hypertension Paternal Grandfather      History   Drug Use No         Objective     BP (!) 140/80 (BP Location: Right arm, Patient Position: Sitting, Cuff Size: Adult Regular)   Pulse 71   Ht 1.702 m (5' 7\")   Wt 80.7 kg (178 lb)   SpO2 95%   BMI 27.88 kg/m      Physical Exam  GENERAL APPEARANCE: healthy, alert and no distress  HENT: ear canals and TM's normal and nose and mouth without ulcers or lesions  RESP: lungs clear to auscultation - no rales, rhonchi or wheezes  CV: regular rate and rhythm, normal S1 S2, no S3 or S4 and no murmur, click or rub   ABDOMEN: soft, nontender, no HSM or masses and bowel sounds normal  NEURO: Normal strength and tone, sensory exam grossly normal, mentation intact and speech normal    No results for input(s): HGB, PLT, INR, " NA, POTASSIUM, CR, A1C in the last 28625 hours.     Diagnostics:  Labs pending at this time.  Results will be reviewed when available.   No EKG required, no history of coronary heart disease, significant arrhythmia, peripheral arterial disease or other structural heart disease.    Revised Cardiac Risk Index (RCRI):  The patient has the following serious cardiovascular risks for perioperative complications:   - No serious cardiac risks = 0 points     RCRI Interpretation: 0 points: Class I (very low risk - 0.4% complication rate)           Signed Electronically by: Oscar Fair CNP  Copy of this evaluation report is provided to requesting physician.

## 2021-10-07 ENCOUNTER — ANESTHESIA (OUTPATIENT)
Dept: SURGERY | Facility: AMBULATORY SURGERY CENTER | Age: 61
End: 2021-10-07
Payer: COMMERCIAL

## 2021-10-07 ENCOUNTER — HOSPITAL ENCOUNTER (OUTPATIENT)
Facility: AMBULATORY SURGERY CENTER | Age: 61
End: 2021-10-07
Attending: OBSTETRICS & GYNECOLOGY
Payer: COMMERCIAL

## 2021-10-07 VITALS
HEIGHT: 66 IN | WEIGHT: 178 LBS | OXYGEN SATURATION: 100 % | TEMPERATURE: 96.9 F | HEART RATE: 61 BPM | DIASTOLIC BLOOD PRESSURE: 63 MMHG | RESPIRATION RATE: 16 BRPM | SYSTOLIC BLOOD PRESSURE: 108 MMHG | BODY MASS INDEX: 28.61 KG/M2

## 2021-10-07 DIAGNOSIS — Z98.890 STATUS POST HYSTEROSCOPY: Primary | ICD-10-CM

## 2021-10-07 DIAGNOSIS — N95.0 PMB (POSTMENOPAUSAL BLEEDING): ICD-10-CM

## 2021-10-07 RX ORDER — LIDOCAINE 40 MG/G
CREAM TOPICAL
Status: DISCONTINUED | OUTPATIENT
Start: 2021-10-07 | End: 2021-10-08 | Stop reason: HOSPADM

## 2021-10-07 RX ORDER — SODIUM CHLORIDE, SODIUM LACTATE, POTASSIUM CHLORIDE, CALCIUM CHLORIDE 600; 310; 30; 20 MG/100ML; MG/100ML; MG/100ML; MG/100ML
INJECTION, SOLUTION INTRAVENOUS CONTINUOUS
Status: DISCONTINUED | OUTPATIENT
Start: 2021-10-07 | End: 2021-10-08 | Stop reason: HOSPADM

## 2021-10-07 RX ORDER — FENTANYL CITRATE 50 UG/ML
25 INJECTION, SOLUTION INTRAMUSCULAR; INTRAVENOUS EVERY 5 MIN PRN
Status: DISCONTINUED | OUTPATIENT
Start: 2021-10-07 | End: 2021-10-08 | Stop reason: HOSPADM

## 2021-10-07 RX ORDER — ONDANSETRON 2 MG/ML
INJECTION INTRAMUSCULAR; INTRAVENOUS PRN
Status: DISCONTINUED | OUTPATIENT
Start: 2021-10-07 | End: 2021-10-07

## 2021-10-07 RX ORDER — DEXAMETHASONE SODIUM PHOSPHATE 4 MG/ML
INJECTION, SOLUTION INTRA-ARTICULAR; INTRALESIONAL; INTRAMUSCULAR; INTRAVENOUS; SOFT TISSUE PRN
Status: DISCONTINUED | OUTPATIENT
Start: 2021-10-07 | End: 2021-10-07

## 2021-10-07 RX ORDER — OXYCODONE HYDROCHLORIDE 5 MG/1
5 TABLET ORAL EVERY 4 HOURS PRN
Status: DISCONTINUED | OUTPATIENT
Start: 2021-10-07 | End: 2021-10-08 | Stop reason: HOSPADM

## 2021-10-07 RX ORDER — MEPERIDINE HYDROCHLORIDE 25 MG/ML
12.5 INJECTION INTRAMUSCULAR; INTRAVENOUS; SUBCUTANEOUS
Status: DISCONTINUED | OUTPATIENT
Start: 2021-10-07 | End: 2021-10-08 | Stop reason: HOSPADM

## 2021-10-07 RX ORDER — IBUPROFEN 800 MG/1
800 TABLET, FILM COATED ORAL EVERY 6 HOURS PRN
Qty: 30 TABLET | Refills: 0 | Status: SHIPPED | OUTPATIENT
Start: 2021-10-07 | End: 2022-09-16

## 2021-10-07 RX ORDER — KETOROLAC TROMETHAMINE 15 MG/ML
INJECTION, SOLUTION INTRAMUSCULAR; INTRAVENOUS PRN
Status: DISCONTINUED | OUTPATIENT
Start: 2021-10-07 | End: 2021-10-07

## 2021-10-07 RX ORDER — OXYCODONE HYDROCHLORIDE 5 MG/1
5 TABLET ORAL
Status: DISCONTINUED | OUTPATIENT
Start: 2021-10-07 | End: 2021-10-08 | Stop reason: HOSPADM

## 2021-10-07 RX ORDER — ONDANSETRON 2 MG/ML
4 INJECTION INTRAMUSCULAR; INTRAVENOUS EVERY 30 MIN PRN
Status: DISCONTINUED | OUTPATIENT
Start: 2021-10-07 | End: 2021-10-08 | Stop reason: HOSPADM

## 2021-10-07 RX ORDER — FENTANYL CITRATE 50 UG/ML
INJECTION, SOLUTION INTRAMUSCULAR; INTRAVENOUS PRN
Status: DISCONTINUED | OUTPATIENT
Start: 2021-10-07 | End: 2021-10-07

## 2021-10-07 RX ORDER — LIDOCAINE HYDROCHLORIDE 20 MG/ML
INJECTION, SOLUTION INFILTRATION; PERINEURAL PRN
Status: DISCONTINUED | OUTPATIENT
Start: 2021-10-07 | End: 2021-10-07

## 2021-10-07 RX ORDER — HYDROMORPHONE HCL IN WATER/PF 6 MG/30 ML
0.2 PATIENT CONTROLLED ANALGESIA SYRINGE INTRAVENOUS EVERY 5 MIN PRN
Status: DISCONTINUED | OUTPATIENT
Start: 2021-10-07 | End: 2021-10-08 | Stop reason: HOSPADM

## 2021-10-07 RX ORDER — ONDANSETRON 4 MG/1
4 TABLET, ORALLY DISINTEGRATING ORAL EVERY 30 MIN PRN
Status: DISCONTINUED | OUTPATIENT
Start: 2021-10-07 | End: 2021-10-08 | Stop reason: HOSPADM

## 2021-10-07 RX ORDER — ACETAMINOPHEN 325 MG/1
975 TABLET ORAL ONCE
Status: DISCONTINUED | OUTPATIENT
Start: 2021-10-07 | End: 2021-10-08 | Stop reason: HOSPADM

## 2021-10-07 RX ORDER — PROPOFOL 10 MG/ML
INJECTION, EMULSION INTRAVENOUS PRN
Status: DISCONTINUED | OUTPATIENT
Start: 2021-10-07 | End: 2021-10-07

## 2021-10-07 RX ORDER — ACETAMINOPHEN 325 MG/1
975 TABLET ORAL ONCE
Status: COMPLETED | OUTPATIENT
Start: 2021-10-07 | End: 2021-10-07

## 2021-10-07 RX ORDER — IBUPROFEN 800 MG/1
800 TABLET, FILM COATED ORAL ONCE
Status: DISCONTINUED | OUTPATIENT
Start: 2021-10-07 | End: 2021-10-08 | Stop reason: HOSPADM

## 2021-10-07 RX ORDER — PROPOFOL 10 MG/ML
INJECTION, EMULSION INTRAVENOUS CONTINUOUS PRN
Status: DISCONTINUED | OUTPATIENT
Start: 2021-10-07 | End: 2021-10-07

## 2021-10-07 RX ORDER — FENTANYL CITRATE 50 UG/ML
25 INJECTION, SOLUTION INTRAMUSCULAR; INTRAVENOUS
Status: DISCONTINUED | OUTPATIENT
Start: 2021-10-07 | End: 2021-10-08 | Stop reason: HOSPADM

## 2021-10-07 RX ADMIN — PROPOFOL 100 MCG/KG/MIN: 10 INJECTION, EMULSION INTRAVENOUS at 08:35

## 2021-10-07 RX ADMIN — ACETAMINOPHEN 975 MG: 325 TABLET ORAL at 08:12

## 2021-10-07 RX ADMIN — FENTANYL CITRATE 50 MCG: 50 INJECTION, SOLUTION INTRAMUSCULAR; INTRAVENOUS at 08:32

## 2021-10-07 RX ADMIN — FENTANYL CITRATE 50 MCG: 50 INJECTION, SOLUTION INTRAMUSCULAR; INTRAVENOUS at 08:38

## 2021-10-07 RX ADMIN — ONDANSETRON 4 MG: 2 INJECTION INTRAMUSCULAR; INTRAVENOUS at 08:50

## 2021-10-07 RX ADMIN — PROPOFOL 30 MG: 10 INJECTION, EMULSION INTRAVENOUS at 08:37

## 2021-10-07 RX ADMIN — PROPOFOL 30 MG: 10 INJECTION, EMULSION INTRAVENOUS at 08:35

## 2021-10-07 RX ADMIN — KETOROLAC TROMETHAMINE 15 MG: 15 INJECTION, SOLUTION INTRAMUSCULAR; INTRAVENOUS at 08:51

## 2021-10-07 RX ADMIN — SODIUM CHLORIDE, SODIUM LACTATE, POTASSIUM CHLORIDE, CALCIUM CHLORIDE: 600; 310; 30; 20 INJECTION, SOLUTION INTRAVENOUS at 08:18

## 2021-10-07 RX ADMIN — DEXAMETHASONE SODIUM PHOSPHATE 4 MG: 4 INJECTION, SOLUTION INTRA-ARTICULAR; INTRALESIONAL; INTRAMUSCULAR; INTRAVENOUS; SOFT TISSUE at 08:40

## 2021-10-07 RX ADMIN — PROPOFOL 30 MG: 10 INJECTION, EMULSION INTRAVENOUS at 08:45

## 2021-10-07 RX ADMIN — LIDOCAINE HYDROCHLORIDE 40 MG: 20 INJECTION, SOLUTION INFILTRATION; PERINEURAL at 08:32

## 2021-10-07 ASSESSMENT — MIFFLIN-ST. JEOR: SCORE: 1391.21

## 2021-10-07 ASSESSMENT — LIFESTYLE VARIABLES: TOBACCO_USE: 1

## 2021-10-07 NOTE — ANESTHESIA POSTPROCEDURE EVALUATION
Patient: Jonn Watson    Procedure: Procedure(s):  HYSTEROSCOPY, WITH DILATION AND CURETTAGE       Diagnosis:PMB (postmenopausal bleeding) [N95.0]  Diagnosis Additional Information: No value filed.    Anesthesia Type:  MAC    Note:  Disposition: Outpatient   Postop Pain Control: Uneventful            Sign Out: Well controlled pain   PONV: No   Neuro/Psych: Uneventful            Sign Out: Acceptable/Baseline neuro status   Airway/Respiratory: Uneventful            Sign Out: Acceptable/Baseline resp. status   CV/Hemodynamics: Uneventful            Sign Out: Acceptable CV status; No obvious hypovolemia; No obvious fluid overload   Other NRE: NONE   DID A NON-ROUTINE EVENT OCCUR? No           Last vitals:  Vitals Value Taken Time   /63 10/07/21 0945   Temp 96.9  F (36.1  C) 10/07/21 0859   Pulse 61 10/07/21 0946   Resp 16 10/07/21 0930   SpO2 100 % 10/07/21 0946   Vitals shown include unvalidated device data.    Electronically Signed By: Zev Panda MD  October 7, 2021  10:01 AM

## 2021-10-07 NOTE — ANESTHESIA PREPROCEDURE EVALUATION
Anesthesia Pre-Procedure Evaluation    Patient: Jonn Watson   MRN: [id number] : 1960        Preoperative Diagnosis: PMB (postmenopausal bleeding) [N95.0]    Procedure : Procedure(s):  HYSTEROSCOPY, WITH DILATION AND CURETTAGE          Past Medical History:   Diagnosis Date     Anemia      Anxiety      Bladder infection      Bronchitis      Depression      Disease of thyroid gland      Endometriosis      GERD (gastroesophageal reflux disease)      Hiatal hernia      Hip pain     Right>Left     HL (hearing loss)      Hypertension      Kidney stone      Motion sickness       Past Surgical History:   Procedure Laterality Date     APPENDECTOMY       ARTHRODESIS TOE(S) Left      COMBINED CYSTOSCOPY, INSERT STENT URETER(S) Left 2016    Procedure: CYSTOSCOPY, LEFT URETEROSCOPY, STENT INSERTION, STONE EXTRACTION;  Surgeon: Patric Isabel MD;  Location: [facility];  Service:      CYSTOSCOPY W/ URETEROSCOPY       LAPAROSCOPY DIAGNOSTIC (GENERAL)      endometriosis     URETEROSCOPY        Allergies   Allergen Reactions     Erythromycin Base [Erythromycin] Anaphylaxis and Swelling     Sores in mouth also.     Losartan Other (See Comments)     General malaize     Sulfa (Sulfonamide Antibiotics) [Sulfa Drugs] Rash      Social History     Tobacco Use     Smoking status: Current Every Day Smoker     Smokeless tobacco: Former User     Tobacco comment: Casual smoker by hx   Substance Use Topics     Alcohol use: No      Wt Readings from Last 1 Encounters:   10/04/21 78.5 kg (173 lb)        Anesthesia Evaluation   Pt has had prior anesthetic.     No history of anesthetic complications       ROS/MED HX  ENT/Pulmonary:  - neg pulmonary ROS   (+) tobacco use, Current use,     Neurologic: Comment: Hearing loss - neg neurologic ROS     Cardiovascular:  - neg cardiovascular ROS   (+) hypertension-----    METS/Exercise Tolerance:     Hematologic:  - neg hematologic  ROS   (+) anemia,     Musculoskeletal:  - neg  musculoskeletal ROS     GI/Hepatic:  - neg GI/hepatic ROS   (+) GERD, Asymptomatic on medication, hiatal hernia,     Renal/Genitourinary:  - neg Renal ROS   (+) Nephrolithiasis ,     Endo:  - neg endo ROS   (+) thyroid problem, hypothyroidism,     Psychiatric/Substance Use:  - neg psychiatric ROS   (+) psychiatric history depression     Infectious Disease:  - neg infectious disease ROS     Malignancy:  - neg malignancy ROS     Other:  - neg other ROS          Physical Exam    Airway        Mallampati: II   TM distance: > 3 FB   Neck ROM: full   Mouth opening: > 3 cm    Respiratory Devices and Support         Dental  no notable dental history     (+) caps      Cardiovascular   cardiovascular exam normal       Rhythm and rate: regular and normal     Pulmonary   pulmonary exam normal        breath sounds clear to auscultation           OUTSIDE LABS:  CBC:   Lab Results   Component Value Date    WBC 9.5 02/25/2019    WBC 12.9 (H) 03/06/2018    HGB 15.1 02/25/2019    HGB 15.3 03/06/2018    HCT 44.2 02/25/2019    HCT 45.7 03/06/2018     02/25/2019     03/06/2018     BMP:   Lab Results   Component Value Date     02/25/2019     05/16/2018    POTASSIUM 3.8 02/25/2019    POTASSIUM 4.2 05/16/2018    CHLORIDE 107 02/25/2019    CHLORIDE 107 05/16/2018    CO2 26 02/25/2019    CO2 27 05/16/2018    BUN 10 02/25/2019    BUN 16 05/16/2018    CR 0.81 02/25/2019    CR 0.74 05/16/2018    GLC 90 02/25/2019    GLC 94 05/16/2018     COAGS: No results found for: PTT, INR, FIBR  POC: No results found for: BGM, HCG, HCGS  HEPATIC:   Lab Results   Component Value Date    ALBUMIN 4.5 02/25/2019    PROTTOTAL 7.6 02/25/2019    ALT 46 (H) 02/25/2019    AST 35 02/25/2019    ALKPHOS 101 02/25/2019    BILITOTAL 0.4 02/25/2019     OTHER:   Lab Results   Component Value Date    ELLY 10.0 02/25/2019    TSH 1.20 10/06/2021    CRP 0.3 01/08/2018    SED 10 01/08/2018       Anesthesia Plan    ASA Status:  2   NPO Status:  NPO  Appropriate    Anesthesia Type: MAC.     - Reason for MAC: immobility needed, straight local not clinically adequate   Induction: Propofol.   Maintenance: TIVA.        Consents    Anesthesia Plan(s) and associated risks, benefits, and realistic alternatives discussed. Questions answered and patient/representative(s) expressed understanding.     - Discussed with:  Patient         Postoperative Care    Pain management: Multi-modal analgesia.   PONV prophylaxis: Ondansetron (or other 5HT-3), Dexamethasone or Solumedrol     Comments:    Toradol if OK with surgeon    Reviewed anesthetic options and risks. Patient agrees to proceed.     10/1/21 covid negative            Zev Panda MD

## 2021-10-07 NOTE — DISCHARGE INSTRUCTIONS
You have received 975 mg of Acetaminophen (Tylenol) at 8:12 AM. Please do not take an additional dose of Tylenol until after 2:00 PM , and do not take more than 4,000 mg of Tylenol in a 24 hour period of time.  You  May take 2 extra strength Tylenol every 6 hours as needed after 2 pm.    You received a medication called Toradol (a stronger IV ibuprofen) at 850am. Do NOT take any Ibuprofen / Advil / Aleve / Naproxen or products containing Ibuprofen until 250pm or later.  Call Dr Romero with any questions or concerns [phone].      Discharge Instructions: After Your Surgery  You ve just had surgery. During surgery, you were given medicine called anesthesia to keep you relaxed and free of pain. After surgery, you may have some pain or nausea. This is common. Here are some tips for feeling better and getting well after surgery.  Going home  Your healthcare provider will show you how to take care of yourself when you go home. He or she will also answer your questions. Have an adult family member or friend drive you home. For the first 24 hours after your surgery:    Don't drive or use heavy equipment.    Don't make important decisions or sign legal papers.    Don't drink alcohol.    Have someone stay with you. He or she can watch for problems and help keep you safe.  Be sure to go to all follow-up visits with your healthcare provider. And rest after your surgery for as long as your healthcare provider tells you to.  Coping with pain  If you have pain after surgery, pain medicine will help you feel better. Take it as told, before pain becomes severe. Also, ask your healthcare provider or pharmacist about other ways to control pain. This might be with heat, ice, or relaxation. And follow any other instructions your surgeon or nurse gives you.  Tips for taking pain medicine  To get the best relief possible, remember these points:    Pain medicines can upset your stomach. Taking them with a little food may help.    Most  pain relievers taken by mouth need at least 20 to 30 minutes to start to work.    Don't wait till your pain becomes severe before you take your medicine. Try to time your medicine so that you can take it before starting an activity. This might be before you get dressed, go for a walk, or sit down for dinner.    Constipation is a common side effect of pain medicines. You may take laxatives or stool softeners to help ease constipation.  Drinking lots of fluids and eating foods such as fruits and vegetables that are high in fiber can also help.     Drinking alcohol and taking pain medicine can cause dizziness and slow your breathing. It can even be deadly. Don't drink alcohol while taking pain medicine.    Pain medicine can make you react more slowly to things. Don't drive or run machinery while taking pain medicine.  Your healthcare provider may tell you to take acetaminophen to help ease your pain. Ask him or her how much you are supposed to take each day. Acetaminophen or other pain relievers may interact with your prescription medicines or other over-the-counter (OTC) medicines. Some prescription medicines have acetaminophen and other ingredients. Using both prescription and OTC acetaminophen for pain can cause you to overdose. Read the labels on your OTC medicines with care. This will help you to clearly know the list of ingredients, how much to take, and any warnings. It may also help you not take too much acetaminophen. If you have questions or don't understand the information, ask your pharmacist or healthcare provider to explain it to you before you take the OTC medicine.  Managing nausea  Some people have an upset stomach after surgery. This is often because of anesthesia, pain, or pain medicine, or the stress of surgery. These tips will help you handle nausea and eat healthy foods as you get better. If you were on a special food plan before surgery, ask your healthcare provider if you should follow it while  you get better. These tips may help:    Don't push yourself to eat. Your body will tell you when to eat and how much.    Start off with clear liquids and soup. They are easier to digest.    Next try semi-solid foods, such as mashed potatoes, applesauce, and gelatin, as you feel ready.    Slowly move to solid foods. Don t eat fatty, rich, or spicy foods at first.    Don't force yourself to have 3 large meals a day. Instead eat smaller amounts more often.    Take pain medicines with a small amount of solid food, such as crackers or toast, to prevent nausea.  When to call your healthcare provider  Call your healthcare provider if:    You still have intolerable pain an hour after taking medicine. The medicine may not be strong enough.    You feel too sleepy, dizzy, or groggy. The medicine may be too strong.    You have side effects such as nausea or vomiting, or skin changes such as rash, itching, or hives. Your healthcare provider may suggest other medicines to control side effects.  Rash, itching, or hives may mean you have an allergic reaction. Report this right away. If you have trouble breathing or facial swelling, call 911 right away.  If you have obstructive sleep apnea  You were given anesthesia medicine during surgery to keep you comfortable and free of pain. After surgery, you may have more apnea spells because of this medicine and other medicines you were given. The spells may last longer than usual.   At home:    Keep using the continuous positive airway pressure (CPAP) device when you sleep. Unless your healthcare provider tells you not to, use it when you sleep, day or night. CPAP is a common device used to treat obstructive sleep apnea.    Talk with your provider before taking any pain medicine, muscle relaxants, or sedatives. Your provider will tell you about the possible dangers of taking these medicines.  [employer] last reviewed this educational content on 3/1/2019    9084-6894 [employer]  [employer]. All rights reserved. This information is not intended as a substitute for professional medical care. Always follow your healthcare professional's instructions.

## 2021-10-07 NOTE — ANESTHESIA CARE TRANSFER NOTE
Patient: Jonn Watson    Procedure: Procedure(s):  HYSTEROSCOPY, WITH DILATION AND CURETTAGE       Diagnosis: PMB (postmenopausal bleeding) [N95.0]  Diagnosis Additional Information: No value filed.    Anesthesia Type:   MAC     Note:    Oropharynx: oropharynx clear of all foreign objects and spontaneously breathing  Level of Consciousness: drowsy  Oxygen Supplementation: face mask  Level of Supplemental Oxygen (L/min / FiO2): 6  Independent Airway: airway patency satisfactory and stable  Dentition: dentition unchanged  Vital Signs Stable: post-procedure vital signs reviewed and stable  Report to RN Given: handoff report given  Patient transferred to: Phase II    Handoff Report: Identifed the Patient, Identified the Reponsible Provider, Reviewed the pertinent medical history, Discussed the surgical course, Reviewed Intra-OP anesthesia mangement and issues during anesthesia, Set expectations for post-procedure period and Allowed opportunity for questions and acknowledgement of understanding      Vitals:  Vitals Value Taken Time   /67 10/07/21 0859   Temp 96.9  F (36.1  C) 10/07/21 0859   Pulse 61 10/07/21 0858   Resp 16 10/07/21 0859   SpO2 97 % 10/07/21 0859   Vitals shown include unvalidated device data.    Electronically Signed By: EVITA FLORES CRNA  October 7, 2021  9:00 AM

## 2021-10-07 NOTE — OP NOTE
Hysteroscopy, D&C    Name: Jonn Watson   MRN: [id number]   DATE OF SERVICE:  10/7/2021     PREOPERATIVE DIAGNOSIS: thickened endometrium, post menopausal bleeding    POSTOPERATIVE DIAGNOSIS: thickened endometrium, post menopausal bleeding, uterine polyp    PROCEDURES: Hysteroscopy, dilatation and curettage,     SURGEON: Norma Romero DO     ASSISTANT: none      ANESTHESIA:  mac      ESTIMATED BLOOD LOSS:   5 cc      HISTORY OF PRESENT ILLNESS:  This is a 61 year old year old female with history of thickened endometrium. She had a transvaginal ultrasound which showed thickened endometrium, EMB in office showed benign tissue however, patient continued to have bleeding and EMT was 12mm so decision was made for further investigation with hysteroscopy D&C. She was given informed consent for the above procedure. The risks, benefits and alternatives were discussed with her. She expressed understanding and wished to proceed.       PROCEDURE:  The patient was brought to the operating room and after induction of general anesthesia was prepped and draped in the dorsal lithotomy position. A time out was called and the patient and the procedure were verified.  A bimanual exam was done, indicating normal sized anteverted uterus. No other abnormalities were noted.       The bladder was drained of 30cc of clear yellow urine with a straight catheter. A sterile speculum was placed. The anterior lip of the cervix were grasped with single tooth tenaculum. Uterus was then sounded to 6.5cm. The cervix was gently dilated using Mansi dilators and the hysteroscope was introduced. Cavity of the uterus was noted to be small with a centrally located endometrial polyp. The decision was made at this time to use the myosure reach device. This was used to remove the centrally located polyp. Once it was removed, a second small polyp was noted at the right fundal region which was removed. At this point endometrial curettings were obtained in all  quadrants, and the tissue was submitted for pathologic exam. At this point good hemostasis was noted with this portion of the procedure. Instruments were removed from vagina. No active bleeding was noted. Sponge and needle counts were correct X 2. She was taken to recovery in stable condition.      Norma Romero, DO  [facility]  [phone]

## 2021-10-13 DIAGNOSIS — F41.9 ANXIETY: ICD-10-CM

## 2021-10-13 DIAGNOSIS — I10 ESSENTIAL HYPERTENSION: ICD-10-CM

## 2021-10-14 RX ORDER — ATENOLOL 50 MG/1
TABLET ORAL
Qty: 90 TABLET | Refills: 0 | Status: SHIPPED | OUTPATIENT
Start: 2021-10-14 | End: 2022-01-15

## 2021-10-14 RX ORDER — SERTRALINE HYDROCHLORIDE 100 MG/1
TABLET, FILM COATED ORAL
Qty: 180 TABLET | Refills: 2 | Status: SHIPPED | OUTPATIENT
Start: 2021-10-14 | End: 2022-05-16

## 2021-10-14 NOTE — TELEPHONE ENCOUNTER
"Routing refill request to provider for review/approval because:  PCP to review break in medication    Sertraline Last Written Prescription Date:  1/8/2021  Last Fill Quantity: 180,  # refills: 2   Last office visit provider:  10/6/2021 Oscar Fair CNP     Last Written Prescription Date:  4/16/2021  Last Fill Quantity: 90,  # refills: 0   Last office visit provider:  10/6/2021 Oscar Fair CNP       sertraline (ZOLOFT) 100 MG tablet 180 tablet 2 1/8/2021  No   Sig: TAKE 2 TABLETS(200 MG) BY MOUTH DAILY   Sent to pharmacy as: sertraline 100 mg tablet (ZOLOFT)   E-Prescribing Status: Receipt confirmed by pharmacy (1/8/2021 10:49 AM CST     atenoloL (TENORMIN) 50 MG tablet 90 tablet 0 4/16/2021  No   Sig: TAKE 1 TABLET(50 MG) BY MOUTH DAILY   Sent to pharmacy as: atenoloL 50 mg tablet (TENORMIN)   E-Prescribing Status: Receipt confirmed by pharmacy (4/16/2021 10:14 AM CDT         Requested Prescriptions   Pending Prescriptions Disp Refills     sertraline (ZOLOFT) 100 MG tablet [Pharmacy Med Name: SERTRALINE 100MG TABLETS] 180 tablet 2     Sig: TAKE 2 TABLETS(200 MG) BY MOUTH DAILY       SSRIs Protocol Failed - 10/13/2021  5:45 AM        Failed - Recent (12 mo) or future (30 days) visit within the authorizing provider's specialty     Patient has had an office visit with the authorizing provider or a provider within the authorizing providers department within the previous 12 mos or has a future within next 30 days. See \"Patient Info\" tab in inbasket, or \"Choose Columns\" in Meds & Orders section of the refill encounter.              Passed - Medication is active on med list        Passed - Patient is age 18 or older        Passed - No active pregnancy on record        Passed - No positive pregnancy test in last 12 months           atenolol (TENORMIN) 50 MG tablet [Pharmacy Med Name: ATENOLOL 50MG TABLETS] 90 tablet 0     Sig: TAKE 1 TABLET(50 MG) BY MOUTH DAILY       Beta-Blockers Protocol Failed - 10/13/2021  5:45 " "AM        Failed - Recent (12 mo) or future (30 days) visit within the authorizing provider's specialty     Patient has had an office visit with the authorizing provider or a provider within the authorizing providers department within the previous 12 mos or has a future within next 30 days. See \"Patient Info\" tab in inbasket, or \"Choose Columns\" in Meds & Orders section of the refill encounter.              Passed - Blood pressure under 140/90 in past 12 months     BP Readings from Last 3 Encounters:   10/07/21 108/63   10/06/21 (!) 140/80   04/22/20 (!) 150/77                 Passed - Patient is age 6 or older        Passed - Medication is active on med list             Hannah Arroyo RN 10/13/21 9:26 PM  "

## 2021-10-17 ENCOUNTER — HEALTH MAINTENANCE LETTER (OUTPATIENT)
Age: 61
End: 2021-10-17

## 2021-10-27 DIAGNOSIS — F51.02 ADJUSTMENT INSOMNIA: ICD-10-CM

## 2021-10-28 NOTE — TELEPHONE ENCOUNTER
"Former patient of Jack & has not established care with another provider.  Please assign refill request to covering provider per clinic standard process.    Routing refill request to provider for review/approval because:  No PCP    Last Written Prescription Date:  6/9/21  Last Fill Quantity: 30,  # refills: 4   Last office visit provider:  10/6/21 pre-op     Requested Prescriptions   Pending Prescriptions Disp Refills     hydrOXYzine (ATARAX) 10 MG tablet [Pharmacy Med Name: HYDROXYZINE HCL 10MG TABLETS] 30 tablet 4     Sig: TAKE 1 TO 1 AND 1/2 TABLETS BY MOUTH AT BEDTIME AS NEEDED. OVERDUE FOR OFFICE VISIT       Antihistamines Protocol Failed - 10/27/2021  9:52 AM        Failed - Recent (12 mo) or future (30 days) visit within the authorizing provider's specialty     Patient has had an office visit with the authorizing provider or a provider within the authorizing providers department within the previous 12 mos or has a future within next 30 days. See \"Patient Info\" tab in inbasket, or \"Choose Columns\" in Meds & Orders section of the refill encounter.              Passed - Patient is age 3 or older     Apply age and/or weight-based dosing for peds patients age 3 and older.    Forward request to provider for patients under the age of 3.          Passed - Medication is active on med list             Jessica Green RN 10/28/21 11:55 AM  "

## 2021-11-02 RX ORDER — HYDROXYZINE HYDROCHLORIDE 10 MG/1
TABLET, FILM COATED ORAL
Qty: 30 TABLET | Refills: 4 | Status: SHIPPED | OUTPATIENT
Start: 2021-11-02 | End: 2022-02-25

## 2021-11-02 NOTE — TELEPHONE ENCOUNTER
Would you mind filling this for patient? You saw them on 10/6/21.  Waleska Gill CMA ............... 4:27 PM, 11/02/21

## 2021-12-07 DIAGNOSIS — E03.9 ACQUIRED HYPOTHYROIDISM: ICD-10-CM

## 2021-12-08 NOTE — TELEPHONE ENCOUNTER
"Routing refill request to provider for review/approval because:  Patient needs to be seen because it has been more than 1 year since last office visit.  NO PCPr    Last Written Prescription Date:  8/30/21  Last Fill Quantity: 90,  # refills: 0   Last office visit provider:  10/6/21     Requested Prescriptions   Pending Prescriptions Disp Refills     levothyroxine (SYNTHROID/LEVOTHROID) 50 MCG tablet [Pharmacy Med Name: LEVOTHYROXINE 0.05MG (50MCG) TAB] 90 tablet 0     Sig: TAKE 1 TABLET BY MOUTH EVERY DAY AT 6:00AM       Thyroid Protocol Failed - 12/7/2021 11:45 AM        Failed - Recent (12 mo) or future (30 days) visit within the authorizing provider's specialty     Patient has had an office visit with the authorizing provider or a provider within the authorizing providers department within the previous 12 mos or has a future within next 30 days. See \"Patient Info\" tab in inbasket, or \"Choose Columns\" in Meds & Orders section of the refill encounter.              Passed - Patient is 12 years or older        Passed - Medication is active on med list        Passed - Normal TSH on file in past 12 months     Recent Labs   Lab Test 10/06/21  1408   TSH 1.20              Passed - No active pregnancy on record     If patient is pregnant or has had a positive pregnancy test, please check TSH.          Passed - No positive pregnancy test in past 12 months     If patient is pregnant or has had a positive pregnancy test, please check TSH.               Benjamin Aldana RN 12/08/21 2:50 PM  "

## 2021-12-14 RX ORDER — LEVOTHYROXINE SODIUM 50 UG/1
TABLET ORAL
Qty: 90 TABLET | Refills: 0 | Status: SHIPPED | OUTPATIENT
Start: 2021-12-14 | End: 2022-01-11

## 2022-01-11 DIAGNOSIS — E03.9 ACQUIRED HYPOTHYROIDISM: ICD-10-CM

## 2022-01-11 RX ORDER — LEVOTHYROXINE SODIUM 50 UG/1
50 TABLET ORAL DAILY
Qty: 90 TABLET | Refills: 0 | Status: SHIPPED | OUTPATIENT
Start: 2022-01-11 | End: 2022-04-11

## 2022-01-12 DIAGNOSIS — I10 ESSENTIAL HYPERTENSION: ICD-10-CM

## 2022-01-13 ENCOUNTER — TRANSFERRED RECORDS (OUTPATIENT)
Dept: HEALTH INFORMATION MANAGEMENT | Facility: CLINIC | Age: 62
End: 2022-01-13
Payer: COMMERCIAL

## 2022-01-14 NOTE — TELEPHONE ENCOUNTER
"Routing refill request to provider for review/approval because:  Patient needs to be seen because it has been more than 1 year since last office visit with authorizing provider.    Last Written Prescription Date:  10/14/2021  Last Fill Quantity: 90,  # refills: 0   Last office visit provider:  Chandrakant Jack MD on 4/22/2020    Requested Prescriptions   Pending Prescriptions Disp Refills     atenolol (TENORMIN) 50 MG tablet [Pharmacy Med Name: ATENOLOL 50MG TABLETS] 90 tablet 0     Sig: TAKE 1 TABLET(50 MG) BY MOUTH DAILY       Beta-Blockers Protocol Failed - 1/12/2022  8:31 AM        Failed - Recent (12 mo) or future (30 days) visit within the authorizing provider's specialty     Patient has had an office visit with the authorizing provider or a provider within the authorizing providers department within the previous 12 mos or has a future within next 30 days. See \"Patient Info\" tab in inbasket, or \"Choose Columns\" in Meds & Orders section of the refill encounter.              Passed - Blood pressure under 140/90 in past 12 months     BP Readings from Last 3 Encounters:   10/07/21 108/63   10/06/21 (!) 140/80   04/22/20 (!) 150/77                 Passed - Patient is age 6 or older        Passed - Medication is active on med list             Alannah Angel RN 01/14/22 2:31 PM  "

## 2022-01-15 RX ORDER — ATENOLOL 50 MG/1
TABLET ORAL
Qty: 90 TABLET | Refills: 0 | Status: SHIPPED | OUTPATIENT
Start: 2022-01-15 | End: 2022-04-11

## 2022-01-27 DIAGNOSIS — B00.1 COLD SORE: ICD-10-CM

## 2022-01-27 RX ORDER — VALACYCLOVIR HYDROCHLORIDE 1 G/1
TABLET, FILM COATED ORAL
Qty: 0.001 TABLET | Refills: 0 | Status: SHIPPED | OUTPATIENT
Start: 2022-01-27 | End: 2022-05-23

## 2022-02-23 DIAGNOSIS — F51.02 ADJUSTMENT INSOMNIA: ICD-10-CM

## 2022-02-23 DIAGNOSIS — F41.1 ANXIETY STATE: ICD-10-CM

## 2022-02-23 DIAGNOSIS — B00.1 COLD SORE: ICD-10-CM

## 2022-02-23 NOTE — TELEPHONE ENCOUNTER
Routing refill request to provider for review/approval because:  Controlled Substance Request     Last Written Prescription Date:  10/5/21  Last Fill Quantity: 30,  # refills: 0   Last office visit provider:  9/16/2020     Requested Prescriptions   Pending Prescriptions Disp Refills     LORazepam (ATIVAN) 0.5 MG tablet [Pharmacy Med Name: LORAZEPAM 0.5MG TABLETS] 30 tablet      Sig: TAKE 1 TABLET(0.5 MG) BY MOUTH THREE TIMES DAILY AS NEEDED FOR ANXIETY       There is no refill protocol information for this order          Alba Melchor RN 02/23/22 12:07 PM

## 2022-02-24 ENCOUNTER — NURSE TRIAGE (OUTPATIENT)
Dept: NURSING | Facility: CLINIC | Age: 62
End: 2022-02-24
Payer: COMMERCIAL

## 2022-02-24 NOTE — TELEPHONE ENCOUNTER
Patient is calling for refills.  Patient's PCP retired.  She doesn't know how to get her refills.  She does have an appointment on 4/1 with Larisa Whitt.    Patient needs the following medications refilled.    Ativan 0.5 MG  Atarax 10MG  Valtrex 1000MG    [facility].    Will route message to provider pool on who to send these refill requests to.    Radha Allison RN   02/24/22 5:47 PM  [facility] Nurse Advisor    Reason for Disposition    [1] Caller requesting a NON-URGENT new prescription or refill AND [2] triager unable to refill per unit policy    Additional Information    Negative: Drug overdose and triager unable to answer question    Negative: Caller requesting information unrelated to medicine    Negative: Caller requesting a prescription for Strep throat and has a positive culture result    Negative: Rash while taking a medication or within 3 days of stopping it    Negative: Immunization reaction suspected    Negative: [1] Asthma and [2] having symptoms of asthma (cough, wheezing, etc.)    Negative: [1] Influenza symptoms AND [2] anti-viral med prescription request, such as Tamiflu    Negative: [1] Symptom of illness (e.g., headache, abdominal pain, earache, vomiting) AND [2] more than mild    Negative: MORE THAN A DOUBLE DOSE of a prescription or over-the-counter (OTC) drug    Negative: [1] DOUBLE DOSE (an extra dose or lesser amount) of over-the-counter (OTC) drug AND [2] any symptoms (e.g., dizziness, nausea, pain, sleepiness)    Negative: [1] DOUBLE DOSE (an extra dose or lesser amount) of prescription drug AND [2] any symptoms (e.g., dizziness, nausea, pain, sleepiness)    Negative: Took another person's prescription drug    Negative: [1] DOUBLE DOSE (an extra dose or lesser amount) of prescription drug AND [2] NO symptoms (Exception: a double dose of antibiotics)    Negative: Diabetes drug error or overdose (e.g., took wrong type of insulin or took extra dose)     "Negative: [1] Request for URGENT new prescription or refill of \"essential\" medication (i.e., likelihood of harm to patient if not taken) AND [2] triager unable to fill per unit policy    Negative: [1] Prescription not at pharmacy AND [2] was prescribed by PCP recently    Negative: [1] Pharmacy calling with prescription questions AND [2] triager unable to answer question    Negative: [1] Caller has URGENT medication question about med that PCP or specialist prescribed AND [2] triager unable to answer question    Negative: [1] Caller has NON-URGENT medication question about med that PCP prescribed AND [2] triager unable to answer question    Protocols used: MEDICATION QUESTION CALL-A-AH      "

## 2022-02-25 RX ORDER — VALACYCLOVIR HYDROCHLORIDE 1 G/1
TABLET, FILM COATED ORAL
Qty: 24 TABLET | Refills: 1 | Status: SHIPPED | OUTPATIENT
Start: 2022-02-25 | End: 2022-05-16

## 2022-02-25 RX ORDER — HYDROXYZINE HYDROCHLORIDE 10 MG/1
TABLET, FILM COATED ORAL
Qty: 30 TABLET | Refills: 4 | Status: SHIPPED | OUTPATIENT
Start: 2022-02-25 | End: 2022-09-16

## 2022-02-25 RX ORDER — LORAZEPAM 0.5 MG/1
TABLET ORAL
Qty: 30 TABLET | Refills: 0 | Status: SHIPPED | OUTPATIENT
Start: 2022-02-25 | End: 2022-05-16

## 2022-02-25 NOTE — TELEPHONE ENCOUNTER
Left message for patient that this was completed.  Waleska Gill CMA ............... 7:49 AM, 02/25/22

## 2022-03-24 ENCOUNTER — TRANSFERRED RECORDS (OUTPATIENT)
Dept: HEALTH INFORMATION MANAGEMENT | Facility: CLINIC | Age: 62
End: 2022-03-24
Payer: COMMERCIAL

## 2022-04-11 DIAGNOSIS — E03.9 ACQUIRED HYPOTHYROIDISM: ICD-10-CM

## 2022-04-11 RX ORDER — LEVOTHYROXINE SODIUM 50 UG/1
TABLET ORAL
Qty: 0.0001 TABLET | Refills: 0 | Status: SHIPPED | OUTPATIENT
Start: 2022-04-11 | End: 2022-05-16

## 2022-05-16 ENCOUNTER — OFFICE VISIT (OUTPATIENT)
Dept: INTERNAL MEDICINE | Facility: CLINIC | Age: 62
End: 2022-05-16
Payer: COMMERCIAL

## 2022-05-16 VITALS
OXYGEN SATURATION: 98 % | DIASTOLIC BLOOD PRESSURE: 94 MMHG | SYSTOLIC BLOOD PRESSURE: 160 MMHG | WEIGHT: 183 LBS | HEIGHT: 66 IN | HEART RATE: 84 BPM | BODY MASS INDEX: 29.41 KG/M2

## 2022-05-16 DIAGNOSIS — F41.9 ANXIETY: ICD-10-CM

## 2022-05-16 DIAGNOSIS — F33.1 MODERATE EPISODE OF RECURRENT MAJOR DEPRESSIVE DISORDER (H): ICD-10-CM

## 2022-05-16 DIAGNOSIS — M81.6 LOCALIZED OSTEOPOROSIS WITHOUT CURRENT PATHOLOGICAL FRACTURE: ICD-10-CM

## 2022-05-16 DIAGNOSIS — Z76.89 ENCOUNTER TO ESTABLISH CARE: ICD-10-CM

## 2022-05-16 DIAGNOSIS — Z12.31 ENCOUNTER FOR SCREENING MAMMOGRAM FOR BREAST CANCER: ICD-10-CM

## 2022-05-16 DIAGNOSIS — K21.00 GASTROESOPHAGEAL REFLUX DISEASE WITH ESOPHAGITIS WITHOUT HEMORRHAGE: ICD-10-CM

## 2022-05-16 DIAGNOSIS — H91.93 BILATERAL HEARING LOSS, UNSPECIFIED HEARING LOSS TYPE: ICD-10-CM

## 2022-05-16 DIAGNOSIS — E03.9 ACQUIRED HYPOTHYROIDISM: ICD-10-CM

## 2022-05-16 DIAGNOSIS — I10 ESSENTIAL HYPERTENSION: ICD-10-CM

## 2022-05-16 DIAGNOSIS — Z12.11 ENCOUNTER FOR SCREENING COLONOSCOPY: ICD-10-CM

## 2022-05-16 PROBLEM — N95.0 POSTMENOPAUSAL BLEEDING: Status: ACTIVE | Noted: 2022-05-16

## 2022-05-16 PROBLEM — K44.9 HIATAL HERNIA: Status: ACTIVE | Noted: 2022-05-16

## 2022-05-16 PROBLEM — F41.1 GAD (GENERALIZED ANXIETY DISORDER): Status: ACTIVE | Noted: 2017-01-24

## 2022-05-16 PROCEDURE — 96127 BRIEF EMOTIONAL/BEHAV ASSMT: CPT | Performed by: NURSE PRACTITIONER

## 2022-05-16 PROCEDURE — 99214 OFFICE O/P EST MOD 30 MIN: CPT | Performed by: NURSE PRACTITIONER

## 2022-05-16 RX ORDER — LORAZEPAM 0.5 MG/1
TABLET ORAL
Qty: 30 TABLET | Refills: 0 | Status: SHIPPED | OUTPATIENT
Start: 2022-05-16 | End: 2022-09-12

## 2022-05-16 RX ORDER — SERTRALINE HYDROCHLORIDE 100 MG/1
TABLET, FILM COATED ORAL
Qty: 180 TABLET | Refills: 3 | Status: SHIPPED | OUTPATIENT
Start: 2022-05-16 | End: 2023-04-07

## 2022-05-16 RX ORDER — LEVOTHYROXINE SODIUM 50 UG/1
TABLET ORAL
Qty: 0.0001 TABLET | Refills: 0 | Status: SHIPPED | OUTPATIENT
Start: 2022-05-16 | End: 2022-06-27

## 2022-05-16 RX ORDER — DULOXETIN HYDROCHLORIDE 30 MG/1
30 CAPSULE, DELAYED RELEASE ORAL AT BEDTIME
Qty: 90 CAPSULE | Refills: 0 | Status: SHIPPED | OUTPATIENT
Start: 2022-05-16 | End: 2022-08-01 | Stop reason: SINTOL

## 2022-05-16 RX ORDER — ATENOLOL 50 MG/1
TABLET ORAL
Qty: 90 TABLET | Refills: 3 | Status: SHIPPED | OUTPATIENT
Start: 2022-05-16 | End: 2022-05-25

## 2022-05-16 ASSESSMENT — ANXIETY QUESTIONNAIRES
6. BECOMING EASILY ANNOYED OR IRRITABLE: SEVERAL DAYS
1. FEELING NERVOUS, ANXIOUS, OR ON EDGE: SEVERAL DAYS
3. WORRYING TOO MUCH ABOUT DIFFERENT THINGS: SEVERAL DAYS
GAD7 TOTAL SCORE: 7
7. FEELING AFRAID AS IF SOMETHING AWFUL MIGHT HAPPEN: SEVERAL DAYS
5. BEING SO RESTLESS THAT IT IS HARD TO SIT STILL: SEVERAL DAYS
2. NOT BEING ABLE TO STOP OR CONTROL WORRYING: SEVERAL DAYS
IF YOU CHECKED OFF ANY PROBLEMS ON THIS QUESTIONNAIRE, HOW DIFFICULT HAVE THESE PROBLEMS MADE IT FOR YOU TO DO YOUR WORK, TAKE CARE OF THINGS AT HOME, OR GET ALONG WITH OTHER PEOPLE: SOMEWHAT DIFFICULT

## 2022-05-16 ASSESSMENT — PATIENT HEALTH QUESTIONNAIRE - PHQ9
SUM OF ALL RESPONSES TO PHQ QUESTIONS 1-9: 4
5. POOR APPETITE OR OVEREATING: SEVERAL DAYS

## 2022-05-16 NOTE — PROGRESS NOTES
Assessment & Plan     Encounter to establish care: Care established today.    Anxiety: KVNG-7 score today was a 6. She continues in therapy. Will add in Cymbalta. Follow up in 6 weeks.  - sertraline (ZOLOFT) 100 MG tablet  Dispense: 180 tablet; Refill: 3  - LORazepam (ATIVAN) 0.5 MG tablet  Dispense: 30 tablet; Refill: 0  - DULoxetine (CYMBALTA) 30 MG capsule  Dispense: 90 capsule; Refill: 0    Moderate episode of recurrent major depressive disorder (H): PHQ-9 score today was a 4. She continues in therapy. Will add on cymbalta to help with her physical signs of depression. Follow up in 6 weeks.   - DULoxetine (CYMBALTA) 30 MG capsule  Dispense: 90 capsule; Refill: 0    Essential hypertension: Blood pressure today in office was 160/94. She will start monitoring these at home in the AM and PM. Follow up in 6 weeks.  - atenolol (TENORMIN) 50 MG tablet  Dispense: 90 tablet; Refill: 3    Acquired hypothyroidism: Refilled her levothyroxine today. Will recheck TSH at follow up.  - levothyroxine (SYNTHROID/LEVOTHROID) 50 MCG tablet  Dispense: 0.0001 tablet; Refill: 0    Gastroesophageal reflux disease with esophagitis without hemorrhage: She continues on omeprazole. Stable.    Bilateral hearing loss, unspecified hearing loss type: With scarring of TM's and white/milky fluid, will refer to ENT. She does not have a history of ear tubes. Will refer to ENT.  - Otolaryngology Referral    Localized osteoporosis without current pathological fracture: Hx of this. Will recheck a bone density scan.   - DX Hip/Pelvis/Spine    Encounter for screening mammogram for breast cancer: Ordered today.  - *MA Screening Digital Bilateral    Encounter for screening colonoscopy: ordered today.  - Adult Gastro Ref - Procedure Only    Tobacco Cessation:   reports that she has been smoking cigarettes. She has quit using smokeless tobacco.  Tobacco Cessation Action Plan: Information offered: Patient not interested at this time    BMI:   Estimated  "body mass index is 29.99 kg/m  as calculated from the following:    Height as of this encounter: 1.664 m (5' 5.5\").    Weight as of this encounter: 83 kg (183 lb).   Weight management plan: Discussed healthy diet and exercise guidelines    Return in about 6 weeks (around 6/27/2022) for Follow up, Routine preventive.    Larisa Larson CNP  [facility]    Melinda Lunsford is a 62 year old who presents for the following health issues     HPI     The patient presents today to establish care.    Reviewed her past surgical history with her.    She reports that her older sister has a history of a brain aneurism.    She has had a brain MRA that was normal in the past, last being in 2014.    Her Mom is alive, has a history of depression and hypertension. Her Dad passed at age 78 in 2015, had arthritis, hypertension, and heart attacks.    Mom had breast cancer years ago.    She reports that she works as a . Exercise is usually hiking with her dog.    She reports that her significant other, Pepper committed suicide in 2019. She has been trying to cope with this since then.    She does speak with a therapist. She continues on sertraline and lorazepam as needed. She still has more physical symptoms of depression she describes, will try Cymbalta to help with this.    She also has a history of osteoporosis, will recheck a bone density scan. She is also due for a mammogram.    She reports that she has a long standing history of hearing loss. She sees [facility], is using loaner hearing aids right now.     She denies other concerns today.    Review of Systems   Constitutional, HEENT, cardiovascular, pulmonary, gi and gu systems are negative, except as otherwise noted.      Objective    BP (!) 160/94   Pulse 84   Ht 1.664 m (5' 5.5\")   Wt 83 kg (183 lb)   SpO2 98%   BMI 29.99 kg/m    Body mass index is 29.99 kg/m .  Physical Exam   GENERAL: healthy, alert and no distress  EYES: " Eyes grossly normal to inspection, PERRL and conjunctivae and sclerae normal  HENT: ear canals normal, bilateral TM's appear to be scarred, white milky substance behind them, nose and mouth without ulcers or lesions  NECK: no adenopathy, no asymmetry, masses, or scars and thyroid normal to palpation  RESP: lungs clear to auscultation - no rales, rhonchi or wheezes  CV: regular rate and rhythm, normal S1 S2, no S3 or S4, no murmur, click or rub, no peripheral edema and peripheral pulses strong  MS: no gross musculoskeletal defects noted, no edema  SKIN: no suspicious lesions or rashes  NEURO: Normal strength and tone, mentation intact and speech normal  PSYCH: mentation appears normal, affect normal/bright

## 2022-05-16 NOTE — PATIENT INSTRUCTIONS
Start the Cymbalta at bedtime to help with your anxiety and depression, if having any side effects stop this medication and update me.    The cymbalta hopefully will help with those physical signs of depression and your anxiety.     To schedule the mammogram and bone density scan call [phone].    To schedule the colonoscopy, call [phone] (after July 6th).    I have referred you to see [facility]. Call to schedule your appointment for your ears.    Come back to have your labs and physical in 6 weeks. Please let me know if anything comes up before then.

## 2022-05-17 ASSESSMENT — ANXIETY QUESTIONNAIRES: GAD7 TOTAL SCORE: 7

## 2022-05-23 ENCOUNTER — MYC MEDICAL ADVICE (OUTPATIENT)
Dept: INTERNAL MEDICINE | Facility: CLINIC | Age: 62
End: 2022-05-23
Payer: COMMERCIAL

## 2022-05-23 DIAGNOSIS — I10 ESSENTIAL HYPERTENSION: ICD-10-CM

## 2022-05-23 DIAGNOSIS — B00.1 COLD SORE: ICD-10-CM

## 2022-05-23 NOTE — TELEPHONE ENCOUNTER
Patient is requesting a refill of her Atenolol-90 days supply and Valtrex 1,000 mg tablet.    Patient states that in the past her last prescription for Valtrex was to take 2 tablets every 12 hours as needed for cold sores, but prior to that it was take 1 tablet every 12 hours prn.     Please send new rx for Valtrex with the correct directions that she should be one. Patient states that when her cold sores come on they are really bad.     Patient has question if she should continue to take Cymbalta along with new rx Sertraline.     Please advise, send "[employer]hart message or call her.    Ok to leave a message.

## 2022-05-24 ENCOUNTER — TRANSFERRED RECORDS (OUTPATIENT)
Dept: HEALTH INFORMATION MANAGEMENT | Facility: CLINIC | Age: 62
End: 2022-05-24
Payer: COMMERCIAL

## 2022-05-25 RX ORDER — VALACYCLOVIR HYDROCHLORIDE 1 G/1
1000 TABLET, FILM COATED ORAL 2 TIMES DAILY
Qty: 14 TABLET | Refills: 1 | Status: SHIPPED | OUTPATIENT
Start: 2022-05-25 | End: 2023-06-02

## 2022-05-25 RX ORDER — ATENOLOL 50 MG/1
50 TABLET ORAL DAILY
Qty: 90 TABLET | Refills: 3 | Status: SHIPPED | OUTPATIENT
Start: 2022-05-25 | End: 2023-04-07

## 2022-05-26 ENCOUNTER — TELEPHONE (OUTPATIENT)
Dept: INTERNAL MEDICINE | Facility: CLINIC | Age: 62
End: 2022-05-26
Payer: COMMERCIAL

## 2022-05-26 NOTE — TELEPHONE ENCOUNTER
Patient has question if she should continue to take Cymbalta along with new rx Sertraline. Please call pt back.    Ok to leave detail message.    Maddie Waddell

## 2022-06-20 ENCOUNTER — E-VISIT (OUTPATIENT)
Dept: INTERNAL MEDICINE | Facility: CLINIC | Age: 62
End: 2022-06-20
Payer: COMMERCIAL

## 2022-06-20 DIAGNOSIS — K62.5 RECTAL BLEEDING: Primary | ICD-10-CM

## 2022-06-20 DIAGNOSIS — R30.0 DYSURIA: ICD-10-CM

## 2022-06-20 PROCEDURE — 99207 PR NON-BILLABLE SERV PER CHARTING: CPT | Performed by: NURSE PRACTITIONER

## 2022-06-20 NOTE — PATIENT INSTRUCTIONS
Thank you for choosing us for your care. I think an in-clinic visit would be best next steps based on your symptoms. Please schedule a clinic appointment; you won t be charged for this eVisit.      You can schedule an appointment right here in [facility], or call [phone]    Thank you for choosing us for your care. Based on the information provided, I believe you need to be seen immediately.  Please go  Urgent Care, ER, or a clinic  as soon as possible.     You will not be charged for this eVisit.

## 2022-06-21 ENCOUNTER — OFFICE VISIT (OUTPATIENT)
Dept: INTERNAL MEDICINE | Facility: CLINIC | Age: 62
End: 2022-06-21
Payer: COMMERCIAL

## 2022-06-21 VITALS
OXYGEN SATURATION: 95 % | BODY MASS INDEX: 30.14 KG/M2 | SYSTOLIC BLOOD PRESSURE: 148 MMHG | DIASTOLIC BLOOD PRESSURE: 92 MMHG | HEART RATE: 86 BPM | WEIGHT: 183.9 LBS

## 2022-06-21 DIAGNOSIS — K92.2 ACUTE LOWER GI BLEEDING: ICD-10-CM

## 2022-06-21 LAB
ERYTHROCYTE [DISTWIDTH] IN BLOOD BY AUTOMATED COUNT: 13.6 % (ref 10–15)
HCT VFR BLD AUTO: 45.8 % (ref 35–47)
HGB BLD-MCNC: 15.2 G/DL (ref 11.7–15.7)
MCH RBC QN AUTO: 30.3 PG (ref 26.5–33)
MCHC RBC AUTO-ENTMCNC: 33.2 G/DL (ref 31.5–36.5)
MCV RBC AUTO: 91 FL (ref 78–100)
PLATELET # BLD AUTO: 196 10E3/UL (ref 150–450)
RBC # BLD AUTO: 5.01 10E6/UL (ref 3.8–5.2)
WBC # BLD AUTO: 11.9 10E3/UL (ref 4–11)

## 2022-06-21 PROCEDURE — 85027 COMPLETE CBC AUTOMATED: CPT | Performed by: INTERNAL MEDICINE

## 2022-06-21 PROCEDURE — 99214 OFFICE O/P EST MOD 30 MIN: CPT | Performed by: INTERNAL MEDICINE

## 2022-06-21 PROCEDURE — 36415 COLL VENOUS BLD VENIPUNCTURE: CPT | Performed by: INTERNAL MEDICINE

## 2022-06-21 NOTE — PROGRESS NOTES
Assessment & Plan     Acute lower GI bleeding  62-year-old woman here with acute onset of lower GI bleeding.  She describes lower abdominal cramping last week followed by moderately large amount of bright red blood passed along with a bowel movement.  Stools have remained normal otherwise.  No diarrhea.  She has had 3 or 4 recurrent episodes of bright red blood.  No nausea.  Appetite is good.  No unintentional weight loss.  Last colonoscopy was at least 5 years ago.  She recalls polyps being removed.  No family history of colon cancer.  No family history of ulcerative colitis.  She does not take aspirin or any blood thinners.  Exam with abdomen soft and nontender with no hepatosplenomegaly or masses.  She is hemodynamically stable.  Lower GI bleed.  We discussed possible etiologies including internal hemorrhoids, diverticulosis, colitis and malignancy.  Will obtain CBC.  If significant anemia is found, more urgent work-up is needed including possible inpatient hospitalization.  Assuming hemoglobin is within normal range, we will try to get expedited diagnostic colonoscopy completed.  - CBC with platelets; Future  - Adult Gastro Ref - Procedure Only; Future  - CBC with platelets               Return in about 6 days (around 6/27/2022) for Routine preventive.    Rolando Singh MD  [facility]    Melinda Lunsford is a 62 year old, presenting for the following health issues: Lower GI bleeding for the past week associated with lower abdominal cramping.  See assessment plan for details  Rectal Problem      History of Present Illness       Reason for visit:  Blood with stool fatigue  Symptom onset:  3-7 days ago  Symptom intensity:  Moderate  Symptom progression:  Staying the same  Had these symptoms before:  No    She eats 0-1 servings of fruits and vegetables daily.She consumes 0 sweetened beverage(s) daily.She exercises with enough effort to increase her heart rate 20 to 29  minutes per day.  She exercises with enough effort to increase her heart rate 3 or less days per week.   She is taking medications regularly.         Review of Systems   No nausea or vomiting.      Objective    BP (!) 148/92 (BP Location: Right arm, Patient Position: Sitting, Cuff Size: Adult Regular)   Pulse 86   Wt 83.4 kg (183 lb 14.4 oz)   SpO2 95%   BMI 30.14 kg/m    Body mass index is 30.14 kg/m .  Physical Exam   Well-appearing woman who does not appear acutely ill  Abdomen soft without hepatosplenomegaly masses or tenderness        .  ..

## 2022-06-23 ENCOUNTER — LAB (OUTPATIENT)
Dept: LAB | Facility: CLINIC | Age: 62
End: 2022-06-23
Payer: COMMERCIAL

## 2022-06-23 DIAGNOSIS — R30.0 DYSURIA: ICD-10-CM

## 2022-06-23 LAB
ALBUMIN UR-MCNC: NEGATIVE MG/DL
APPEARANCE UR: CLEAR
BACTERIA #/AREA URNS HPF: ABNORMAL /HPF
BILIRUB UR QL STRIP: NEGATIVE
COLOR UR AUTO: YELLOW
GLUCOSE UR STRIP-MCNC: NEGATIVE MG/DL
HGB UR QL STRIP: NEGATIVE
KETONES UR STRIP-MCNC: NEGATIVE MG/DL
LEUKOCYTE ESTERASE UR QL STRIP: NEGATIVE
NITRATE UR QL: POSITIVE
PH UR STRIP: 6.5 [PH] (ref 5–8)
RBC #/AREA URNS AUTO: ABNORMAL /HPF
SP GR UR STRIP: 1.02 (ref 1–1.03)
SQUAMOUS #/AREA URNS AUTO: ABNORMAL /LPF
UROBILINOGEN UR STRIP-ACNC: 0.2 E.U./DL
WBC #/AREA URNS AUTO: ABNORMAL /HPF

## 2022-06-23 PROCEDURE — 87186 SC STD MICRODIL/AGAR DIL: CPT | Mod: 59

## 2022-06-23 PROCEDURE — 87086 URINE CULTURE/COLONY COUNT: CPT

## 2022-06-23 PROCEDURE — 81001 URINALYSIS AUTO W/SCOPE: CPT

## 2022-06-26 LAB
BACTERIA UR CULT: ABNORMAL
BACTERIA UR CULT: ABNORMAL

## 2022-06-27 ENCOUNTER — OFFICE VISIT (OUTPATIENT)
Dept: INTERNAL MEDICINE | Facility: CLINIC | Age: 62
End: 2022-06-27
Payer: COMMERCIAL

## 2022-06-27 ENCOUNTER — TELEPHONE (OUTPATIENT)
Dept: INTERNAL MEDICINE | Facility: CLINIC | Age: 62
End: 2022-06-27

## 2022-06-27 VITALS
HEART RATE: 70 BPM | DIASTOLIC BLOOD PRESSURE: 86 MMHG | WEIGHT: 182 LBS | BODY MASS INDEX: 29.25 KG/M2 | OXYGEN SATURATION: 98 % | HEIGHT: 66 IN | SYSTOLIC BLOOD PRESSURE: 160 MMHG

## 2022-06-27 DIAGNOSIS — I10 ESSENTIAL HYPERTENSION: ICD-10-CM

## 2022-06-27 DIAGNOSIS — K62.5 RECTAL BLEEDING: ICD-10-CM

## 2022-06-27 DIAGNOSIS — Z13.0 SCREENING FOR ENDOCRINE, NUTRITIONAL, METABOLIC AND IMMUNITY DISORDER: ICD-10-CM

## 2022-06-27 DIAGNOSIS — K21.00 GASTROESOPHAGEAL REFLUX DISEASE WITH ESOPHAGITIS WITHOUT HEMORRHAGE: ICD-10-CM

## 2022-06-27 DIAGNOSIS — Z13.29 SCREENING FOR ENDOCRINE, NUTRITIONAL, METABOLIC AND IMMUNITY DISORDER: ICD-10-CM

## 2022-06-27 DIAGNOSIS — Z13.220 SCREENING FOR HYPERLIPIDEMIA: ICD-10-CM

## 2022-06-27 DIAGNOSIS — Z13.228 SCREENING FOR ENDOCRINE, NUTRITIONAL, METABOLIC AND IMMUNITY DISORDER: ICD-10-CM

## 2022-06-27 DIAGNOSIS — Z11.59 NEED FOR HEPATITIS C SCREENING TEST: ICD-10-CM

## 2022-06-27 DIAGNOSIS — N30.00 ACUTE CYSTITIS WITHOUT HEMATURIA: Primary | ICD-10-CM

## 2022-06-27 DIAGNOSIS — M85.89 OSTEOPENIA OF MULTIPLE SITES: ICD-10-CM

## 2022-06-27 DIAGNOSIS — Z00.00 ANNUAL PHYSICAL EXAM: ICD-10-CM

## 2022-06-27 DIAGNOSIS — Z13.21 SCREENING FOR ENDOCRINE, NUTRITIONAL, METABOLIC AND IMMUNITY DISORDER: ICD-10-CM

## 2022-06-27 DIAGNOSIS — Z11.4 SCREENING FOR HIV (HUMAN IMMUNODEFICIENCY VIRUS): ICD-10-CM

## 2022-06-27 DIAGNOSIS — E03.9 ACQUIRED HYPOTHYROIDISM: ICD-10-CM

## 2022-06-27 DIAGNOSIS — F33.1 MODERATE EPISODE OF RECURRENT MAJOR DEPRESSIVE DISORDER (H): ICD-10-CM

## 2022-06-27 DIAGNOSIS — F41.9 ANXIETY: ICD-10-CM

## 2022-06-27 LAB
ALBUMIN SERPL-MCNC: 4.1 G/DL (ref 3.5–5)
ALP SERPL-CCNC: 122 U/L (ref 45–120)
ALT SERPL W P-5'-P-CCNC: 34 U/L (ref 0–45)
ANION GAP SERPL CALCULATED.3IONS-SCNC: 11 MMOL/L (ref 5–18)
AST SERPL W P-5'-P-CCNC: 28 U/L (ref 0–40)
BASOPHILS # BLD AUTO: 0.1 10E3/UL (ref 0–0.2)
BASOPHILS NFR BLD AUTO: 1 %
BILIRUB SERPL-MCNC: 0.3 MG/DL (ref 0–1)
BUN SERPL-MCNC: 14 MG/DL (ref 8–22)
CALCIUM SERPL-MCNC: 9.4 MG/DL (ref 8.5–10.5)
CHLORIDE BLD-SCNC: 108 MMOL/L (ref 98–107)
CHOLEST SERPL-MCNC: 293 MG/DL
CO2 SERPL-SCNC: 23 MMOL/L (ref 22–31)
CREAT SERPL-MCNC: 0.78 MG/DL (ref 0.6–1.1)
EOSINOPHIL # BLD AUTO: 0.2 10E3/UL (ref 0–0.7)
EOSINOPHIL NFR BLD AUTO: 3 %
ERYTHROCYTE [DISTWIDTH] IN BLOOD BY AUTOMATED COUNT: 13.5 % (ref 10–15)
FASTING STATUS PATIENT QL REPORTED: ABNORMAL
GFR SERPL CREATININE-BSD FRML MDRD: 86 ML/MIN/1.73M2
GLUCOSE BLD-MCNC: 107 MG/DL (ref 70–125)
HCT VFR BLD AUTO: 44.3 % (ref 35–47)
HCV AB SERPL QL IA: NONREACTIVE
HDLC SERPL-MCNC: 52 MG/DL
HGB BLD-MCNC: 14.8 G/DL (ref 11.7–15.7)
HIV 1+2 AB+HIV1 P24 AG SERPL QL IA: NEGATIVE
IMM GRANULOCYTES # BLD: 0 10E3/UL
IMM GRANULOCYTES NFR BLD: 0 %
LDLC SERPL CALC-MCNC: 184 MG/DL
LYMPHOCYTES # BLD AUTO: 1.8 10E3/UL (ref 0.8–5.3)
LYMPHOCYTES NFR BLD AUTO: 23 %
MCH RBC QN AUTO: 30.2 PG (ref 26.5–33)
MCHC RBC AUTO-ENTMCNC: 33.4 G/DL (ref 31.5–36.5)
MCV RBC AUTO: 90 FL (ref 78–100)
MONOCYTES # BLD AUTO: 0.5 10E3/UL (ref 0–1.3)
MONOCYTES NFR BLD AUTO: 6 %
NEUTROPHILS # BLD AUTO: 5.2 10E3/UL (ref 1.6–8.3)
NEUTROPHILS NFR BLD AUTO: 67 %
PLATELET # BLD AUTO: 179 10E3/UL (ref 150–450)
POTASSIUM BLD-SCNC: 3.8 MMOL/L (ref 3.5–5)
PROT SERPL-MCNC: 7 G/DL (ref 6–8)
RBC # BLD AUTO: 4.9 10E6/UL (ref 3.8–5.2)
SODIUM SERPL-SCNC: 142 MMOL/L (ref 136–145)
TRIGL SERPL-MCNC: 285 MG/DL
TSH SERPL DL<=0.005 MIU/L-ACNC: 0.96 UIU/ML (ref 0.3–5)
WBC # BLD AUTO: 7.7 10E3/UL (ref 4–11)

## 2022-06-27 PROCEDURE — 96127 BRIEF EMOTIONAL/BEHAV ASSMT: CPT | Performed by: NURSE PRACTITIONER

## 2022-06-27 PROCEDURE — 80050 GENERAL HEALTH PANEL: CPT | Performed by: NURSE PRACTITIONER

## 2022-06-27 PROCEDURE — 90471 IMMUNIZATION ADMIN: CPT | Performed by: NURSE PRACTITIONER

## 2022-06-27 PROCEDURE — 87389 HIV-1 AG W/HIV-1&-2 AB AG IA: CPT | Performed by: NURSE PRACTITIONER

## 2022-06-27 PROCEDURE — 99396 PREV VISIT EST AGE 40-64: CPT | Mod: 25 | Performed by: NURSE PRACTITIONER

## 2022-06-27 PROCEDURE — 86803 HEPATITIS C AB TEST: CPT | Performed by: NURSE PRACTITIONER

## 2022-06-27 PROCEDURE — 36415 COLL VENOUS BLD VENIPUNCTURE: CPT | Performed by: NURSE PRACTITIONER

## 2022-06-27 PROCEDURE — 80061 LIPID PANEL: CPT | Performed by: NURSE PRACTITIONER

## 2022-06-27 PROCEDURE — 99214 OFFICE O/P EST MOD 30 MIN: CPT | Mod: 25 | Performed by: NURSE PRACTITIONER

## 2022-06-27 PROCEDURE — 90715 TDAP VACCINE 7 YRS/> IM: CPT | Performed by: NURSE PRACTITIONER

## 2022-06-27 RX ORDER — AMLODIPINE BESYLATE 5 MG/1
5 TABLET ORAL DAILY
Qty: 90 TABLET | Refills: 3 | Status: SHIPPED | OUTPATIENT
Start: 2022-06-27 | End: 2022-08-01 | Stop reason: SINTOL

## 2022-06-27 RX ORDER — NITROFURANTOIN 25; 75 MG/1; MG/1
100 CAPSULE ORAL 2 TIMES DAILY
Qty: 14 CAPSULE | Refills: 0 | Status: SHIPPED | OUTPATIENT
Start: 2022-06-27 | End: 2022-08-01

## 2022-06-27 RX ORDER — LEVOTHYROXINE SODIUM 50 UG/1
TABLET ORAL
Qty: 90 TABLET | Refills: 3 | Status: SHIPPED | OUTPATIENT
Start: 2022-06-27 | End: 2022-06-29

## 2022-06-27 ASSESSMENT — ENCOUNTER SYMPTOMS: ABDOMINAL PAIN: 1

## 2022-06-27 ASSESSMENT — ANXIETY QUESTIONNAIRES
IF YOU CHECKED OFF ANY PROBLEMS ON THIS QUESTIONNAIRE, HOW DIFFICULT HAVE THESE PROBLEMS MADE IT FOR YOU TO DO YOUR WORK, TAKE CARE OF THINGS AT HOME, OR GET ALONG WITH OTHER PEOPLE: NOT DIFFICULT AT ALL
7. FEELING AFRAID AS IF SOMETHING AWFUL MIGHT HAPPEN: NOT AT ALL
3. WORRYING TOO MUCH ABOUT DIFFERENT THINGS: NOT AT ALL
GAD7 TOTAL SCORE: 2
1. FEELING NERVOUS, ANXIOUS, OR ON EDGE: SEVERAL DAYS
GAD7 TOTAL SCORE: 2
5. BEING SO RESTLESS THAT IT IS HARD TO SIT STILL: NOT AT ALL
2. NOT BEING ABLE TO STOP OR CONTROL WORRYING: NOT AT ALL
6. BECOMING EASILY ANNOYED OR IRRITABLE: SEVERAL DAYS

## 2022-06-27 ASSESSMENT — PATIENT HEALTH QUESTIONNAIRE - PHQ9
5. POOR APPETITE OR OVEREATING: NOT AT ALL
SUM OF ALL RESPONSES TO PHQ QUESTIONS 1-9: 3

## 2022-06-27 NOTE — PATIENT INSTRUCTIONS
Start the Macrobid, take this twice daily x7 days for the bladder infection.    You received your tetanus shot today.  Is normal for the injection site to be sore or red for the next 24 to 48 hours.    Get your Shingrix vaccines, 2 doses at a community pharmacy such as [email or web address].    Start the Norvasc or amlodipine 5 mg daily.  Continue daily blood pressures, 1 hour after medications.  Record your readings, send me a [employer] message with these in 2 weeks.    Goals for blood pressure are less than 140/90, greater than 100/60.  Watch your salt intake, goals are less than 3 g/day.  Start exercising again.    To schedule your bone density test call [phone].    Your Pap smear is due in April 2023.    I will plan on seeing you back in about 3 months for follow-up, before then if anything comes up.

## 2022-06-27 NOTE — TELEPHONE ENCOUNTER
Please update the patient and let her know that her urine culture grew out e coli. I send in Macrobid 100mg twice daily x 7 days. Follow up if symptoms persist or worsen.

## 2022-06-27 NOTE — PROGRESS NOTES
SUBJECTIVE:   CC: Jonn Watson is an 62 year old woman who presents for preventive health visit.       Patient has been advised of split billing requirements and indicates understanding: Yes  The patient presents today for her annual physical. She is fasted today.    She will be having a colonoscopy done tomorrow. She has recently had rectal bleeding, which has resolved. Last week she reports not feeling well overall, was very fatigued, so she came in to see Dr. Singh, he ordered the Colonoscopy. She will be starting her prep this afternoon. She does have a history of hemorrhoids, had constipation which has resolved when she had the bleeding.    She reports that she is currently fostering a [place], and because of this she has not been exercising/hiking as much as she previously was. She reports gaining about 20 pounds or so.    Her blood pressures have been high at home. Lowest was 123/71; highest was 164/99. She is on atenolol, only. She had a cough with Lisinopril, fatigue with Losartan in the past. Will start her on Norvasc.    She reports that she is not eating a lot of salt. She does cook.    Discussed her last bone density scan done in 2018 showed osteopenia, she is due for follow up of this. Mammogram is scheduled for July.    We will giver her a tetanus shot today. Discussed getting her Shingrix shot in the community.    Healthy Habits:     Getting at least 3 servings of Calcium per day:  Yes    Bi-annual eye exam:  Yes    Dental care twice a year:  Yes    Sleep apnea or symptoms of sleep apnea:  None    Diet:  Regular (no restrictions)    Frequency of exercise:  2-3 days/week    Duration of exercise:  15-30 minutes    Taking medications regularly:  Yes    Medication side effects:  Other    PHQ-2 Total Score: 0    Additional concerns today:  No    Today's PHQ-2 Score:   PHQ-2 ( 1999 [employer]) 6/27/2022   Q1: Little interest or pleasure in doing things 0   Q2: Feeling down, depressed or  hopeless 0   PHQ-2 Score 0   Q1: Little interest or pleasure in doing things Not at all   Q2: Feeling down, depressed or hopeless Not at all   PHQ-2 Score 0       Abuse: Current or Past (Physical, Sexual or Emotional) - No  Do you feel safe in your environment? Yes    Have you ever done Advance Care Planning? (For example, a Health Directive, POLST, or a discussion with a medical provider or your loved ones about your wishes): No, advance care planning information given to patient to review.  Advanced care planning was discussed at today's visit.    Social History     Tobacco Use     Smoking status: Current Some Day Smoker     Types: Cigarettes     Smokeless tobacco: Former User     Tobacco comment: Casual smoker by hx   Substance Use Topics     Alcohol use: No         Alcohol Use 6/27/2022   Prescreen: >3 drinks/day or >7 drinks/week? No       Reviewed orders with patient.  Reviewed health maintenance and updated orders accordingly - Yes  Lab work is in process    Breast Cancer Screening:    Breast CA Risk Assessment (FHS-7) 3/29/2022   Do you have a family history of breast, colon, or ovarian cancer? No / Unknown       Mammogram Screening: Recommended mammography every 1-2 years with patient discussion and risk factor consideration  Pertinent mammograms are reviewed under the imaging tab.    History of abnormal Pap smear: NO - age 30-65 PAP every 5 years with negative HPV co-testing recommended  PAP / HPV 4/21/2017   HPV See Scanned Report     Reviewed and updated as needed this visit by clinical staff   Tobacco  Allergies  Meds                Reviewed and updated as needed this visit by Provider                   Past Medical History:   Diagnosis Date     Anemia      Anxiety      Bladder infection      Bronchitis      Depression      Disease of thyroid gland      Endometriosis      GERD (gastroesophageal reflux disease)      Hiatal hernia      Hip pain     Right>Left     HL (hearing loss)      Hypertension   "    Kidney stone      Motion sickness       Past Surgical History:   Procedure Laterality Date     APPENDECTOMY       ARTHRODESIS TOE(S) Left      COMBINED CYSTOSCOPY, INSERT STENT URETER(S) Left 11/11/2016    Procedure: CYSTOSCOPY, LEFT URETEROSCOPY, STENT INSERTION, STONE EXTRACTION;  Surgeon: Ptaric Isabel MD;  Location: [facility];  Service:      CYSTOSCOPY W/ URETEROSCOPY       DILATION AND CURETTAGE, OPERATIVE HYSTEROSCOPY, COMBINED N/A 10/7/2021    Procedure: HYSTEROSCOPY, WITH DILATION AND CURETTAGE;  Surgeon: Norma Romero DO;  Location: [facility]     LAPAROSCOPY DIAGNOSTIC (GENERAL)      endometriosis     URETEROSCOPY         Review of Systems   Gastrointestinal: Positive for abdominal pain.   Genitourinary: Positive for pelvic pain.     CONSTITUTIONAL: NEGATIVE for fever, chills, change in weight  INTEGUMENTARY/SKIN: NEGATIVE for worrisome rashes, moles or lesions  EYES: NEGATIVE for vision changes or irritation  ENT: NEGATIVE for ear, mouth and throat problems  RESP: NEGATIVE for significant cough or SOB  BREAST: NEGATIVE for masses, tenderness or discharge  CV: NEGATIVE for chest pain, palpitations or peripheral edema  GI: NEGATIVE for nausea, abdominal pain, heartburn, or change in bowel habits  : NEGATIVE for unusual urinary or vaginal symptoms. No vaginal bleeding.  MUSCULOSKELETAL: NEGATIVE for significant arthralgias or myalgia  NEURO: NEGATIVE for weakness, dizziness or paresthesias  PSYCHIATRIC: NEGATIVE for changes in mood or affect      OBJECTIVE:   BP (!) 162/86 (BP Location: Right arm, Patient Position: Sitting, Cuff Size: Adult Regular)   Pulse 70   Ht 1.664 m (5' 5.5\")   Wt 82.6 kg (182 lb)   SpO2 98%   BMI 29.83 kg/m    Physical Exam  GENERAL: healthy, alert and no distress  EYES: Eyes grossly normal to inspection, PERRL and conjunctivae and sclerae normal  HENT: ear canals and TM's normal, nose and mouth without ulcers or lesions  NECK: no adenopathy, no " asymmetry, masses, or scars and thyroid normal to palpation  RESP: lungs clear to auscultation - no rales, rhonchi or wheezes  CV: regular rate and rhythm, normal S1 S2, no S3 or S4, no murmur, click or rub, no peripheral edema and peripheral pulses strong  ABDOMEN: soft, nontender, no hepatosplenomegaly, no masses and bowel sounds normal  MS: no gross musculoskeletal defects noted, no edema  SKIN: no suspicious lesions or rashes  NEURO: Normal strength and tone, mentation intact and speech normal  PSYCH: mentation appears normal, affect normal/bright    Diagnostic Test Results: Mammogram, Dexa, Pap  Labs reviewed in Epic    ASSESSMENT/PLAN:   Jonn was seen today for physical.    Diagnoses and all orders for this visit:    Annual physical exam: Completed today. Fasted labs ordered.    Rectal bleeding: Hx of this, has resolved. Colonoscopy is tomorrow. Constipation has resolved. Hx of hemorrhoids.     Essential hypertension: Blood pressure today was elevated at 162/86. She is on atenolol. Home blood pressures have also remained elevated. Will add in Norvasc 5mg daily. My chart message with blood pressures in 2 weeks. Continue diet and exercise. Goals for blood pressure are under 140/90; greater than 100/60. Discussed salt intake less than 3 grams per day.   -     amLODIPine (NORVASC) 5 MG tablet; Take 1 tablet (5 mg) by mouth daily  -     Comprehensive metabolic panel (BMP + Alb, Alk Phos, ALT, AST, Total. Bili, TP); Future  -     Continue Atenolol.     Anxiety: KVNG-7 score today was a 2. She continues on Cymbalta, Zoloft, and Lorazepam PRN. Stable.    Moderate episode of recurrent major depressive disorder (H): PHQ-9 score today was a 3. She continues on Cymbalta and Zoloft. Stable.    Gastroesophageal reflux disease with esophagitis without hemorrhage: She continues on Omeprazole. Stable.     Osteopenia of multiple sites: She continues on calcium and vitamin D, weight bearing exercise. Repeat scan ordered, last was  "done in 2018.  -     DX Hip/Pelvis/Spine; Future    Acquired hypothyroidism: Hx of this, refilled medication today. Will recheck TSH.   -     levothyroxine (SYNTHROID/LEVOTHROID) 50 MCG tablet  -     TSH with free T4 reflex    Screening for endocrine, nutritional, metabolic and immunity disorder  -     CBC with platelets and differential; Future  -     Comprehensive metabolic panel (BMP + Alb, Alk Phos, ALT, AST, Total. Bili, TP); Future    Screening for hyperlipidemia  -     Lipid panel reflex to direct LDL Fasting    Screening for HIV (human immunodeficiency virus)  -     HIV Antigen Antibody Combo    Need for hepatitis C screening test  -     Hepatitis C Screen Reflex to HCV RNA Quant and Genotype    Other orders  -     TDAP VACCINE (Adacel, Boostrix)    COUNSELING:  Reviewed preventive health counseling, as reflected in patient instructions  Special attention given to:        Regular exercise       Healthy diet/nutrition       Vision screening    Estimated body mass index is 29.83 kg/m  as calculated from the following:    Height as of this encounter: 1.664 m (5' 5.5\").    Weight as of this encounter: 82.6 kg (182 lb).    Weight management plan: Discussed healthy diet and exercise guidelines Patient was referred to their PCP to discuss a diet and exercise plan.    She reports that she has been smoking cigarettes. She has quit using smokeless tobacco.  Tobacco Cessation Action Plan:   Information offered: Patient not interested at this time      Counseling Resources:  ATP IV Guidelines  Pooled Cohorts Equation Calculator  Breast Cancer Risk Calculator  BRCA-Related Cancer Risk Assessment: FHS-7 Tool  FRAX Risk Assessment  ICSI Preventive Guidelines  Dietary Guidelines for Americans, 2010  USDA's MyPlate  ASA Prophylaxis  Lung CA Screening    Larisa Larson CNP  [facility]  "

## 2022-06-28 ENCOUNTER — TRANSFERRED RECORDS (OUTPATIENT)
Dept: HEALTH INFORMATION MANAGEMENT | Facility: CLINIC | Age: 62
End: 2022-06-28

## 2022-07-05 ENCOUNTER — HOSPITAL ENCOUNTER (OUTPATIENT)
Dept: MAMMOGRAPHY | Facility: CLINIC | Age: 62
Discharge: HOME | End: 2022-07-05
Attending: NURSE PRACTITIONER | Admitting: NURSE PRACTITIONER
Payer: COMMERCIAL

## 2022-07-05 DIAGNOSIS — Z12.31 ENCOUNTER FOR SCREENING MAMMOGRAM FOR BREAST CANCER: ICD-10-CM

## 2022-07-05 PROCEDURE — 77067 SCR MAMMO BI INCL CAD: CPT

## 2022-07-07 ENCOUNTER — HOSPITAL ENCOUNTER (OUTPATIENT)
Dept: MAMMOGRAPHY | Facility: CLINIC | Age: 62
Discharge: HOME | End: 2022-07-07
Attending: NURSE PRACTITIONER | Admitting: NURSE PRACTITIONER
Payer: COMMERCIAL

## 2022-07-07 DIAGNOSIS — N64.89 BREAST ASYMMETRY: ICD-10-CM

## 2022-07-07 PROCEDURE — 76642 ULTRASOUND BREAST LIMITED: CPT | Mod: LT

## 2022-08-01 ENCOUNTER — OFFICE VISIT (OUTPATIENT)
Dept: INTERNAL MEDICINE | Facility: CLINIC | Age: 62
End: 2022-08-01
Payer: COMMERCIAL

## 2022-08-01 ENCOUNTER — MEDICAL CORRESPONDENCE (OUTPATIENT)
Dept: HEALTH INFORMATION MANAGEMENT | Facility: CLINIC | Age: 62
End: 2022-08-01

## 2022-08-01 VITALS
HEART RATE: 68 BPM | SYSTOLIC BLOOD PRESSURE: 148 MMHG | DIASTOLIC BLOOD PRESSURE: 82 MMHG | WEIGHT: 183 LBS | BODY MASS INDEX: 29.41 KG/M2 | OXYGEN SATURATION: 98 % | HEIGHT: 66 IN

## 2022-08-01 DIAGNOSIS — Z01.818 PRE-OP EXAM: ICD-10-CM

## 2022-08-01 DIAGNOSIS — R30.9 PAINFUL URINATION: ICD-10-CM

## 2022-08-01 DIAGNOSIS — F41.9 ANXIETY: ICD-10-CM

## 2022-08-01 DIAGNOSIS — K21.00 GASTROESOPHAGEAL REFLUX DISEASE WITH ESOPHAGITIS WITHOUT HEMORRHAGE: ICD-10-CM

## 2022-08-01 DIAGNOSIS — E03.9 ACQUIRED HYPOTHYROIDISM: ICD-10-CM

## 2022-08-01 DIAGNOSIS — H91.93 BILATERAL HEARING LOSS, UNSPECIFIED HEARING LOSS TYPE: ICD-10-CM

## 2022-08-01 DIAGNOSIS — E78.5 HYPERLIPIDEMIA LDL GOAL <130: ICD-10-CM

## 2022-08-01 DIAGNOSIS — F33.1 MODERATE EPISODE OF RECURRENT MAJOR DEPRESSIVE DISORDER (H): ICD-10-CM

## 2022-08-01 DIAGNOSIS — I10 ESSENTIAL HYPERTENSION: ICD-10-CM

## 2022-08-01 DIAGNOSIS — K63.5 POLYP OF ASCENDING COLON, UNSPECIFIED TYPE: ICD-10-CM

## 2022-08-01 DIAGNOSIS — E66.3 OVERWEIGHT (BMI 25.0-29.9): ICD-10-CM

## 2022-08-01 DIAGNOSIS — K62.5 RECTAL BLEEDING: ICD-10-CM

## 2022-08-01 LAB
ALBUMIN UR-MCNC: NEGATIVE MG/DL
APPEARANCE UR: CLEAR
BACTERIA #/AREA URNS HPF: ABNORMAL /HPF
BILIRUB UR QL STRIP: NEGATIVE
COLOR UR AUTO: YELLOW
GLUCOSE UR STRIP-MCNC: NEGATIVE MG/DL
HGB UR QL STRIP: NEGATIVE
KETONES UR STRIP-MCNC: NEGATIVE MG/DL
LEUKOCYTE ESTERASE UR QL STRIP: NEGATIVE
MUCOUS THREADS #/AREA URNS LPF: PRESENT /LPF
NITRATE UR QL: POSITIVE
PH UR STRIP: 5.5 [PH] (ref 5–8)
RBC #/AREA URNS AUTO: ABNORMAL /HPF
SP GR UR STRIP: 1.02 (ref 1–1.03)
SQUAMOUS #/AREA URNS AUTO: ABNORMAL /LPF
UROBILINOGEN UR STRIP-ACNC: 0.2 E.U./DL
WBC #/AREA URNS AUTO: ABNORMAL /HPF

## 2022-08-01 PROCEDURE — 93005 ELECTROCARDIOGRAM TRACING: CPT | Performed by: NURSE PRACTITIONER

## 2022-08-01 PROCEDURE — 99214 OFFICE O/P EST MOD 30 MIN: CPT | Performed by: NURSE PRACTITIONER

## 2022-08-01 PROCEDURE — 93010 ELECTROCARDIOGRAM REPORT: CPT | Performed by: INTERNAL MEDICINE

## 2022-08-01 PROCEDURE — 81001 URINALYSIS AUTO W/SCOPE: CPT | Performed by: NURSE PRACTITIONER

## 2022-08-01 PROCEDURE — 87186 SC STD MICRODIL/AGAR DIL: CPT | Performed by: NURSE PRACTITIONER

## 2022-08-01 PROCEDURE — 87086 URINE CULTURE/COLONY COUNT: CPT | Performed by: NURSE PRACTITIONER

## 2022-08-01 RX ORDER — LISINOPRIL 20 MG/1
20 TABLET ORAL DAILY
Qty: 90 TABLET | Refills: 0 | Status: SHIPPED | OUTPATIENT
Start: 2022-08-01 | End: 2022-09-12

## 2022-08-01 NOTE — PATIENT INSTRUCTIONS
Hold your Ibuprofen, Aleve, and Advil 7 days prior to surgery.    I have ordered the gene sight testing, they will send this to you in the mail.    Start the Lisinopril 20mg daily. Continue checking your blood pressures at home, goals are below 140/90; greater than 100/60.     Take your blood pressure one hour after medication.    If your dry cough is worse, please let me know we can consider other blood pressure medications.    Follow up in 4-6 weeks for a recheck, to check kidney function, electrolytes, and blood pressure. We can also talk about your gene sight testing if it is back by then.

## 2022-08-01 NOTE — PROGRESS NOTES
[facility]  [address] [address]  Phone: [phone]  Fax: [phone]  Primary Provider: Larisa Larson    PREOPERATIVE EVALUATION:  Today's date: 8/1/2022    Jonn Watson is a 62 year old female who presents for a preoperative evaluation.    Surgical Information:  Surgery/Procedure: Polyp removal  Sur[facility] Location: [facility]  Surgeon: Dr Ruiz  Surgery Date: 8/23/2022  Time of Surgery:   Where patient plans to recover: At home with family  Fax number for surgical facility: [facility]    Type of Anesthesia Anticipated: General    Assessment & Plan     The proposed surgical procedure is considered INTERMEDIATE risk.    Pre-op exam: Completed today. EKG showed a normal sinus rhythm, normal EKG. Urine as below. Hold NSAIDS 7 days prior to surgery as well as vitamins. COVID testing per surgeon. Follow up if having new symptoms.   - UA Macro with Reflex to Micro and Culture - lab collect  - EKG 12-lead, tracing only    Rectal bleeding/Polyp of ascending colon, unspecified type: Hx of rectal bleeding which prompted her colonoscopy. She had multiple polyps removed during her colonoscopy, to have one large one 36-40 mm in size removed operatively. Rectal bleeding has subsided.     Painful urination: Continued waxing and waning urinary burning, discomfort. Initial break down of urine showed likely contamination, but sample grew out e.coli sensitive to Macrobid, treated with an extended course of this. Follow up if symptoms persist or worsen.   - UA Macro with Reflex to Micro and Culture - lab collect  - Urine Microscopic Exam  - Urine Culture    Bilateral hearing loss, unspecified hearing loss type: Known bilateral hearing loss, wears hearing aids. Stable.     Essential hypertension: Blood pressure today in office remained elevated at 148/82. She had side effects from Norvasc, could not tolerate Losartan, will try Lisinopril. She also continues on Atenolol.  Continue home blood pressures, follow up in 6 weeks.   - lisinopril (ZESTRIL) 20 MG tablet  Dispense: 90 tablet; Refill: 0    Anxiety: Continued anxiety. She tried and did not like the side effects from Cymbalta. Will obtain gene sight testing. She currently continues on sertraline and on Lorazepam. Stable.   - GeneSight Psychotropic    Moderate episode of recurrent major depressive disorder (H): she continues on Sertraline. Stable.   - GeneSight Psychotropic    Acquired hypothyroidism: She continues on levothyroxine, stable.     Hyperlipidemia LDL goal <130: She is currently on a statin vacation. Has Atorvastatin ordered. Stable.     Gastroesophageal reflux disease with esophagitis without hemorrhage: hx of this, continues on Omeprazole. Stable.     Overweight (BMI 25.0-29.9): She continues to work on diet and exercise.     Risks and Recommendations:  The patient has the following additional risks and recommendations for perioperative complications:   - No identified additional risk factors other than previously addressed    Medication Instructions:  No NSAIDS 7 days prior to surgery. No Vitamins 7 days prior to surgery.    RECOMMENDATION:  APPROVAL GIVEN to proceed with proposed procedure, without further diagnostic evaluation.    Subjective     HPI related to upcoming procedure: The patient presents today for a preoperative examination.    She will be having a large flat colon polyp removed from the ascending colon, measuring 36-40 mm.     She is due for a follow up colonoscopy in one year.    She reports that her previous rectal bleeding has resolved, she denies any other bowel concerns today.    She reports that she continues to have some burning when she urinates. She denies any increased urgency or frequency. No flank pain, nausea or vomiting. Will check another urine today.    She reports that she started the cymbalta and the norvasc at the same time, and she did stop both medications due to side effects of  increased joint and muscle pain. She would like to have gene sight testing done, will order this for mental health medications.    She reports that her home blood pressures continue to be elevated, running above goal, around 145-160/96. We will add in lisinopril today to help control her blood pressures.     Preop Questions 8/1/2022   1. Have you ever had a heart attack or stroke? No   2. Have you ever had surgery on your heart or blood vessels, such as a stent placement, a coronary artery bypass, or surgery on an artery in your head, neck, heart, or legs? No   3. Do you have chest pain with activity? No   4. Do you have a history of  heart failure? No   5. Do you currently have a cold, bronchitis or symptoms of other infection? No   6. Do you have a cough, shortness of breath, or wheezing? No   7. Do you or anyone in your family have previous history of blood clots? No   8. Do you or does anyone in your family have a serious bleeding problem such as prolonged bleeding following surgeries or cuts? No   9. Have you ever had problems with anemia or been told to take iron pills? YES - hx in the past.   10. Have you had any abnormal blood loss such as black, tarry or bloody stools, or abnormal vaginal bleeding? YES - Rectal bleeding from hemorrhoids in the past, has resolved.    11. Have you ever had a blood transfusion? No   12. Are you willing to have a blood transfusion if it is medically needed before, during, or after your surgery? Yes   13. Have you or any of your relatives ever had problems with anesthesia? No   14. Do you have sleep apnea, excessive snoring or daytime drowsiness? No   15. Do you have any artifical heart valves or other implanted medical devices like a pacemaker, defibrillator, or continuous glucose monitor? No   16. Do you have artificial joints? No   17. Are you allergic to latex? No   18. Is there any chance that you may be pregnant? No       Health Care Directive:  Patient does not have a  Health Care Directive or Living Will: Full Code    Preoperative Review of :   reviewed - controlled substances reflected in medication list.      Review of Systems  Constitutional, neuro, ENT, endocrine, pulmonary, cardiac, gastrointestinal, genitourinary, musculoskeletal, integument and psychiatric systems are negative, except as otherwise noted.    Patient Active Problem List    Diagnosis Date Noted     Acute lower GI bleeding 06/21/2022     Priority: Medium     Hiatal hernia 05/16/2022     Priority: Medium     Formatting of this note might be different from the original.  Created by Conversion    Replacement Utility updated for latest IMO load       Postmenopausal bleeding 05/16/2022     Priority: Medium     Hypertension      Priority: Medium     Created by Conversion  Replacement Utility updated for latest IMO load         KVNG (generalized anxiety disorder) 01/24/2017     Priority: Medium     Formatting of this note is different from the original.  Psychiatry to Primary Care Hand-over  Psychiatrist: Matthew Gan MD   PCP: [facility]  Last Visit with Psychiatry:  1/24/2017  Current Medication/s and dose/s: Zoloft 200 QAM, Hydroxyzine 10-20 QHS prn, Ativan 0.5 QD prn (VERY RARELY USES)  Medications tried and failed/Reason(s) for discontinuation: Remeron (daytime sedation)  Recommendations/Plan of care: Continue present medications + therapy (prn with Joyce MUSTAFA)  Recommended Labs Monitoring and Frequency: None required  Last PHQ-9: 0  PHQ-9 1/24/2017 1/14/2016 8/5/2015 7/14/2015 3/18/2015 1/20/2015 12/1/2014   PHQ9 Score - Smartform 0 0 0 0 0 1 0       Arthropathy Of Multiple Sites      Priority: Medium     Created by Conversion  Replacement Utility updated for latest IMO load         Hypothyroidism      Priority: Medium     Created by Conversion  Replacement Utility updated for latest IMO load         Arthropathy Of The Ankle / Foot      Priority: Medium     Created by  Conversion  Replacement Utility updated for latest IMO load         Anxiety      Priority: Medium     Created by Conversion  Replacement Utility updated for latest IMO load         Hiatal Hernia      Priority: Medium     Created by Conversion  Replacement Utility updated for latest IMO load         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     Priority: Medium     Esophageal Reflux      Priority: Medium     Created by Conversion         Dizziness      Priority: Medium     Created by Conversion         Nontoxic Solitary Thyroid Nodule      Priority: Medium     Created by Conversion         Iron Deficiency      Priority: Medium     Created by Conversion         Fatigue      Priority: Medium     Created by Conversion         Midback Pain      Priority: Medium     Created by Conversion         Nephrolithiasis      Priority: Medium     Created by Conversion          Past Medical History:   Diagnosis Date     Anemia      Anxiety      Bladder infection      Bronchitis      Depression      Disease of thyroid gland      Endometriosis      GERD (gastroesophageal reflux disease)      Hiatal hernia      Hip pain     Right>Left     HL (hearing loss)      Hypertension      Kidney stone      Motion sickness      Past Surgical History:   Procedure Laterality Date     APPENDECTOMY       ARTHRODESIS TOE(S) Left      COMBINED CYSTOSCOPY, INSERT STENT URETER(S) Left 11/11/2016    Procedure: CYSTOSCOPY, LEFT URETEROSCOPY, STENT INSERTION, STONE EXTRACTION;  Surgeon: Patric Isabel MD;  Location: [facility];  Service:      CYSTOSCOPY W/ URETEROSCOPY       DILATION AND CURETTAGE, OPERATIVE HYSTEROSCOPY, COMBINED N/A 10/7/2021    Procedure: HYSTEROSCOPY, WITH DILATION AND CURETTAGE;  Surgeon: Norma Romero DO;  Location: [facility]     LAPAROSCOPY DIAGNOSTIC (GENERAL)      endometriosis     URETEROSCOPY       Current Outpatient Medications   Medication Sig Dispense Refill     atenolol (TENORMIN) 50 MG tablet Take 1 tablet (50  mg) by mouth daily 90 tablet 3     calcium carbonate-vitamin D3 (CALCIUM 600 WITH VITAMIN D3) 600 mg(1,500mg) -400 unit cap        hydrOXYzine (ATARAX) 10 MG tablet TAKE 1 TO 1 AND 1/2 TABLETS BY MOUTH AT BEDTIME AS NEEDED. OVERDUE FOR OFFICE VISIT 30 tablet 4     ibuprofen (ADVIL/MOTRIN) 800 MG tablet Take 1 tablet (800 mg) by mouth every 6 hours as needed for other (mild and/or inflammatory pain) 30 tablet 0     levothyroxine (SYNTHROID/LEVOTHROID) 50 MCG tablet Take 1 tablet (50 mcg) by mouth daily 90 tablet 3     lisinopril (ZESTRIL) 20 MG tablet Take 1 tablet (20 mg) by mouth daily 90 tablet 0     LORazepam (ATIVAN) 0.5 MG tablet TAKE 1 TABLET(0.5 MG) BY MOUTH THREE TIMES DAILY AS NEEDED FOR ANXIETY 30 tablet 0     omeprazole (PRILOSEC OTC) 20 MG EC tablet Take 20 mg by mouth daily       sertraline (ZOLOFT) 100 MG tablet TAKE 2 TABLETS(200 MG) BY MOUTH DAILY 180 tablet 3     triamcinolone (KENALOG) 0.5 % ointment [TRIAMCINOLONE (KENALOG) 0.5 % OINTMENT] Apply topically 2 (two) times a day. 30 g 1     valACYclovir (VALTREX) 1000 mg tablet Take 1 tablet (1,000 mg) by mouth 2 times daily 14 tablet 1     atorvastatin (LIPITOR) 40 MG tablet Take 1 tablet (40 mg) by mouth daily (Patient not taking: Reported on 8/1/2022) 90 tablet 3     nitroFURantoin macrocrystal-monohydrate (MACROBID) 100 MG capsule Take 1 capsule (100 mg) by mouth 2 times daily 14 capsule 0       Allergies   Allergen Reactions     Erythromycin Base [Erythromycin] Anaphylaxis and Swelling     Sores in mouth also.     Losartan Other (See Comments)     General malaize     Sulfa (Sulfonamide Antibiotics) [Sulfa Drugs] Rash        Social History     Tobacco Use     Smoking status: Current Some Day Smoker     Types: Cigarettes     Smokeless tobacco: Former User     Tobacco comment: Casual smoker by hx   Substance Use Topics     Alcohol use: No     Family History   Problem Relation Age of Onset     Hypertension Mother      Breast Cancer Mother 73.00      "Heart Disease Father      Hypertension Father      Aneurysm Sister      Hypertension Brother      Depression Brother      Hypertension Sister      Depression Sister      Hypertension Maternal Grandfather      Gout Paternal Grandmother      Hypertension Paternal Grandmother      Urolithiasis Paternal Grandfather         RECURRENT     Heart Disease Paternal Grandfather      Hypertension Paternal Grandfather      History   Drug Use No         Objective     BP (!) 148/82   Pulse 68   Ht 1.664 m (5' 5.5\")   Wt 83 kg (183 lb)   SpO2 98%   BMI 29.99 kg/m      Physical Exam    GENERAL APPEARANCE: healthy, alert and no distress     EYES: EOMI, PERRL     HENT: ear canals and TM's normal and nose and mouth without ulcers or lesions     NECK: no adenopathy, no asymmetry, masses, or scars and thyroid normal to palpation     RESP: lungs clear to auscultation - no rales, rhonchi or wheezes     CV: regular rates and rhythm, normal S1 S2, no S3 or S4 and no murmur, click or rub     ABDOMEN:  soft, nontender, no HSM or masses and bowel sounds normal     MS: extremities normal- no gross deformities noted, no evidence of inflammation in joints, FROM in all extremities.     SKIN: no suspicious lesions or rashes     NEURO: Normal strength and tone, sensory exam grossly normal, mentation intact and speech normal     PSYCH: mentation appears normal. and affect normal/bright     LYMPHATICS: No cervical adenopathy    Recent Labs   Lab Test 06/27/22  1025 06/21/22  1019   HGB 14.8 15.2    196     --    POTASSIUM 3.8  --    CR 0.78  --         Diagnostics:  Recent Results (from the past 168 hour(s))   EKG 12-lead, tracing only    Collection Time: 08/01/22  4:27 PM   Result Value Ref Range    Systolic Blood Pressure  mmHg    Diastolic Blood Pressure  mmHg    Ventricular Rate 62 BPM    Atrial Rate 62 BPM    AK Interval 146 ms    QRS Duration 78 ms     ms    QTc 408 ms    P Axis 41 degrees    R AXIS 23 degrees    T Axis 25 " degrees    Interpretation ECG       Sinus rhythm  Normal ECG  When compared with ECG of 25-FEB-2019 18:30,  No significant change was found  Confirmed by JUNIE OROZCO MD LOC:[facility] ([id number]) on 8/2/2022 1:14:54 PM     UA Macro with Reflex to Micro and Culture - lab collect    Collection Time: 08/01/22  4:29 PM    Specimen: Urine, Clean Catch   Result Value Ref Range    Color Urine Yellow Colorless, Straw, Light Yellow, Yellow    Appearance Urine Clear Clear    Glucose Urine Negative Negative mg/dL    Bilirubin Urine Negative Negative    Ketones Urine Negative Negative mg/dL    Specific Gravity Urine 1.025 1.005 - 1.030    Blood Urine Negative Negative    pH Urine 5.5 5.0 - 8.0    Protein Albumin Urine Negative Negative mg/dL    Urobilinogen Urine 0.2 0.2, 1.0 E.U./dL    Nitrite Urine Positive (A) Negative    Leukocyte Esterase Urine Negative Negative   Urine Microscopic Exam    Collection Time: 08/01/22  4:29 PM   Result Value Ref Range    Bacteria Urine Moderate (A) None Seen /HPF    RBC Urine 0-2 0-2 /HPF /HPF    WBC Urine 0-5 0-5 /HPF /HPF    Squamous Epithelials Urine Few (A) None Seen /LPF    Mucus Urine Present (A) None Seen /LPF   Urine Culture    Collection Time: 08/01/22  4:29 PM    Specimen: Urine, Clean Catch   Result Value Ref Range    Culture 50,000-100,000 CFU/mL Escherichia coli (A)        Susceptibility    Escherichia coli - DANTE     Ampicillin <=2 Susceptible ug/mL     Ampicillin/ Sulbactam <=2 Susceptible ug/mL     Piperacillin/Tazobactam <=4 Susceptible ug/mL     Cefazolin* <=4 Susceptible ug/mL      * Cefazolin DANTE breakpoints are for the treatment of uncomplicated urinary tract infections. For the treatment of systemic infections, please contact the laboratory for additional testing.     Cefoxitin <=4 Susceptible ug/mL     Ceftazidime <=1 Susceptible ug/mL     Ceftriaxone <=1 Susceptible ug/mL     Cefepime <=1 Susceptible ug/mL     Gentamicin <=1 Susceptible ug/mL     Tobramycin <=1 Susceptible ug/mL      Ciprofloxacin <=0.25 Susceptible ug/mL     Levofloxacin <=0.12 Susceptible ug/mL     Nitrofurantoin <=16 Susceptible ug/mL     Trimethoprim/Sulfamethoxazole <=1/19 Susceptible ug/mL      EKG: appears normal, NSR, normal axis, normal intervals, no acute ST/T changes c/w ischemia, unchanged from previous tracings    Revised Cardiac Risk Index (RCRI):  The patient has the following serious cardiovascular risks for perioperative complications:   - No serious cardiac risks = 0 points     RCRI Interpretation: 0 points: Class I (very low risk - 0.4% complication rate)           Signed Electronically by: Larisa Larson CNP  Copy of this evaluation report is provided to requesting physician.

## 2022-08-02 LAB
ATRIAL RATE - MUSE: 62 BPM
DIASTOLIC BLOOD PRESSURE - MUSE: NORMAL MMHG
INTERPRETATION ECG - MUSE: NORMAL
P AXIS - MUSE: 41 DEGREES
PR INTERVAL - MUSE: 146 MS
QRS DURATION - MUSE: 78 MS
QT - MUSE: 402 MS
QTC - MUSE: 408 MS
R AXIS - MUSE: 23 DEGREES
SYSTOLIC BLOOD PRESSURE - MUSE: NORMAL MMHG
T AXIS - MUSE: 25 DEGREES
VENTRICULAR RATE- MUSE: 62 BPM

## 2022-08-03 ENCOUNTER — MYC MEDICAL ADVICE (OUTPATIENT)
Dept: INTERNAL MEDICINE | Facility: CLINIC | Age: 62
End: 2022-08-03

## 2022-08-03 DIAGNOSIS — N30.00 ACUTE CYSTITIS WITHOUT HEMATURIA: Primary | ICD-10-CM

## 2022-08-03 LAB — BACTERIA UR CULT: ABNORMAL

## 2022-08-03 RX ORDER — NITROFURANTOIN 25; 75 MG/1; MG/1
100 CAPSULE ORAL 2 TIMES DAILY
Qty: 14 CAPSULE | Refills: 0 | Status: SHIPPED | OUTPATIENT
Start: 2022-08-03 | End: 2022-09-12

## 2022-08-03 NOTE — TELEPHONE ENCOUNTER
Larisa,  Please see MyChart message from patient needing provider direction.    Please respond directly to patient if appropriate.    No Result Notes regarding this, must have been a provider to patient call? No new prescription on file. Please advise.    SHARON MurphyN, RN  [facility]

## 2022-08-03 NOTE — TELEPHONE ENCOUNTER
With culture results of urine, will place on Macrobid. My chart message sent back to the patient.

## 2022-08-10 ENCOUNTER — MYC MEDICAL ADVICE (OUTPATIENT)
Dept: INTERNAL MEDICINE | Facility: CLINIC | Age: 62
End: 2022-08-10

## 2022-08-15 ENCOUNTER — E-VISIT (OUTPATIENT)
Dept: INTERNAL MEDICINE | Facility: CLINIC | Age: 62
End: 2022-08-15
Payer: COMMERCIAL

## 2022-08-15 ENCOUNTER — TRANSFERRED RECORDS (OUTPATIENT)
Dept: HEALTH INFORMATION MANAGEMENT | Facility: CLINIC | Age: 62
End: 2022-08-15

## 2022-08-15 DIAGNOSIS — L29.9 ITCHING: Primary | ICD-10-CM

## 2022-08-15 PROCEDURE — 99421 OL DIG E/M SVC 5-10 MIN: CPT | Performed by: NURSE PRACTITIONER

## 2022-08-15 NOTE — PATIENT INSTRUCTIONS
Thank you for choosing us for your care. I have placed an order for a prescription so that you can start treatment. View your full visit summary for details by clicking on the link below. Your pharmacist will able to address any questions you may have about the medication.     If you're not feeling better within 5-7 days, please schedule an appointment.  You can schedule an appointment right here in [facility], or call [phone]  If the visit is for the same symptoms as your eVisit, we'll refund the cost of your eVisit if seen within seven days.

## 2022-08-24 ENCOUNTER — VIRTUAL VISIT (OUTPATIENT)
Dept: INTERNAL MEDICINE | Facility: CLINIC | Age: 62
End: 2022-08-24
Payer: COMMERCIAL

## 2022-08-24 DIAGNOSIS — N34.2 URETHRITIS: Primary | ICD-10-CM

## 2022-08-24 PROCEDURE — 99213 OFFICE O/P EST LOW 20 MIN: CPT | Mod: GT | Performed by: NURSE PRACTITIONER

## 2022-08-24 RX ORDER — METRONIDAZOLE 500 MG/1
500 TABLET ORAL 2 TIMES DAILY
Qty: 14 TABLET | Refills: 0 | Status: SHIPPED | OUTPATIENT
Start: 2022-08-24 | End: 2022-08-31

## 2022-08-24 NOTE — PROGRESS NOTES
Jonn is a 62 year old who is being evaluated via a billable video visit.      How would you like to obtain your AVS? MyChart  If the video visit is dropped, the invitation should be resent by: Text to cell phone: [phone]  Will anyone else be joining your video visit? No          Assessment & Plan     Urethritis: Probable bacterial vaginosis, given treatment twice with Macrobid and recurrent burning on urination. Will treat with Flagyl. Rest, push fluids. Follow up if symptoms persist or worsen.   - metroNIDAZOLE (FLAGYL) 500 MG tablet  Dispense: 14 tablet; Refill: 0    Return in about 3 weeks (around 9/14/2022).    Larisa Larson CNP  [facility]   Jonn is a 62 year old presenting for the following health issues:  Follow Up (Uti medication not working )      HPI     The patient presents today with concerns of continued burning with urination.    She has been treated twice for a UTI, once on 08/01; and again on 06/23/22 with Macrobid for an E. Coli infection. Macrobid was sensitive both times.    She reports that at first with this last treatment her symptoms got much better and went away, but the returned again post treatment.    Suspect she may be having bacterial vaginosis with urethritis. She denies any fever, chills, nausea, vomiting, flank pain, or increased urgency or frequency of urination.    She reports having her very large colon polyp removed, this went well.    Will discuss her gene sight testing with her at follow up.  Review of Systems   Constitutional, HEENT, cardiovascular, pulmonary, GI, , musculoskeletal, neuro, skin, endocrine and psych systems are negative, except as otherwise noted.      Objective           Vitals:  No vitals were obtained today due to virtual visit.    Physical Exam   GENERAL: Healthy, alert and no distress  EYES: Eyes grossly normal to inspection.  No discharge or erythema, or obvious scleral/conjunctival  abnormalities.  RESP: No audible wheeze, cough, or visible cyanosis.  No visible retractions or increased work of breathing.    SKIN: Visible skin clear. No significant rash, abnormal pigmentation or lesions.  NEURO: Cranial nerves grossly intact.  Mentation and speech appropriate for age.  PSYCH: Mentation appears normal, affect normal/bright, judgement and insight intact, normal speech and appearance well-groomed.    Video-Visit Details    Video Start Time: 505pm    Type of service:  Video Visit    Video End Time: 525pm    Originating Location (pt. Location): Home    Distant Location (provider location):  [facility]     Platform used for Video Visit: Dazo    .  ..

## 2022-09-11 DIAGNOSIS — F41.9 ANXIETY: ICD-10-CM

## 2022-09-12 ENCOUNTER — OFFICE VISIT (OUTPATIENT)
Dept: INTERNAL MEDICINE | Facility: CLINIC | Age: 62
End: 2022-09-12
Payer: COMMERCIAL

## 2022-09-12 VITALS
SYSTOLIC BLOOD PRESSURE: 130 MMHG | HEART RATE: 64 BPM | WEIGHT: 179 LBS | BODY MASS INDEX: 29.33 KG/M2 | OXYGEN SATURATION: 98 % | DIASTOLIC BLOOD PRESSURE: 88 MMHG

## 2022-09-12 DIAGNOSIS — I10 ESSENTIAL HYPERTENSION: ICD-10-CM

## 2022-09-12 DIAGNOSIS — F33.1 MODERATE EPISODE OF RECURRENT MAJOR DEPRESSIVE DISORDER (H): ICD-10-CM

## 2022-09-12 DIAGNOSIS — F41.9 ANXIETY: ICD-10-CM

## 2022-09-12 DIAGNOSIS — M85.89 OSTEOPENIA OF MULTIPLE SITES: ICD-10-CM

## 2022-09-12 DIAGNOSIS — N34.2 URETHRITIS: ICD-10-CM

## 2022-09-12 PROBLEM — D12.6 ADENOMA OF LARGE INTESTINE: Status: ACTIVE | Noted: 2022-09-12

## 2022-09-12 PROBLEM — K64.9 HEMORRHOIDS: Status: ACTIVE | Noted: 2022-06-28

## 2022-09-12 PROBLEM — D12.4 BENIGN NEOPLASM OF DESCENDING COLON: Status: ACTIVE | Noted: 2022-06-30

## 2022-09-12 PROBLEM — K62.5 HEMORRHAGE OF RECTUM AND ANUS: Status: ACTIVE | Noted: 2022-06-30

## 2022-09-12 PROBLEM — D12.3 BENIGN NEOPLASM OF TRANSVERSE COLON: Status: ACTIVE | Noted: 2022-06-30

## 2022-09-12 PROCEDURE — 99213 OFFICE O/P EST LOW 20 MIN: CPT | Performed by: NURSE PRACTITIONER

## 2022-09-12 RX ORDER — LORAZEPAM 0.5 MG/1
TABLET ORAL
Qty: 30 TABLET | Refills: 0 | Status: SHIPPED | OUTPATIENT
Start: 2022-09-12 | End: 2023-01-10

## 2022-09-12 ASSESSMENT — ANXIETY QUESTIONNAIRES
3. WORRYING TOO MUCH ABOUT DIFFERENT THINGS: NOT AT ALL
5. BEING SO RESTLESS THAT IT IS HARD TO SIT STILL: NOT AT ALL
1. FEELING NERVOUS, ANXIOUS, OR ON EDGE: SEVERAL DAYS
GAD7 TOTAL SCORE: 3
GAD7 TOTAL SCORE: 3
6. BECOMING EASILY ANNOYED OR IRRITABLE: SEVERAL DAYS
7. FEELING AFRAID AS IF SOMETHING AWFUL MIGHT HAPPEN: SEVERAL DAYS
2. NOT BEING ABLE TO STOP OR CONTROL WORRYING: NOT AT ALL
IF YOU CHECKED OFF ANY PROBLEMS ON THIS QUESTIONNAIRE, HOW DIFFICULT HAVE THESE PROBLEMS MADE IT FOR YOU TO DO YOUR WORK, TAKE CARE OF THINGS AT HOME, OR GET ALONG WITH OTHER PEOPLE: NOT DIFFICULT AT ALL

## 2022-09-12 ASSESSMENT — PATIENT HEALTH QUESTIONNAIRE - PHQ9
SUM OF ALL RESPONSES TO PHQ QUESTIONS 1-9: 0
5. POOR APPETITE OR OVEREATING: NOT AT ALL

## 2022-09-12 NOTE — PROGRESS NOTES
Assessment & Plan     Essential hypertension: Blood pressure today was normal at 130/88. Off medication. She will continue monitoring this at home, and update provider if above goal (140/90).    Anxiety: She continues on Sertraline, PRN Lorazepam. Stable.     Moderate episode of recurrent major depressive disorder (H): She continues on sertraline. Stable.     Urethritis: has resolved after Flagyl, likely was related to bacterial vaginosis inflaming her urethra. Stable.    Osteopenia of multiple sites: Hx of this, will check a bone density test.   - DX Hip/Pelvis/Spine      Return in about 6 months (around 3/12/2023) for Follow up.    Larisa Larson CNP  [facility]    Melinda Lunsford is a 62 year old presenting for the following health issues:  Follow Up (Stopped Lisinopril. )      History of Present Illness       Back Pain:  She presents for follow up of back pain. Patient's back pain is a new problem.    Original cause of back pain: not sure  First noticed back pain: more than 1 month ago  Patient feels back pain: dailyLocation of back pain:  Right lower back and left lower back  Description of back pain: dull ache  Back pain spreads: right buttocks and left buttocks    Since patient first noticed back pain, pain is: always present, but gets better and worse  Does back pain interfere with her job:  No  On a scale of 1-10 (10 being the worst), patient describes pain as:  6  What makes back pain worse: sitting  Heat: helpful  Muscle relaxants: not tried  NSAIDS: helpful  Other healthcare providers patient is seeing for back pain: None    Hypertension: She presents for follow up of hypertension.  She does not check blood pressure  regularly outside of the clinic. Outpatient blood pressures have not been over 140/90. She does not follow a low salt diet.       The patient reports having worsening back pain and cough when she was on the lisinopril. She stopped this medication  and her symptoms resolved somewhat.     Discussed that the cough is likely related to the lisinopril, the back pain not so much.     Discussed her previous imaging results from 2018 showing arthritis in her back.    She sees [facility] for her other issues, currently for a left knee Baker's cyst, she will follow up with them about her back pain.    She reports belt line low back pain, with radiation into her legs and hips.    Discussed her gene sight testing at length with her today. She is on sertraline which per her testing is not the best option for depression and anxiety for her, discussed possibly switching over to Wellbutrin. She is on PRN lorazepam taking 0.5-1 tablet or so a week. PRN Hydroxyzine use is sparingly as well.    She reports that overall mental health wise, she is doing okay and does not want to switch medications at this time.    Her bladder symptoms have resolved.     Review of Systems   Constitutional, HEENT, cardiovascular, pulmonary, GI, , musculoskeletal, neuro, skin, endocrine and psych systems are negative, except as otherwise noted.      Objective    /88   Pulse 64   Wt 81.2 kg (179 lb)   SpO2 98%   BMI 29.33 kg/m    Body mass index is 29.33 kg/m .  Physical Exam   GENERAL: healthy, alert and no distress  EYES: Eyes grossly normal to inspection  RESP: Equal chest rise and fall, able to talk in full sentences without shortness of breath.   MS: no gross musculoskeletal defects noted, no edema, walks without difficulty.   SKIN: no suspicious lesions  PSYCH: mentation appears normal, affect normal/bright

## 2022-09-12 NOTE — PATIENT INSTRUCTIONS
Think about your gene sight testing, please let me know if you have question about this.    Continue watching your blood pressure at home, if running over 140/90; please let me know.    Return to exercise slowly. Try non-weight bearing exercise.    Talk to [facility] about your back and leg pain.    I will see you back in 6 months for follow up before then if anything comes up.    To schedule your bone density testing call [phone].

## 2022-09-16 DIAGNOSIS — Z98.890 STATUS POST HYSTEROSCOPY: ICD-10-CM

## 2022-09-16 DIAGNOSIS — F51.02 ADJUSTMENT INSOMNIA: ICD-10-CM

## 2022-09-16 RX ORDER — IBUPROFEN 800 MG/1
800 TABLET, FILM COATED ORAL EVERY 6 HOURS PRN
Qty: 30 TABLET | Refills: 0 | Status: SHIPPED | OUTPATIENT
Start: 2022-09-16 | End: 2023-03-06

## 2022-09-16 RX ORDER — HYDROXYZINE HYDROCHLORIDE 10 MG/1
TABLET, FILM COATED ORAL
Qty: 30 TABLET | Refills: 4 | Status: SHIPPED | OUTPATIENT
Start: 2022-09-16 | End: 2023-03-29

## 2022-09-16 NOTE — TELEPHONE ENCOUNTER
9-16-22    Medication Question or Refill        What medication are you calling about (include dose and sig)?:   hydrOXYzine (ATARAX) 10 MG tablet  &   ibuprofen (ADVIL/MOTRIN) 800 MG tablet    Controlled Substance Agreement on file:   CSA -- Patient Level:    CSA: None found at the patient level.       Who prescribed the medication?: ibuprofen -takes as needed by Dr Joyce New  & hydroxyzine-takes daily by Dr padgett      Do you need a refill? Yes:     When did you use the medication last? daily    Patient offered an appointment? No    Do you have any questions or concerns?  No    Preferred Pharmacy:   [id number] - [place], [place] - [address] [place] [address]  Phone: [phone] Fax: [phone]      Could we send this information to you in [email or web address] or would you prefer to receive a phone call?:   Patient would prefer a phone call   Okay to leave a detailed message?: Yes at Cell number on file:    Telephone Information:   Mobile [phone]

## 2022-09-16 NOTE — TELEPHONE ENCOUNTER
Routing refill request to provider for review/approval because:  Drug not on the [facility] refill protocol     Last OV: 9/12/22.    Requested Prescriptions   Pending Prescriptions Disp Refills     ibuprofen (ADVIL/MOTRIN) 800 MG tablet 30 tablet 0     Sig: Take 1 tablet (800 mg) by mouth every 6 hours as needed for other (mild and/or inflammatory pain)       There is no refill protocol information for this order        hydrOXYzine (ATARAX) 10 MG tablet 30 tablet 4     Sig: TAKE 1 TO 1 AND 1/2 TABLETS BY MOUTH AT BEDTIME AS NEEDED. OVERDUE FOR OFFICE VISIT       There is no refill protocol information for this order        Gita Freeman RN, BSN  [facility]

## 2022-10-02 ENCOUNTER — HEALTH MAINTENANCE LETTER (OUTPATIENT)
Age: 62
End: 2022-10-02

## 2022-11-20 ENCOUNTER — TRANSFERRED RECORDS (OUTPATIENT)
Dept: HEALTH INFORMATION MANAGEMENT | Facility: CLINIC | Age: 62
End: 2022-11-20

## 2022-11-23 ENCOUNTER — HOSPITAL ENCOUNTER (OUTPATIENT)
Dept: BONE DENSITY | Facility: HOSPITAL | Age: 62
Discharge: HOME | End: 2022-11-23
Attending: NURSE PRACTITIONER | Admitting: NURSE PRACTITIONER
Payer: COMMERCIAL

## 2022-11-23 DIAGNOSIS — M85.89 OSTEOPENIA OF MULTIPLE SITES: ICD-10-CM

## 2022-11-23 PROCEDURE — 77080 DXA BONE DENSITY AXIAL: CPT

## 2022-11-29 ENCOUNTER — E-VISIT (OUTPATIENT)
Dept: INTERNAL MEDICINE | Facility: CLINIC | Age: 62
End: 2022-11-29
Payer: COMMERCIAL

## 2022-11-29 DIAGNOSIS — J06.9 VIRAL UPPER RESPIRATORY TRACT INFECTION: Primary | ICD-10-CM

## 2022-11-29 PROCEDURE — 99421 OL DIG E/M SVC 5-10 MIN: CPT | Performed by: INTERNAL MEDICINE

## 2022-11-29 NOTE — PATIENT INSTRUCTIONS
Thank you for choosing us for your care. Based on your symptoms and length of illness, I do not think that you need a prescription at this time.  Please follow the care advise I've provided and use the over the counter medications to help relieve your symptoms.   View your full visit summary for details by clicking on the link below.     If you're not feeling better within 2-3 days, please respond to this message and we can consider if a prescription is needed.  You can schedule an appointment right here in [employer], or call [phone]  If the visit is for the same symptoms as your eVisit, we'll refund the cost of your eVisit if seen within seven days.      Thank you for choosing us for your care. Based on your symptoms and length of illness, I do not think that you need an antibiotic prescription at this time.  Please follow the care advise I ve provided and use the prescribed medication to help relieve your symptoms. View your full visit summary for details by clicking on the link below.     If you re not feeling better within 5-7 days, please respond to this message and we can consider if an antibiotic prescription is needed.  You can schedule an appointment right here in [employer], or call [phone]  If the visit is for the same symptoms as your eVisit, we ll refund the cost of your eVisit if seen within seven days

## 2022-12-07 ENCOUNTER — MYC MEDICAL ADVICE (OUTPATIENT)
Dept: INTERNAL MEDICINE | Facility: CLINIC | Age: 62
End: 2022-12-07

## 2022-12-07 ENCOUNTER — ANCILLARY PROCEDURE (OUTPATIENT)
Dept: GENERAL RADIOLOGY | Facility: CLINIC | Age: 62
End: 2022-12-07
Attending: NURSE PRACTITIONER
Payer: COMMERCIAL

## 2022-12-07 ENCOUNTER — OFFICE VISIT (OUTPATIENT)
Dept: INTERNAL MEDICINE | Facility: CLINIC | Age: 62
End: 2022-12-07
Payer: COMMERCIAL

## 2022-12-07 VITALS
WEIGHT: 180.7 LBS | HEIGHT: 66 IN | TEMPERATURE: 97.9 F | OXYGEN SATURATION: 97 % | SYSTOLIC BLOOD PRESSURE: 137 MMHG | BODY MASS INDEX: 29.04 KG/M2 | DIASTOLIC BLOOD PRESSURE: 85 MMHG | HEART RATE: 88 BPM | RESPIRATION RATE: 16 BRPM

## 2022-12-07 DIAGNOSIS — R05.1 ACUTE COUGH: ICD-10-CM

## 2022-12-07 DIAGNOSIS — M81.0 AGE-RELATED OSTEOPOROSIS WITHOUT CURRENT PATHOLOGICAL FRACTURE: ICD-10-CM

## 2022-12-07 DIAGNOSIS — E55.9 VITAMIN D DEFICIENCY: ICD-10-CM

## 2022-12-07 DIAGNOSIS — R73.9 HYPERGLYCEMIA: Primary | ICD-10-CM

## 2022-12-07 DIAGNOSIS — J22 LOWER RESPIRATORY INFECTION: ICD-10-CM

## 2022-12-07 LAB
ANION GAP SERPL CALCULATED.3IONS-SCNC: 14 MMOL/L (ref 7–15)
BASOPHILS # BLD AUTO: 0.1 10E3/UL (ref 0–0.2)
BASOPHILS NFR BLD AUTO: 1 %
BUN SERPL-MCNC: 9.8 MG/DL (ref 8–23)
CALCIUM SERPL-MCNC: 8.9 MG/DL (ref 8.8–10.2)
CHLORIDE SERPL-SCNC: 106 MMOL/L (ref 98–107)
CREAT SERPL-MCNC: 0.76 MG/DL (ref 0.51–0.95)
DEPRECATED CALCIDIOL+CALCIFEROL SERPL-MC: 31 UG/L (ref 20–75)
DEPRECATED HCO3 PLAS-SCNC: 22 MMOL/L (ref 22–29)
EOSINOPHIL # BLD AUTO: 0.3 10E3/UL (ref 0–0.7)
EOSINOPHIL NFR BLD AUTO: 3 %
ERYTHROCYTE [DISTWIDTH] IN BLOOD BY AUTOMATED COUNT: 13.2 % (ref 10–15)
GFR SERPL CREATININE-BSD FRML MDRD: 89 ML/MIN/1.73M2
GLUCOSE SERPL-MCNC: 142 MG/DL (ref 70–99)
HCT VFR BLD AUTO: 44.5 % (ref 35–47)
HGB BLD-MCNC: 14.7 G/DL (ref 11.7–15.7)
IMM GRANULOCYTES # BLD: 0 10E3/UL
IMM GRANULOCYTES NFR BLD: 0 %
LYMPHOCYTES # BLD AUTO: 2.2 10E3/UL (ref 0.8–5.3)
LYMPHOCYTES NFR BLD AUTO: 24 %
MCH RBC QN AUTO: 29.8 PG (ref 26.5–33)
MCHC RBC AUTO-ENTMCNC: 33 G/DL (ref 31.5–36.5)
MCV RBC AUTO: 90 FL (ref 78–100)
MONOCYTES # BLD AUTO: 0.6 10E3/UL (ref 0–1.3)
MONOCYTES NFR BLD AUTO: 6 %
NEUTROPHILS # BLD AUTO: 6 10E3/UL (ref 1.6–8.3)
NEUTROPHILS NFR BLD AUTO: 66 %
PLATELET # BLD AUTO: 239 10E3/UL (ref 150–450)
POTASSIUM SERPL-SCNC: 3.9 MMOL/L (ref 3.4–5.3)
RBC # BLD AUTO: 4.94 10E6/UL (ref 3.8–5.2)
SODIUM SERPL-SCNC: 142 MMOL/L (ref 136–145)
WBC # BLD AUTO: 9.1 10E3/UL (ref 4–11)

## 2022-12-07 PROCEDURE — 80048 BASIC METABOLIC PNL TOTAL CA: CPT | Performed by: NURSE PRACTITIONER

## 2022-12-07 PROCEDURE — 85025 COMPLETE CBC W/AUTO DIFF WBC: CPT | Performed by: NURSE PRACTITIONER

## 2022-12-07 PROCEDURE — 82306 VITAMIN D 25 HYDROXY: CPT | Performed by: NURSE PRACTITIONER

## 2022-12-07 PROCEDURE — 99214 OFFICE O/P EST MOD 30 MIN: CPT | Performed by: NURSE PRACTITIONER

## 2022-12-07 PROCEDURE — 83036 HEMOGLOBIN GLYCOSYLATED A1C: CPT | Performed by: NURSE PRACTITIONER

## 2022-12-07 PROCEDURE — 36415 COLL VENOUS BLD VENIPUNCTURE: CPT | Performed by: NURSE PRACTITIONER

## 2022-12-07 PROCEDURE — 71046 X-RAY EXAM CHEST 2 VIEWS: CPT | Mod: TC | Performed by: RADIOLOGY

## 2022-12-07 RX ORDER — ALBUTEROL SULFATE 90 UG/1
2 AEROSOL, METERED RESPIRATORY (INHALATION) EVERY 6 HOURS PRN
Qty: 18 G | Refills: 3 | Status: SHIPPED | OUTPATIENT
Start: 2022-12-07 | End: 2023-03-06

## 2022-12-07 RX ORDER — METHYLPREDNISOLONE 4 MG
TABLET, DOSE PACK ORAL
Qty: 21 TABLET | Refills: 0 | Status: SHIPPED | OUTPATIENT
Start: 2022-12-07 | End: 2023-03-06

## 2022-12-07 RX ORDER — BENZONATATE 200 MG/1
200 CAPSULE ORAL 3 TIMES DAILY PRN
Qty: 30 CAPSULE | Refills: 1 | Status: SHIPPED | OUTPATIENT
Start: 2022-12-07 | End: 2023-03-06

## 2022-12-07 RX ORDER — DOXYCYCLINE 100 MG/1
100 CAPSULE ORAL 2 TIMES DAILY
Qty: 20 CAPSULE | Refills: 0 | Status: SHIPPED | OUTPATIENT
Start: 2022-12-07 | End: 2023-03-06

## 2022-12-07 NOTE — PATIENT INSTRUCTIONS
Rest, push fluids.    Doxycycline 1 tablet twice a day for 10 days.  Medrol Dosepak as prescribed.    Tessalon Perles as needed for cough.    Albuterol inhaler 2 puffs, 4 times per day for the next 2 to 3 days to help open your lungs up and get the carotid up and out.    Continue Mucinex twice a day.    Follow-up if symptoms persist or worsen.

## 2022-12-07 NOTE — PROGRESS NOTES
Assessment & Plan     Acute cough/Lower respiratory infection: Started a week ago, right lower lobe crackles, infiltrate seen on chest x-ray. With smoking history, will treat with Doxycycline, Medrol, Albuterol, and tessalon pearls. Rest, push fluids. Follow up if symptoms persist or worsen.   - XR Chest 2 Views  - CBC with platelets and differential  - Basic metabolic panel  (Ca, Cl, CO2, Creat, Gluc, K, Na, BUN)  - doxycycline hyclate (VIBRAMYCIN) 100 MG capsule  Dispense: 20 capsule; Refill: 0  - methylPREDNISolone (MEDROL DOSEPAK) 4 MG tablet therapy pack  Dispense: 21 tablet; Refill: 0  - benzonatate (TESSALON) 200 MG capsule  Dispense: 30 capsule; Refill: 1  - albuterol (PROAIR HFA/PROVENTIL HFA/VENTOLIN HFA) 108 (90 Base) MCG/ACT inhaler  Dispense: 18 g; Refill: 3    Vitamin D deficiency: hx of this, will recheck labs. She is taking Vitamin D 800 units daily.   - Vitamin D Deficiency    Age-related osteoporosis without current pathological fracture: recent bone density test showed worsening osteoporosis. Is on Calcium 1000mg daily, Vitamin D 800 units daily. Is doing weight bearing exercise. Does not want an Endocrinology referral for treatment at this time.       Return in about 3 months (around 3/7/2023) for Follow up.    Larisa Larson CNP  [facility]    Melinda Lunsford is a 62 year old presenting for the following health issues:  Sinus Problem (Stuffy Snotty Congestion Some headaches x2 weeks Covid Neg)      The patient presents today with concerns of congestion, cough, and fatigue. Her symptoms started one week ago. She denies any shortness of breath, she has tried taking Mucinex at home, but this has not helped much.    She reports some chills, no fever.    She is still smoking about a pack of cigarettes per week.     Discussed her worsening bone density test results today in office. Recommended that she see Endocrinology to help treat her Osteoporosis. She is  "not interested in medication for this.    She is taking the recommended calcium and vitamin D on a regular basis. Discussed importance of weight bearing exercise as well.     Sinus Problem     History of Present Illness       Reason for visit:  Sinus  Symptom onset:  1-2 weeks ago  Symptoms include:  Nasal head congestion coughing tired  Symptom intensity:  Moderate  Symptom progression:  Staying the same  Had these symptoms before:  No        Review of Systems   Constitutional, HEENT, cardiovascular, pulmonary, GI, , musculoskeletal, neuro, skin, endocrine and psych systems are negative, except as otherwise noted.      Objective    /85   Pulse 88   Temp 97.9  F (36.6  C) (Oral)   Resp 16   Ht 1.664 m (5' 5.5\")   Wt 82 kg (180 lb 11.2 oz)   SpO2 97%   BMI 29.61 kg/m    Body mass index is 29.61 kg/m .  Physical Exam   GENERAL: healthy, alert and no distress  EYES: Eyes grossly normal to inspection, PERRL and conjunctivae and sclerae normal  HENT: pharynx is red, slightly swollen, turbinates are swollen, red.   NECK: no adenopathy, no asymmetry, masses, or scars and thyroid normal to palpation  RESP: Right lower lobe crackles, clear all other fields, decreased effort.   CV: regular rate and rhythm, normal S1 S2, no S3 or S4, no murmur, click or rub, no peripheral edema and peripheral pulses strong  MS: no gross musculoskeletal defects noted, no edema  SKIN: no suspicious lesions or rashes  NEURO: Normal strength and tone, mentation intact and speech normal  PSYCH: mentation appears normal, affect normal/bright    CXR - Reviewed and interpreted by me Infiltrate seen in the right lower lobe. CBC was normal.         "

## 2022-12-08 NOTE — TELEPHONE ENCOUNTER
Larisa,    Please see pt's response to Lab Result notes from yesterday and advise.    SHARON MurphyN, RN  [facility]

## 2022-12-09 LAB — HBA1C MFR BLD: 6.3 % (ref 0–5.6)

## 2022-12-15 DIAGNOSIS — R69 DIAGNOSIS UNKNOWN: Primary | ICD-10-CM

## 2022-12-19 ENCOUNTER — HOSPITAL ENCOUNTER (OUTPATIENT)
Dept: CT IMAGING | Facility: HOSPITAL | Age: 62
Discharge: HOME | End: 2022-12-19
Attending: PHYSICIAN ASSISTANT | Admitting: PHYSICIAN ASSISTANT
Payer: COMMERCIAL

## 2022-12-19 ENCOUNTER — TELEPHONE (OUTPATIENT)
Dept: UROLOGY | Facility: CLINIC | Age: 62
End: 2022-12-19

## 2022-12-19 DIAGNOSIS — N20.0 CALCULUS OF KIDNEY: ICD-10-CM

## 2022-12-19 DIAGNOSIS — R10.9 FLANK PAIN: ICD-10-CM

## 2022-12-19 PROCEDURE — 74176 CT ABD & PELVIS W/O CONTRAST: CPT

## 2022-12-20 ENCOUNTER — VIRTUAL VISIT (OUTPATIENT)
Dept: UROLOGY | Facility: CLINIC | Age: 62
End: 2022-12-20
Payer: COMMERCIAL

## 2022-12-20 DIAGNOSIS — R69 DIAGNOSIS UNKNOWN: ICD-10-CM

## 2022-12-20 DIAGNOSIS — N20.0 CALCULUS OF KIDNEY: Primary | ICD-10-CM

## 2022-12-20 PROCEDURE — 99202 OFFICE O/P NEW SF 15 MIN: CPT | Mod: GT | Performed by: UROLOGY

## 2022-12-20 ASSESSMENT — PAIN SCALES - GENERAL: PAINLEVEL: MODERATE PAIN (4)

## 2022-12-20 NOTE — PROGRESS NOTES
Patient is roomed via telephone for a virtual visit.  Patient confirmed she is in the [facility] at the time of this appointment.  Patient understand that this visit is billable and agree to proceed with appointment.

## 2022-12-20 NOTE — PROGRESS NOTES
Assessment/Plan:    Assessment & Plan   Jonn was seen today for new patient.    Diagnoses and all orders for this visit:    Calculus of kidney    Diagnosis unknown  -     Adult Urology  Referral        Stone Management Plan  Stone Management 12/20/2022   Urinary Tract Infection No suspicion of infection   Renal Colic Asymptomatic at this time   Renal Failure No suspicion of renal failure   Right sided stones? No   R Stone Event No current event   Left sided stones? No   L Stone Event No current event             PLAN    No current stone    Video call duration: 5 minutes  Distant site (provider site): Remote  10 minutes spent on the date of the encounter doing chart review, history and exam, documentation and further activities per the note    MAGDA RANGEL MD  [facility]    Subjective:     HPI  MsSrinivasa Watson is a 62 year old  female who is being evaluated via a billable video visit by [facility] [place] concern for recurrent stone.    She has been having some vague abdominal pain and is concerned that she may have a recurrent stone.    CT scan is personally reviewed and demonstrates absence of any stone.    She is reassured that this is not a stone issue. Anticipate follow up with PCP. Has had some UTI a few months ago. Should reculture.    ROS   Review of systems is negative except for HPI    Past Medical History:   Diagnosis Date     Anemia      Anxiety      Bladder infection      Bronchitis      Depression      Disease of thyroid gland      Endometriosis      GERD (gastroesophageal reflux disease)      Hiatal hernia      Hip pain     Right>Left     HL (hearing loss)      Hypertension      Kidney stone      Motion sickness        Past Surgical History:   Procedure Laterality Date     APPENDECTOMY       ARTHRODESIS TOE(S) Left      COMBINED CYSTOSCOPY, INSERT STENT URETER(S) Left 11/11/2016    Procedure: CYSTOSCOPY, LEFT  URETEROSCOPY, STENT INSERTION, STONE EXTRACTION;  Surgeon: Patric Isabel MD;  Location: [facility] OR;  Service:      CYSTOSCOPY W/ URETEROSCOPY       DILATION AND CURETTAGE, OPERATIVE HYSTEROSCOPY, COMBINED N/A 10/7/2021    Procedure: HYSTEROSCOPY, WITH DILATION AND CURETTAGE;  Surgeon: Norma Romero DO;  Location: [facility]     LAPAROSCOPY DIAGNOSTIC (GENERAL)      endometriosis     URETEROSCOPY         Current Outpatient Medications   Medication Sig Dispense Refill     albuterol (PROAIR HFA/PROVENTIL HFA/VENTOLIN HFA) 108 (90 Base) MCG/ACT inhaler Inhale 2 puffs into the lungs every 6 hours as needed for shortness of breath / dyspnea or wheezing 18 g 3     atenolol (TENORMIN) 50 MG tablet Take 1 tablet (50 mg) by mouth daily 90 tablet 3     benzonatate (TESSALON) 200 MG capsule Take 1 capsule (200 mg) by mouth 3 times daily as needed for cough 30 capsule 1     calcium carbonate-vitamin D3 (CALCIUM 600 WITH VITAMIN D3) 600 mg(1,500mg) -400 unit cap        diclofenac (VOLTAREN) 1 % topical gel APPLY SPARINGLY TO THE AFFECTED AREA ONCE DAILY.       doxycycline hyclate (VIBRAMYCIN) 100 MG capsule Take 1 capsule (100 mg) by mouth 2 times daily 20 capsule 0     hydrOXYzine (ATARAX) 10 MG tablet TAKE 1 TO 1 AND 1/2 TABLETS BY MOUTH AT BEDTIME AS NEEDED. OVERDUE FOR OFFICE VISIT 30 tablet 4     ibuprofen (ADVIL/MOTRIN) 800 MG tablet Take 1 tablet (800 mg) by mouth every 6 hours as needed for other (mild and/or inflammatory pain) 30 tablet 0     levothyroxine (SYNTHROID/LEVOTHROID) 50 MCG tablet Take 1 tablet (50 mcg) by mouth daily 90 tablet 3     LORazepam (ATIVAN) 0.5 MG tablet TAKE 1 TABLET(0.5 MG) BY MOUTH THREE TIMES DAILY AS NEEDED FOR ANXIETY 30 tablet 0     methylPREDNISolone (MEDROL DOSEPAK) 4 MG tablet therapy pack Follow Package Directions 21 tablet 0     omeprazole (PRILOSEC OTC) 20 MG EC tablet Take 20 mg by mouth daily       sertraline (ZOLOFT) 100 MG tablet TAKE 2 TABLETS(200 MG)  BY MOUTH DAILY 180 tablet 3     triamcinolone (KENALOG) 0.5 % ointment [TRIAMCINOLONE (KENALOG) 0.5 % OINTMENT] Apply topically 2 (two) times a day. 30 g 1     valACYclovir (VALTREX) 1000 mg tablet Take 1 tablet (1,000 mg) by mouth 2 times daily 14 tablet 1       Allergies   Allergen Reactions     Erythromycin Base [Erythromycin] Anaphylaxis and Swelling     Sores in mouth also.     Lisinopril Cough     Losartan Other (See Comments)     General malaize     Sulfa (Sulfonamide Antibiotics) [Sulfa Drugs] Rash       Social History     Socioeconomic History     Marital status: Single     Spouse name: Not on file     Number of children: Not on file     Years of education: Not on file     Highest education level: Not on file   Occupational History     Not on file   Tobacco Use     Smoking status: Some Days     Types: Cigarettes     Smokeless tobacco: Former     Tobacco comments:     Casual smoker by hx   Substance and Sexual Activity     Alcohol use: No     Drug use: No     Sexual activity: Yes     Partners: Female   Other Topics Concern     Not on file   Social History Narrative    Single, homosexual, has partner for years. Smokes cigarettes, off and on. Non alcohol drinker. Works in the [email or web address] and does free [employer].     Social Determinants of Health     Financial Resource Strain: Not on file   Food Insecurity: Not on file   Transportation Needs: Not on file   Physical Activity: Not on file   Stress: Not on file   Social Connections: Not on file   Intimate Partner Violence: Not on file   Housing Stability: Not on file       Family History   Problem Relation Age of Onset     Hypertension Mother      Breast Cancer Mother 73.00     Heart Disease Father      Hypertension Father      Aneurysm Sister      Hypertension Brother      Depression Brother      Hypertension Sister      Depression Sister      Hypertension Maternal Grandfather      Gout Paternal Grandmother      Hypertension Paternal Grandmother      Urolithiasis  Paternal Grandfather         RECURRENT     Heart Disease Paternal Grandfather      Hypertension Paternal Grandfather        Objective:     No vitals or physical exam obtained due to virtual visit    LABS  Most Recent 3 CBC's:Recent Labs   Lab Test 12/07/22  0801 06/27/22  1025 06/21/22  1019   WBC 9.1 7.7 11.9*   HGB 14.7 14.8 15.2   MCV 90 90 91    179 196     Most Recent 3 BMP's:Recent Labs   Lab Test 12/07/22  0801 06/27/22  1025 02/25/19  1851    142 145   POTASSIUM 3.9 3.8 3.8   CHLORIDE 106 108* 107   CO2 22 23 26   BUN 9.8 14 10   CR 0.76 0.78 0.81   ANIONGAP 14 11 12   ELLY 8.9 9.4 10.0   * 107 90     Most Recent Urinalysis:Recent Labs   Lab Test 08/01/22  1629   COLOR Yellow   APPEARANCE Clear   URINEGLC Negative   URINEBILI Negative   URINEKETONE Negative   SG 1.025   UBLD Negative   URINEPH 5.5   PROTEIN Negative   UROBILINOGEN 0.2   NITRITE Positive*   LEUKEST Negative   RBCU 0-2   WBCU 0-5     Acute Labs   Urine Culture    Culture   Date Value Ref Range Status   08/01/2022 50,000-100,000 CFU/mL Escherichia coli (A)  Final   06/23/2022 >100,000 CFU/mL Escherichia coli (A)  Final   06/23/2022 >100,000 CFU/mL Escherichia coli (A)  Final          [Negative] : Heme/Lymph

## 2023-01-03 ENCOUNTER — TRANSFERRED RECORDS (OUTPATIENT)
Dept: HEALTH INFORMATION MANAGEMENT | Facility: CLINIC | Age: 63
End: 2023-01-03

## 2023-01-05 DIAGNOSIS — F41.9 ANXIETY: ICD-10-CM

## 2023-01-10 RX ORDER — LORAZEPAM 0.5 MG/1
TABLET ORAL
Qty: 30 TABLET | Refills: 0 | Status: SHIPPED | OUTPATIENT
Start: 2023-01-10 | End: 2023-02-13

## 2023-02-13 ENCOUNTER — MYC REFILL (OUTPATIENT)
Dept: INTERNAL MEDICINE | Facility: CLINIC | Age: 63
End: 2023-02-13
Payer: COMMERCIAL

## 2023-02-13 DIAGNOSIS — F41.9 ANXIETY: ICD-10-CM

## 2023-02-14 ENCOUNTER — HOSPITAL ENCOUNTER (EMERGENCY)
Facility: CLINIC | Age: 63
Discharge: HOME | End: 2023-02-14
Attending: EMERGENCY MEDICINE | Admitting: EMERGENCY MEDICINE
Payer: OTHER MISCELLANEOUS

## 2023-02-14 ENCOUNTER — APPOINTMENT (OUTPATIENT)
Dept: CT IMAGING | Facility: CLINIC | Age: 63
End: 2023-02-14
Attending: EMERGENCY MEDICINE
Payer: OTHER MISCELLANEOUS

## 2023-02-14 ENCOUNTER — MYC MEDICAL ADVICE (OUTPATIENT)
Dept: INTERNAL MEDICINE | Facility: CLINIC | Age: 63
End: 2023-02-14
Payer: COMMERCIAL

## 2023-02-14 ENCOUNTER — NURSE TRIAGE (OUTPATIENT)
Dept: INTERNAL MEDICINE | Facility: CLINIC | Age: 63
End: 2023-02-14
Payer: COMMERCIAL

## 2023-02-14 VITALS
WEIGHT: 187.83 LBS | TEMPERATURE: 98.4 F | HEIGHT: 66 IN | BODY MASS INDEX: 30.19 KG/M2 | SYSTOLIC BLOOD PRESSURE: 160 MMHG | RESPIRATION RATE: 16 BRPM | OXYGEN SATURATION: 98 % | DIASTOLIC BLOOD PRESSURE: 74 MMHG | HEART RATE: 78 BPM

## 2023-02-14 DIAGNOSIS — S22.42XA CLOSED FRACTURE OF MULTIPLE RIBS OF LEFT SIDE, INITIAL ENCOUNTER: ICD-10-CM

## 2023-02-14 LAB
ANION GAP SERPL CALCULATED.3IONS-SCNC: 13 MMOL/L (ref 5–18)
ATRIAL RATE - MUSE: 72 BPM
BASOPHILS # BLD AUTO: 0.1 10E3/UL (ref 0–0.2)
BASOPHILS NFR BLD AUTO: 1 %
BUN SERPL-MCNC: 17 MG/DL (ref 8–22)
CALCIUM SERPL-MCNC: 9.2 MG/DL (ref 8.5–10.5)
CHLORIDE BLD-SCNC: 109 MMOL/L (ref 98–107)
CO2 SERPL-SCNC: 20 MMOL/L (ref 22–31)
CREAT SERPL-MCNC: 0.82 MG/DL (ref 0.6–1.1)
DIASTOLIC BLOOD PRESSURE - MUSE: NORMAL MMHG
EOSINOPHIL # BLD AUTO: 0.2 10E3/UL (ref 0–0.7)
EOSINOPHIL NFR BLD AUTO: 2 %
ERYTHROCYTE [DISTWIDTH] IN BLOOD BY AUTOMATED COUNT: 14.6 % (ref 10–15)
GFR SERPL CREATININE-BSD FRML MDRD: 81 ML/MIN/1.73M2
GLUCOSE BLD-MCNC: 158 MG/DL (ref 70–125)
HCT VFR BLD AUTO: 42.8 % (ref 35–47)
HGB BLD-MCNC: 14.1 G/DL (ref 11.7–15.7)
IMM GRANULOCYTES # BLD: 0 10E3/UL
IMM GRANULOCYTES NFR BLD: 0 %
INTERPRETATION ECG - MUSE: NORMAL
LYMPHOCYTES # BLD AUTO: 2.1 10E3/UL (ref 0.8–5.3)
LYMPHOCYTES NFR BLD AUTO: 22 %
MCH RBC QN AUTO: 29.9 PG (ref 26.5–33)
MCHC RBC AUTO-ENTMCNC: 32.9 G/DL (ref 31.5–36.5)
MCV RBC AUTO: 91 FL (ref 78–100)
MONOCYTES # BLD AUTO: 0.5 10E3/UL (ref 0–1.3)
MONOCYTES NFR BLD AUTO: 5 %
NEUTROPHILS # BLD AUTO: 6.4 10E3/UL (ref 1.6–8.3)
NEUTROPHILS NFR BLD AUTO: 70 %
NRBC # BLD AUTO: 0 10E3/UL
NRBC BLD AUTO-RTO: 0 /100
P AXIS - MUSE: 52 DEGREES
PLATELET # BLD AUTO: 212 10E3/UL (ref 150–450)
POTASSIUM BLD-SCNC: 4 MMOL/L (ref 3.5–5)
PR INTERVAL - MUSE: 140 MS
QRS DURATION - MUSE: 82 MS
QT - MUSE: 380 MS
QTC - MUSE: 416 MS
R AXIS - MUSE: 52 DEGREES
RBC # BLD AUTO: 4.71 10E6/UL (ref 3.8–5.2)
SODIUM SERPL-SCNC: 142 MMOL/L (ref 136–145)
SYSTOLIC BLOOD PRESSURE - MUSE: NORMAL MMHG
T AXIS - MUSE: 31 DEGREES
TROPONIN I SERPL-MCNC: 0.01 NG/ML (ref 0–0.29)
VENTRICULAR RATE- MUSE: 72 BPM
WBC # BLD AUTO: 9.2 10E3/UL (ref 4–11)

## 2023-02-14 PROCEDURE — 80048 BASIC METABOLIC PNL TOTAL CA: CPT | Performed by: EMERGENCY MEDICINE

## 2023-02-14 PROCEDURE — 72128 CT CHEST SPINE W/O DYE: CPT

## 2023-02-14 PROCEDURE — 96374 THER/PROPH/DIAG INJ IV PUSH: CPT | Mod: 59

## 2023-02-14 PROCEDURE — 84484 ASSAY OF TROPONIN QUANT: CPT | Performed by: EMERGENCY MEDICINE

## 2023-02-14 PROCEDURE — 36415 COLL VENOUS BLD VENIPUNCTURE: CPT | Performed by: EMERGENCY MEDICINE

## 2023-02-14 PROCEDURE — 250N000011 HC RX IP 250 OP 636: Performed by: EMERGENCY MEDICINE

## 2023-02-14 PROCEDURE — 74177 CT ABD & PELVIS W/CONTRAST: CPT

## 2023-02-14 PROCEDURE — 250N000013 HC RX MED GY IP 250 OP 250 PS 637: Performed by: EMERGENCY MEDICINE

## 2023-02-14 PROCEDURE — 85025 COMPLETE CBC W/AUTO DIFF WBC: CPT | Performed by: EMERGENCY MEDICINE

## 2023-02-14 PROCEDURE — 93005 ELECTROCARDIOGRAM TRACING: CPT | Performed by: EMERGENCY MEDICINE

## 2023-02-14 PROCEDURE — 99285 EMERGENCY DEPT VISIT HI MDM: CPT | Mod: 25

## 2023-02-14 RX ORDER — OXYCODONE HYDROCHLORIDE 5 MG/1
5 TABLET ORAL EVERY 6 HOURS PRN
Qty: 12 TABLET | Refills: 0 | Status: SHIPPED | OUTPATIENT
Start: 2023-02-14 | End: 2023-02-17

## 2023-02-14 RX ORDER — LIDOCAINE 4 G/G
1 PATCH TOPICAL ONCE
Status: DISCONTINUED | OUTPATIENT
Start: 2023-02-14 | End: 2023-02-14 | Stop reason: HOSPADM

## 2023-02-14 RX ORDER — LORAZEPAM 0.5 MG/1
TABLET ORAL
Qty: 30 TABLET | Refills: 0 | Status: SHIPPED | OUTPATIENT
Start: 2023-02-14 | End: 2023-08-23

## 2023-02-14 RX ORDER — ACETAMINOPHEN 325 MG/1
975 TABLET ORAL ONCE
Status: COMPLETED | OUTPATIENT
Start: 2023-02-14 | End: 2023-02-14

## 2023-02-14 RX ORDER — KETOROLAC TROMETHAMINE 15 MG/ML
15 INJECTION, SOLUTION INTRAMUSCULAR; INTRAVENOUS ONCE
Status: COMPLETED | OUTPATIENT
Start: 2023-02-14 | End: 2023-02-14

## 2023-02-14 RX ORDER — IOPAMIDOL 755 MG/ML
100 INJECTION, SOLUTION INTRAVASCULAR ONCE
Status: COMPLETED | OUTPATIENT
Start: 2023-02-14 | End: 2023-02-14

## 2023-02-14 RX ADMIN — ACETAMINOPHEN 975 MG: 325 TABLET ORAL at 18:37

## 2023-02-14 RX ADMIN — KETOROLAC TROMETHAMINE 15 MG: 15 INJECTION, SOLUTION INTRAMUSCULAR; INTRAVENOUS at 18:38

## 2023-02-14 RX ADMIN — IOPAMIDOL 100 ML: 755 INJECTION, SOLUTION INTRAVENOUS at 17:25

## 2023-02-14 RX ADMIN — LIDOCAINE 1 PATCH: 246 PATCH TOPICAL at 18:38

## 2023-02-14 NOTE — ED NOTES
ED Triage Provider Note  [facility] EMERGENCY ROOM  Encounter Date: Feb 14, 2023    History:  No chief complaint on file.    Jonn Watson is a 63 year old female who presents to the ED with 6d ago when stepping out of car, slipped and her mid back hit the running board of the car (was during work so workers comp and went to the occupation therapy).  They did many xrays, gave shot of tramadol and sent for a ct scan.  Ct done that night.  She read the results and didn't believe there was any finding when she red the results.  She got ibuprofen and tramadol.  C/o persistent pain in her left posterior flank that severely spasms with cough or movement.1d ptp had a small cough and sudden severe pain in catherine tarea that then radiated towards her left front chest.  Pain persisted in that area since then.  Made appt to see her pmd today but they recommended she come to the ER.    Review of Systems:  Left chest/flank Pain now 6/10.  Last tramadol for her pain last night.      Exam:  There were no vitals taken for this visit.  General: No acute distress. Appears stated age.   Cardio: normal rate, extremities well perfused  Resp: Normal work of breathing, grossly normal respiratory rate  Neuro: Alert. Facial movement grossly symmetric. Grossly intact strength.       Medical Decision Making:  Patient arriving to the ED with problem as above. A medical screening exam was performed. Initial differential diagnosis includes but not limited to rib fracture, muscle spasm, splenic injury, pneumothorax, herniated thoraci disc.  less likely cardiac ischemia.    Labs/ekg/imaging orders initiated from Triage. The patient is most appropriate to return to the waiting room.       Margaret Mcwilliams MD  2/14/2023 at 4:42 PM     Margaret Mcwilliams MD  02/14/23 0834

## 2023-02-14 NOTE — TELEPHONE ENCOUNTER
"Triage started due to mychart 2/14  Pt fell and hit back on 2/7 in the parking lot of her car. Pt was originally seen by a provider that her HR recommended. Pt had sent a my chart today stating she made an appointment in March.     Called pt to triage. Pt states since her injury she is now having \"rib\"/abdominal pain and severity depends on how she moves. She rates it a 10/10 at times that affects her mobility.     Educated pt that since this is a new symptoms since her last follow up and with pain 10/10 she should be seen in ER. Pt on fence about going to ER or waiting until scheduled appointment. \"I made it this far already.\" Red flag symptoms reviewed and education on when appropriate for UC/ER.    Pt at end of call states she is going to [facility] ER for evaluation.     Reason for Disposition    SEVERE abdominal pain (e.g., excruciating)    Constant abdominal pain lasting > 2 hours    Additional Information    Negative: Dangerous mechanism of injury (e.g., MVA, contact sports, trampoline, diving, fall > 10 feet or 3 meters)  (Exception: Back pain began > 1 hour after injury.)    Negative: Bullet, knife or other serious penetrating wound    Negative: Sounds like a life-threatening emergency to the triager    Negative: Shock suspected (e.g., cold/pale/clammy skin, too weak to stand, low BP, rapid pulse)    Negative: Back pain not from an injury    Negative: Back pain from overuse (work, exercise, gardening) OR from twisting, lifting, or bending injury    Negative: SEVERE pain in kidney area (flank) that follows a direct blow to that site    Negative: Blood in urine (red, pink, or tea-colored)    Negative: Unable to urinate (or only a few drops) > 4 hours and bladder feels very full (e.g., palpable bladder or strong urge to urinate)    Negative: Loss of bladder or bowel control (urine or bowel incontinence; wetting self, leaking stool) of new-onset    Negative: Numbness (loss of sensation) in groin or rectal " area    Negative: Skin is split open or gaping (length > 1/2 inch or 12 mm)    Negative: Puncture wound of back    Negative: Bleeding won't stop after 10 minutes of direct pressure (using correct technique)    Negative: Sounds like a serious injury to the triager    Negative: Weakness (i.e., paralysis, loss of muscle strength) of the leg(s) or foot and sudden onset after back injury    Negative: Numbness (i.e., loss of sensation) of the leg(s) or foot and sudden onset after back injury    Negative: Major bleeding (actively dripping or spurting) that can't be stopped    Negative: Passed out (i.e., fainted, collapsed and was not responding)    Negative: Shock suspected (e.g., cold/pale/clammy skin, too weak to stand, low BP, rapid pulse)    Negative: Sounds like a life-threatening emergency to the triager    Negative: Chest pain    Negative: Pain is mainly in upper abdomen (if needed ask: 'is it mainly above the belly button?')    Negative: Abdominal pain and pregnant < 20 weeks    Negative: Abdominal pain and pregnant 20 or more weeks    Protocols used: ABDOMINAL PAIN - FEMALE-A-OH, BACK INJURY-A-OH

## 2023-02-14 NOTE — TELEPHONE ENCOUNTER
"  Answer Assessment - Initial Assessment Questions  1. LOCATION: \"Where does it hurt?\"       Back and \"ribs\"  2. RADIATION: \"Does the pain shoot anywhere else?\" (e.g., chest, back)        3. ONSET: \"When did the pain begin?\" (e.g., minutes, hours or days ago)       Back pain started 2/7 from injury, had initial evaluation.  Rib pain started 2 days ago  4. SUDDEN: \"Gradual or sudden onset?\"     sudden  5. PATTERN \"Does the pain come and go, or is it constant?\"     - If constant: \"Is it getting better, staying the same, or worsening?\"       (Note: Constant means the pain never goes away completely; most serious pain is constant and it progresses)      - If intermittent: \"How long does it last?\" \"Do you have pain now?\"      (Note: Intermittent means the pain goes away completely between bouts)     Constant pain  6. SEVERITY: \"How bad is the pain?\"  (e.g., Scale 1-10; mild, moderate, or severe)    - MILD (1-3): doesn't interfere with normal activities, abdomen soft and not tender to touch     - MODERATE (4-7): interferes with normal activities or awakens from sleep, abdomen tender to touch     - SEVERE (8-10): excruciating pain, doubled over, unable to do any normal activities      Variability in severity. 4/10 without movement. 10/10 with movement  7. RECURRENT SYMPTOM: \"Have you ever had this type of stomach pain before?\" If Yes, ask: \"When was the last time?\" and \"What happened that time?\"       no  8. CAUSE: \"What do you think is causing the stomach pain?\"      unknown  9. RELIEVING/AGGRAVATING FACTORS: \"What makes it better or worse?\" (e.g., movement, antacids, bowel movement)      Movement makes it worse.   10. OTHER SYMPTOMS: \"Do you have any other symptoms?\" (e.g., back pain, diarrhea, fever, urination pain, vomiting)        Denied weakness, numbness, bladder issues  11. PREGNANCY: \"Is there any chance you are pregnant?\" \"When was your last menstrual period?\"        NA    Protocols used: ABDOMINAL PAIN - " FEMALE-A-OH

## 2023-02-14 NOTE — ED TRIAGE NOTES
Patient reports slipping and falling, she struck her left rib area on the running board of a vehicle. She has since had pain to that area, She takes tramadol Q6H PRN, last dose 2300 yesterday.      Triage Assessment     Row Name 02/14/23 [id number]       Triage Assessment (Adult)    Airway WDL WDL       Respiratory WDL    Respiratory WDL WDL       Skin Circulation/Temperature WDL    Skin Circulation/Temperature WDL WDL       Cardiac WDL    Cardiac WDL X  HTN       Peripheral/Neurovascular WDL    Peripheral Neurovascular WDL WDL       Cognitive/Neuro/Behavioral WDL    Cognitive/Neuro/Behavioral WDL WDL

## 2023-02-15 NOTE — DISCHARGE INSTRUCTIONS
Your repeated CT today does show that you have 2 rib fractures involving the left eighth and ninth rib.  These are more apparent on the CT today because they are now a little bit more displaced.  There was no other traumatic injury identified, specifically no lung injury or spleen injury on that side.  We would recommend continuing to use scheduled Tylenol 650 mg every 6 hours, ibuprofen 600 mg every 6 hours, and topical lidocaine patches for additional pain management.  When you have worse pain, you can use oxycodone every 6 hours as needed, try to preserve this primarily for nighttime use and do not drive or work while you are using this medication for pain.  Regarding work, we would recommend lifting restriction of no more than 10 pounds for the next 4 weeks or until further directed by primary care.  Please plan to follow-up in your clinic to review things within the next couple of weeks and discuss your recovery further.

## 2023-02-15 NOTE — ED PROVIDER NOTES
EMERGENCY DEPARTMENT ENCOUNTER      NAME: Jonn Watson  AGE: 63 year old female  YOB: 1960  MRN: [id number]  EVALUATION DATE & TIME: 2023  5:44 PM    PCP: Lairsa Larson    ED PROVIDER: Arslan Benjamin M.D.      Chief Complaint   Patient presents with     Back Pain         FINAL IMPRESSION:  1. Closed fracture of multiple ribs of left side, initial encounter          ED COURSE & MEDICAL DECISION MAKIN:15 PM I introduced myself to the patient, obtained patient history, and performed a physical exam. I also updated them on laboratory and imaging results. We discussed the plan for discharge and the patient is agreeable. Reviewed supportive cares, symptomatic treatment, outpatient follow up, and reasons to return to the Emergency Department. Patient to be discharged by ED RN.   6:28 PM Patient is set to be discharged.     63 year old female presents to the Emergency Department for evaluation of left-sided rib pain.  She is vitally stable and well-appearing when she arrives to the emergency department.  Uncomfortable with any palpation of her left upper back or lateral ribs.  Fell about a week ago and actually had a negative CT scan done last week.  Repeat imaging today does show some interval displacement of left eighth and ninth rib fractures, no other traumatic injury was identified specifically no evidence of pneumothorax, hemoperitoneum, spleen laceration, etc.  I think all of her symptoms are attributable to these rib fractures progressing essentially as expected.  We did do some additional education here.  She was given some Toradol and a lidocaine patch as she is going to be driving herself home.  I did prescribe her some additional oxycodone and she was given some instructions for incentive spirometry.  We did discuss the recovery likely to take a few weeks we discussed lifting precautions.  Patient was in agreement with this.  We also discussed her elevated blood pressure that I  think is more related to pain at this point but can be rechecked in clinic.  Patient was agreeable with the plan for discharge and follow-up.    At the conclusion of the encounter I discussed the results of all of the tests and the disposition. The questions were answered. The patient or family acknowledged understanding and was agreeable with the care plan.       Medical Decision Making    History:    Supplemental history from: Documented in chart, if applicable    External Record(s) reviewed: Documented in chart, if applicable.    Work Up:    Chart documentation includes differential considered and any EKGs or imaging independently interpreted by provider, where specified.    In additional to work up documented, I considered the following work up: Documented in chart, if applicable.    External consultation:    Discussion of management with another provider: Documented in chart, if applicable    Complicating factors:    Care impacted by chronic illness: Hypertension and Mental Health    Care affected by social determinants of health: N/A    Disposition considerations: Discharge. I prescribed additional prescription strength medication(s) as charted. N/A.            MEDICATIONS GIVEN IN THE EMERGENCY:  Medications   Lidocaine (LIDOCARE) 4 % Patch 1 patch (1 patch Transdermal Patch/Med Applied 2/14/23 1838)   acetaminophen (TYLENOL) tablet 975 mg (975 mg Oral Given 2/14/23 1837)   iopamidol (ISOVUE-370) solution 100 mL (100 mLs Intravenous Given 2/14/23 1725)   ketorolac (TORADOL) injection 15 mg (15 mg Intravenous Given 2/14/23 1838)       NEW PRESCRIPTIONS STARTED AT TODAY'S ER VISIT  New Prescriptions    OXYCODONE (ROXICODONE) 5 MG TABLET    Take 1 tablet (5 mg) by mouth every 6 hours as needed for pain          =================================================================    HPI    Patient information was obtained from: Patient     Use of : N/A         Jonn Watson is a 63 year old female with a  "pertinent history of hypertension and anxiety who presents to this ED via walk in for evaluation of back pain.    The patient was getting out of her car when she slipped on ice and fell on her left back a week ago on 2/7/2023. The pain is intermittent, but became worse and started to radiate to her left flank and chest wall which prompted her to present to the ED for further evaluation. The pain is not worse with breathing, but she notes that \"it hurts sometimes with breathing\". She denies any other injuries since then. She also noted having a cough the other night and noted that \"it was so bad I thought I was having a heart attack\". She is currently taking tramadol and ibuprofen (600 MG) for her pain, but did not have any today. She denies any leg swelling or other pain.       REVIEW OF SYSTEMS   All systems reviewed and negative except as noted in HPI.    PAST MEDICAL HISTORY:  Past Medical History:   Diagnosis Date     Anemia      Anxiety      Bladder infection      Bronchitis      Depression      Disease of thyroid gland      Endometriosis      GERD (gastroesophageal reflux disease)      Hiatal hernia      Hip pain     Right>Left     HL (hearing loss)      Hypertension      Kidney stone      Motion sickness        PAST SURGICAL HISTORY:  Past Surgical History:   Procedure Laterality Date     APPENDECTOMY       ARTHRODESIS TOE(S) Left      COMBINED CYSTOSCOPY, INSERT STENT URETER(S) Left 11/11/2016    Procedure: CYSTOSCOPY, LEFT URETEROSCOPY, STENT INSERTION, STONE EXTRACTION;  Surgeon: Patric Isabel MD;  Location: [facility];  Service:      CYSTOSCOPY W/ URETEROSCOPY       DILATION AND CURETTAGE, OPERATIVE HYSTEROSCOPY, COMBINED N/A 10/7/2021    Procedure: HYSTEROSCOPY, WITH DILATION AND CURETTAGE;  Surgeon: Norma Romero DO;  Location: [facility]     LAPAROSCOPY DIAGNOSTIC (GENERAL)      endometriosis     URETEROSCOPY             CURRENT MEDICATIONS:    Current Facility-Administered " Medications   Medication     Lidocaine (LIDOCARE) 4 % Patch 1 patch     Current Outpatient Medications   Medication     oxyCODONE (ROXICODONE) 5 MG tablet     albuterol (PROAIR HFA/PROVENTIL HFA/VENTOLIN HFA) 108 (90 Base) MCG/ACT inhaler     atenolol (TENORMIN) 50 MG tablet     benzonatate (TESSALON) 200 MG capsule     calcium carbonate-vitamin D3 (CALCIUM 600 WITH VITAMIN D3) 600 mg(1,500mg) -400 unit cap     diclofenac (VOLTAREN) 1 % topical gel     doxycycline hyclate (VIBRAMYCIN) 100 MG capsule     hydrOXYzine (ATARAX) 10 MG tablet     ibuprofen (ADVIL/MOTRIN) 800 MG tablet     levothyroxine (SYNTHROID/LEVOTHROID) 50 MCG tablet     LORazepam (ATIVAN) 0.5 MG tablet     methylPREDNISolone (MEDROL DOSEPAK) 4 MG tablet therapy pack     omeprazole (PRILOSEC OTC) 20 MG EC tablet     sertraline (ZOLOFT) 100 MG tablet     triamcinolone (KENALOG) 0.5 % ointment     valACYclovir (VALTREX) 1000 mg tablet         ALLERGIES:  Allergies   Allergen Reactions     Erythromycin Base [Erythromycin] Anaphylaxis and Swelling     Sores in mouth also.     Lisinopril Cough     Losartan Other (See Comments)     General malaize     Sulfa (Sulfonamide Antibiotics) [Sulfa Drugs] Rash       FAMILY HISTORY:  Family History   Problem Relation Age of Onset     Hypertension Mother      Breast Cancer Mother 73.00     Heart Disease Father      Hypertension Father      Aneurysm Sister      Hypertension Brother      Depression Brother      Hypertension Sister      Depression Sister      Hypertension Maternal Grandfather      Gout Paternal Grandmother      Hypertension Paternal Grandmother      Urolithiasis Paternal Grandfather         RECURRENT     Heart Disease Paternal Grandfather      Hypertension Paternal Grandfather        SOCIAL HISTORY:   Social History     Socioeconomic History     Marital status: Single   Tobacco Use     Smoking status: Some Days     Types: Cigarettes     Smokeless tobacco: Former     Tobacco comments:     Casual smoker  "by hx   Substance and Sexual Activity     Alcohol use: No     Drug use: No     Sexual activity: Yes     Partners: Female   Social History Narrative    Single, homosexual, has partner for years. Smokes cigarettes, off and on. Non alcohol drinker. Works in the [employer] and does free [employer].       VITALS:  BP (!) 160/74 (BP Location: Left arm, Patient Position: Sitting)   Pulse 78   Temp 98.4  F (36.9  C) (Oral)   Resp 16   Ht 1.664 m (5' 5.5\")   Wt 85.2 kg (187 lb 13.3 oz)   SpO2 98%   BMI 30.78 kg/m      PHYSICAL EXAM    Constitutional: Well developed, Well nourished, NAD.  HENT: Normocephalic, Atraumatic. Neck Supple.  Eyes: EOMI, Conjunctiva normal.  Respiratory: Breathing comfortably on room air. Speaks full sentences easily. Lungs clear to ascultation.  Cardiovascular: Normal heart rate, Regular rhythm. No peripheral edema.  Abdomen: Soft, nontender  Musculoskeletal: There is some left lateral rib margin tenderness.  No crepitance.  No midline thoracic or lumbar spinal tenderness.  Good range of motion in all other major joints with no significant extremity trauma visible.  Integument: Warm, Dry.  Neurologic: Alert & awake, Normal motor function, Normal sensory function, No focal deficits noted.   Psychiatric: Cooperative. Affect appropriate.     LAB:  All pertinent labs reviewed and interpreted.  Labs Ordered and Resulted from Time of ED Arrival to Time of ED Departure   BASIC METABOLIC PANEL - Abnormal       Result Value    Sodium 142      Potassium 4.0      Chloride 109 (*)     Carbon Dioxide (CO2) 20 (*)     Anion Gap 13      Urea Nitrogen 17      Creatinine 0.82      Calcium 9.2      Glucose 158 (*)     GFR Estimate 81     TROPONIN I - Normal    Troponin I 0.01     CBC WITH PLATELETS AND DIFFERENTIAL    WBC Count 9.2      RBC Count 4.71      Hemoglobin 14.1      Hematocrit 42.8      MCV 91      MCH 29.9      MCHC 32.9      RDW 14.6      Platelet Count 212      % Neutrophils 70      % Lymphocytes " 22      % Monocytes 5      % Eosinophils 2      % Basophils 1      % Immature Granulocytes 0      NRBCs per 100 WBC 0      Absolute Neutrophils 6.4      Absolute Lymphocytes 2.1      Absolute Monocytes 0.5      Absolute Eosinophils 0.2      Absolute Basophils 0.1      Absolute Immature Granulocytes 0.0      Absolute NRBCs 0.0         RADIOLOGY:  Reviewed all pertinent imaging. Please see official radiology report.  CT Thoracic Spine w/o Contrast   Final Result   IMPRESSION:   1.  No acute fracture or traumatic listhesis.         CT Chest/Abdomen/Pelvis w Contrast   Final Result   IMPRESSION:   1.  Minimally displaced left eighth and ninth rib fractures better seen now due to slight displacement.      2.  No pleural effusions and no pneumothorax.      3.  No other significant findings.      4.  Adrenal adenomas. No additional follow-up needed for these.          EKG:    Performed at: 1653    Impression: Sinus rhythm with occasional PVCs, left atrial enlargement, no significant change from previous    Rate: 72  Rhythm: Sinus  Axis: Normal  OK Interval: 140  QRS Interval: 82  QTc Interval: 416  ST Changes: None significant  Comparison: No significant change from August 1, 2022    I have independently reviewed and interpreted the EKG(s) documented above.          I, Usman Mcneil, am serving as a scribe to document services personally performed by Dr. Arslan Benjamin, based on my observation and the provider's statements to me. I, Arslan Benjamin MD attest that Usman Mcneil is acting in a scribe capacity, has observed my performance of the services and has documented them in accordance with my direction.    Arslan Benjamin M.D.  Emergency Medicine  [facility] EMERGENCY ROOM  [address] [id number] [phone]  Dept: [phone]     Arslan Benjamin MD  02/14/23 0660

## 2023-03-06 ENCOUNTER — OFFICE VISIT (OUTPATIENT)
Dept: INTERNAL MEDICINE | Facility: CLINIC | Age: 63
End: 2023-03-06
Payer: OTHER MISCELLANEOUS

## 2023-03-06 VITALS
BODY MASS INDEX: 29.73 KG/M2 | DIASTOLIC BLOOD PRESSURE: 92 MMHG | SYSTOLIC BLOOD PRESSURE: 138 MMHG | TEMPERATURE: 97.1 F | RESPIRATION RATE: 16 BRPM | OXYGEN SATURATION: 98 % | HEIGHT: 66 IN | WEIGHT: 185 LBS | HEART RATE: 68 BPM

## 2023-03-06 DIAGNOSIS — D35.01 ADRENAL ADENOMA, RIGHT: ICD-10-CM

## 2023-03-06 DIAGNOSIS — E78.5 HYPERLIPIDEMIA LDL GOAL <100: ICD-10-CM

## 2023-03-06 DIAGNOSIS — F33.1 MODERATE EPISODE OF RECURRENT MAJOR DEPRESSIVE DISORDER (H): ICD-10-CM

## 2023-03-06 DIAGNOSIS — M80.00XD AGE-RELATED OSTEOPOROSIS WITH CURRENT PATHOLOGICAL FRACTURE WITH ROUTINE HEALING, SUBSEQUENT ENCOUNTER: ICD-10-CM

## 2023-03-06 DIAGNOSIS — D35.02 ADRENAL ADENOMA, LEFT: ICD-10-CM

## 2023-03-06 DIAGNOSIS — F41.9 ANXIETY: ICD-10-CM

## 2023-03-06 DIAGNOSIS — I10 ESSENTIAL HYPERTENSION: ICD-10-CM

## 2023-03-06 DIAGNOSIS — S22.42XD CLOSED FRACTURE OF MULTIPLE RIBS OF LEFT SIDE WITH ROUTINE HEALING, SUBSEQUENT ENCOUNTER: ICD-10-CM

## 2023-03-06 PROCEDURE — 99214 OFFICE O/P EST MOD 30 MIN: CPT | Performed by: NURSE PRACTITIONER

## 2023-03-06 RX ORDER — TRAMADOL HYDROCHLORIDE 50 MG/1
50 TABLET ORAL EVERY 6 HOURS PRN
COMMUNITY
Start: 2023-02-27 | End: 2024-07-31

## 2023-03-06 RX ORDER — IBUPROFEN 600 MG/1
1 TABLET, FILM COATED ORAL
COMMUNITY
Start: 2023-02-08

## 2023-03-06 ASSESSMENT — PATIENT HEALTH QUESTIONNAIRE - PHQ9
SUM OF ALL RESPONSES TO PHQ QUESTIONS 1-9: 0
SUM OF ALL RESPONSES TO PHQ QUESTIONS 1-9: 0
10. IF YOU CHECKED OFF ANY PROBLEMS, HOW DIFFICULT HAVE THESE PROBLEMS MADE IT FOR YOU TO DO YOUR WORK, TAKE CARE OF THINGS AT HOME, OR GET ALONG WITH OTHER PEOPLE: NOT DIFFICULT AT ALL

## 2023-03-06 NOTE — LETTER
[facility]  [address] [id number]  [phone]          March 6, 2023    RE:  Jonn Watson                                                                                                                                                       [address] [place] [address]            To whom it may concern:    Jonn Watson is under my professional care for her healthcare. She was okay to return to work on 02/08/23 without restrictions.       Sincerely,        Larisa Larson RN, CNP

## 2023-03-06 NOTE — PATIENT INSTRUCTIONS
Start following the cardiometabolic lifestyle.    Start exercising, goals for exercise 150 minutes/week, getting her heart rate up, breaking a sweat.    We will recheck her blood pressure today.    Lets recheck a CT abdomen and pelvis in 3 months, call [phone] to get this scheduled.    Please let me know how you would like to see for your bones, osteoporosis.    For the Calcium, you should be getting in 1200mg of calcium daily and Vitamin D 5,000 units daily.    Switch to the below calcium and vitamin D supplement found on [employer]:    Calcium with Vitamin D3  1200 mg  240 Softgels  5000 IU Vitamin D3  Absorbable Calcium Supplement  Non-GMO, Gluten Free Calcium Supplement  by Margie    Follow up in three months for your physical with fasted labs, before then if anything comes up.

## 2023-03-27 DIAGNOSIS — F51.02 ADJUSTMENT INSOMNIA: ICD-10-CM

## 2023-03-29 RX ORDER — HYDROXYZINE HYDROCHLORIDE 10 MG/1
TABLET, FILM COATED ORAL
Qty: 30 TABLET | Refills: 4 | Status: SHIPPED | OUTPATIENT
Start: 2023-03-29 | End: 2023-04-26

## 2023-03-29 NOTE — TELEPHONE ENCOUNTER
Patient checking on refill status.    Would like a 3 month supply or 90 pills if possible.      Ani Freeman on 3/29/2023 at 8:33 AM

## 2023-03-29 NOTE — TELEPHONE ENCOUNTER
"Routing refill request to provider for review/approval because:  Patient had a recent visit. Please update sig.   Patient is requesting a 90 day supply    Last Written Prescription Date:  09/16/2022  Last Fill Quantity: 30,  # refills: 4   Last office visit provider:  03/06/2023     Requested Prescriptions   Pending Prescriptions Disp Refills     hydrOXYzine (ATARAX) 10 MG tablet 30 tablet 4     Sig: TAKE 1 TO 1 AND 1/2 TABLETS BY MOUTH AT BEDTIME AS NEEDED. OVERDUE FOR OFFICE VISIT       Antihistamines Protocol Passed - 3/29/2023  9:07 AM        Passed - Recent (12 mo) or future (30 days) visit within the authorizing provider's specialty     Patient has had an office visit with the authorizing provider or a provider within the authorizing providers department within the previous 12 mos or has a future within next 30 days. See \"Patient Info\" tab in inbasket, or \"Choose Columns\" in Meds & Orders section of the refill encounter.              Passed - Patient is age 3 or older     Apply age and/or weight-based dosing for peds patients age 3 and older.    Forward request to provider for patients under the age of 3.          Passed - Medication is active on med list             Alannah Angel RN 03/29/23 9:09 AM  "

## 2023-03-29 NOTE — TELEPHONE ENCOUNTER
"Routing refill request to provider for review/approval because:  PT recently had an appt. Sig needs to be updated    Last Written Prescription Date:  9/16/2022  Last Fill Quantity: 30,  # refills: 4   Last office visit provider:  3/6/2023     Requested Prescriptions   Pending Prescriptions Disp Refills     hydrOXYzine (ATARAX) 10 MG tablet 30 tablet 4     Sig: TAKE 1 TO 1 AND 1/2 TABLETS BY MOUTH AT BEDTIME AS NEEDED. OVERDUE FOR OFFICE VISIT       Antihistamines Protocol Passed - 3/27/2023  5:46 PM        Passed - Recent (12 mo) or future (30 days) visit within the authorizing provider's specialty     Patient has had an office visit with the authorizing provider or a provider within the authorizing providers department within the previous 12 mos or has a future within next 30 days. See \"Patient Info\" tab in inbasket, or \"Choose Columns\" in Meds & Orders section of the refill encounter.              Passed - Patient is age 3 or older     Apply age and/or weight-based dosing for peds patients age 3 and older.    Forward request to provider for patients under the age of 3.          Passed - Medication is active on med list             Opal Brooks RN 03/28/23 11:14 PM  "

## 2023-04-06 DIAGNOSIS — F41.9 ANXIETY: ICD-10-CM

## 2023-04-06 DIAGNOSIS — I10 ESSENTIAL HYPERTENSION: ICD-10-CM

## 2023-04-07 RX ORDER — SERTRALINE HYDROCHLORIDE 100 MG/1
TABLET, FILM COATED ORAL
Qty: 180 TABLET | Refills: 3 | Status: SHIPPED | OUTPATIENT
Start: 2023-04-07 | End: 2024-06-10

## 2023-04-07 RX ORDER — ATENOLOL 50 MG/1
50 TABLET ORAL DAILY
Qty: 90 TABLET | Refills: 3 | Status: SHIPPED | OUTPATIENT
Start: 2023-04-07 | End: 2024-04-17

## 2023-04-07 NOTE — TELEPHONE ENCOUNTER
"Routing refill request to provider for review/approval because:  Blood pressure out of range  Early refill request    Last Written Prescription Date:  05/25/2022  Last Fill Quantity: 90,  # refills: 3   Last office visit provider:  03/06/2023     Requested Prescriptions   Pending Prescriptions Disp Refills     atenolol (TENORMIN) 50 MG tablet 90 tablet 3     Sig: Take 1 tablet (50 mg) by mouth daily       Beta-Blockers Protocol Failed - 4/7/2023  1:46 PM        Failed - Blood pressure under 140/90 in past 12 months     BP Readings from Last 3 Encounters:   03/06/23 (!) 138/92   02/14/23 (!) 160/74   12/07/22 137/85                 Passed - Patient is age 6 or older        Passed - Recent (12 mo) or future (30 days) visit within the authorizing provider's specialty     Patient has had an office visit with the authorizing provider or a provider within the authorizing providers department within the previous 12 mos or has a future within next 30 days. See \"Patient Info\" tab in inbasket, or \"Choose Columns\" in Meds & Orders section of the refill encounter.              Passed - Medication is active on med list             Alannah Angel RN 04/07/23 1:46 PM  "

## 2023-04-07 NOTE — TELEPHONE ENCOUNTER
"Routing refill request to provider for review/approval because:  Early refill request    Last Written Prescription Date:  05/16/2022  Last Fill Quantity: 180,  # refills: 3   Last office visit provider:  03/06/2023     Requested Prescriptions   Pending Prescriptions Disp Refills     sertraline (ZOLOFT) 100 MG tablet 180 tablet 3     Sig: TAKE 2 TABLETS(200 MG) BY MOUTH DAILY       SSRIs Protocol Passed - 4/7/2023  1:48 PM        Passed - Recent (12 mo) or future (30 days) visit within the authorizing provider's specialty     Patient has had an office visit with the authorizing provider or a provider within the authorizing providers department within the previous 12 mos or has a future within next 30 days. See \"Patient Info\" tab in inbasket, or \"Choose Columns\" in Meds & Orders section of the refill encounter.              Passed - Medication is active on med list        Passed - Patient is age 18 or older        Passed - No active pregnancy on record        Passed - No positive pregnancy test in last 12 months             Alannah Angel RN 04/07/23 1:49 PM  "

## 2023-04-19 ENCOUNTER — OFFICE VISIT (OUTPATIENT)
Dept: INTERNAL MEDICINE | Facility: CLINIC | Age: 63
End: 2023-04-19
Payer: COMMERCIAL

## 2023-04-19 VITALS
DIASTOLIC BLOOD PRESSURE: 88 MMHG | OXYGEN SATURATION: 99 % | HEART RATE: 77 BPM | WEIGHT: 187 LBS | SYSTOLIC BLOOD PRESSURE: 138 MMHG | RESPIRATION RATE: 16 BRPM | BODY MASS INDEX: 30.05 KG/M2 | HEIGHT: 66 IN | TEMPERATURE: 97.6 F

## 2023-04-19 DIAGNOSIS — E66.811 CLASS 1 OBESITY DUE TO EXCESS CALORIES WITH SERIOUS COMORBIDITY AND BODY MASS INDEX (BMI) OF 30.0 TO 30.9 IN ADULT: ICD-10-CM

## 2023-04-19 DIAGNOSIS — Z12.11 ENCOUNTER FOR COLONOSCOPY DUE TO HISTORY OF ADENOMATOUS COLONIC POLYPS: ICD-10-CM

## 2023-04-19 DIAGNOSIS — Z01.818 PREOPERATIVE EXAMINATION: ICD-10-CM

## 2023-04-19 DIAGNOSIS — K21.00 GASTROESOPHAGEAL REFLUX DISEASE WITH ESOPHAGITIS WITHOUT HEMORRHAGE: ICD-10-CM

## 2023-04-19 DIAGNOSIS — D35.01 ADRENAL ADENOMA, RIGHT: ICD-10-CM

## 2023-04-19 DIAGNOSIS — E03.9 ACQUIRED HYPOTHYROIDISM: ICD-10-CM

## 2023-04-19 DIAGNOSIS — E55.9 VITAMIN D DEFICIENCY: ICD-10-CM

## 2023-04-19 DIAGNOSIS — E66.09 CLASS 1 OBESITY DUE TO EXCESS CALORIES WITH SERIOUS COMORBIDITY AND BODY MASS INDEX (BMI) OF 30.0 TO 30.9 IN ADULT: ICD-10-CM

## 2023-04-19 DIAGNOSIS — Z86.0101 ENCOUNTER FOR COLONOSCOPY DUE TO HISTORY OF ADENOMATOUS COLONIC POLYPS: ICD-10-CM

## 2023-04-19 DIAGNOSIS — E78.5 HYPERLIPIDEMIA LDL GOAL <100: ICD-10-CM

## 2023-04-19 DIAGNOSIS — D35.02 ADRENAL ADENOMA, LEFT: ICD-10-CM

## 2023-04-19 DIAGNOSIS — J34.89 INTERNAL NASAL LESION: ICD-10-CM

## 2023-04-19 DIAGNOSIS — R73.03 PREDIABETES: ICD-10-CM

## 2023-04-19 DIAGNOSIS — I10 ESSENTIAL HYPERTENSION: ICD-10-CM

## 2023-04-19 DIAGNOSIS — H91.93 BILATERAL HEARING LOSS, UNSPECIFIED HEARING LOSS TYPE: ICD-10-CM

## 2023-04-19 DIAGNOSIS — F33.1 MODERATE EPISODE OF RECURRENT MAJOR DEPRESSIVE DISORDER (H): ICD-10-CM

## 2023-04-19 PROCEDURE — 99214 OFFICE O/P EST MOD 30 MIN: CPT | Performed by: NURSE PRACTITIONER

## 2023-04-19 RX ORDER — ONDANSETRON 4 MG/1
4 TABLET, FILM COATED ORAL EVERY 8 HOURS PRN
Qty: 10 TABLET | Refills: 0 | Status: SHIPPED | OUTPATIENT
Start: 2023-04-19 | End: 2023-09-13

## 2023-04-19 RX ORDER — TRIAMCINOLONE ACETONIDE 5 MG/G
1 OINTMENT TOPICAL 2 TIMES DAILY
Qty: 30 G | Refills: 3 | Status: SHIPPED | OUTPATIENT
Start: 2023-04-19 | End: 2023-12-20

## 2023-04-19 NOTE — PROGRESS NOTES
[facility]  [address] [facility] [address]  Phone: [phone]  Fax: [phone]  Primary Provider: Chanelle Saucedo  Pre-op Performing Provider: CHANELLE SAUCEDO  PREOPERATIVE EVALUATION:  Today's date: 4/19/2023    Jonn Watson is a 63 year old female who presents for a preoperative evaluation.      4/19/2023     7:46 AM   Additional Questions   Roomed by Liyah VAUGHAN     Surgical Information:  Surgery/Procedure: Colonoscopy  Surgery Location: [place]  Surgeon: Dr Bettencourt  Surgery Date: 4/21/2023  Time of Surgery:   Where patient plans to recover: At home with family  Fax number for surgical facility: [phone]  Assessment & Plan     The proposed surgical procedure is considered LOW risk.    Preoperative examination: Completed today. Labs and EKG are not needed. She will hold her Tramadol and Ibuprofen prior to procedure.     Encounter for colonoscopy due to history of adenomatous colonic polyps: To have a repeat colonoscopy. Discussed drinking her prep slowly; Zofran as needed if starting to have issues with nausea.   - ondansetron (ZOFRAN) 4 MG tablet  Dispense: 10 tablet; Refill: 0    Essential hypertension: Blood pressure today was 138/88. She continues on Atenolol, stable.    Prediabetes: A1c last was 6.3%. Discussed diet and exercise.   - Hemoglobin A1c    Hyperlipidemia LDL goal <100: She continues off of statins, discussed diet and exercise.     Acquired hypothyroidism: She continues on Levothyroxine. Stable.    Moderate episode of recurrent major depressive disorder (H): Managed with Sertraline and PRN Lorazepam. Stable.     Gastroesophageal reflux disease with esophagitis without hemorrhage: She continues on Omeprazole. Stable.     Adrenal adenoma, left/Adrenal adenoma, right: Last CT showed 02/14/23: Stable 4.0 x 2.3 cm left adrenal adenoma and probable 7 mm right adrenal adenoma. Will monitor over time.     Bilateral hearing loss, unspecified hearing  loss type: Uses hearing aids. Stable.    Class 1 obesity due to excess calories with serious comorbidity and body mass index (BMI) of 30.0 to 30.9 in adult: BMI today was 30.65. Discussed diet and exercise.     Vitamin D deficiency: Continues on supplement. Stable.    Risks and Recommendations:  The patient has the following additional risks and recommendations for perioperative complications:   - No identified additional risk factors other than previously addressed    Medication Instructions:  Hold Tramadol and Ibuprofen prior to procedure.    RECOMMENDATION:  APPROVAL GIVEN to proceed with proposed procedure, without further diagnostic evaluation.      Subjective     HPI related to upcoming procedure: The patient presents today for a preoperative examination. She will be having a colonoscopy done.    She reports that she drank her last prep quickly; and had some vomiting with this. Discussed that she should drink it slowly; will also send a few Zofran into pharmacy just in case.    She will hold her Tramadol and Ibuprofen.    She reports that she knows she has to start eating better and exercising. She will follow up in June for a recheck of labs and weight.     She would to be a full code.    No issues with anesthesia in the past.         4/18/2023     8:54 PM   Preop Questions   1. Have you ever had a heart attack or stroke? No   2. Have you ever had surgery on your heart or blood vessels, such as a stent placement, a coronary artery bypass, or surgery on an artery in your head, neck, heart, or legs? No   3. Do you have chest pain with activity? No   4. Do you have a history of  heart failure? No   5. Do you currently have a cold, bronchitis or symptoms of other infection? No   6. Do you have a cough, shortness of breath, or wheezing? No   7. Do you or anyone in your family have previous history of blood clots? No   8. Do you or does anyone in your family have a serious bleeding problem such as prolonged  bleeding following surgeries or cuts? No   9. Have you ever had problems with anemia or been told to take iron pills? No   10. Have you had any abnormal blood loss such as black, tarry or bloody stools, or abnormal vaginal bleeding? No   11. Have you ever had a blood transfusion? No   12. Are you willing to have a blood transfusion if it is medically needed before, during, or after your surgery? Yes   13. Have you or any of your relatives ever had problems with anesthesia? No   14. Do you have sleep apnea, excessive snoring or daytime drowsiness? No   15. Do you have any artifical heart valves or other implanted medical devices like a pacemaker, defibrillator, or continuous glucose monitor? No   16. Do you have artificial joints? No   17. Are you allergic to latex? No       Health Care Directive:  Patient does not have a Health Care Directive or Living Will: Full Code    Preoperative Review of :   reviewed - controlled substances reflected in medication list.    Review of Systems  Constitutional, neuro, ENT, endocrine, pulmonary, cardiac, gastrointestinal, genitourinary, musculoskeletal, integument and psychiatric systems are negative, except as otherwise noted.    Patient Active Problem List    Diagnosis Date Noted     Moderate episode of recurrent major depressive disorder (H) 03/06/2023     Priority: Medium     Adenoma of large intestine 09/12/2022     Priority: Medium     Benign neoplasm of descending colon 06/30/2022     Priority: Medium     Benign neoplasm of transverse colon 06/30/2022     Priority: Medium     Hemorrhage of rectum and anus 06/30/2022     Priority: Medium     Hemorrhoids 06/28/2022     Priority: Medium     Polyp of colon 06/28/2022     Priority: Medium     Acute lower GI bleeding 06/21/2022     Priority: Medium     Hiatal hernia 05/16/2022     Priority: Medium     Formatting of this note might be different from the original.  Created by Conversion    Replacement Utility updated for  latest IMO load       Postmenopausal bleeding 05/16/2022     Priority: Medium     Hypertension      Priority: Medium     Created by Conversion  Replacement Utility updated for latest IMO load         KVNG (generalized anxiety disorder) 01/24/2017     Priority: Medium     Formatting of this note is different from the original.  Psychiatry to Primary Care Hand-over  Psychiatrist: Matthew Gan MD   PCP: [facility]  Last Visit with Psychiatry:  1/24/2017  Current Medication/s and dose/s: Zoloft 200 QAM, Hydroxyzine 10-20 QHS prn, Ativan 0.5 QD prn (VERY RARELY USES)  Medications tried and failed/Reason(s) for discontinuation: Remeron (daytime sedation)  Recommendations/Plan of care: Continue present medications + therapy (prn with Joyce MUSTAFA)  Recommended Labs Monitoring and Frequency: None required  Last PHQ-9: 0  PHQ-9 1/24/2017 1/14/2016 8/5/2015 7/14/2015 3/18/2015 1/20/2015 12/1/2014   PHQ9 Score - Smartform 0 0 0 0 0 1 0       Arthropathy Of Multiple Sites      Priority: Medium     Created by Conversion  Replacement Utility updated for latest IMO load         Hypothyroidism      Priority: Medium     Created by Conversion  Replacement Utility updated for latest IMO load         Arthropathy Of The Ankle / Foot      Priority: Medium     Created by Conversion  Replacement Utility updated for latest IMO load         Anxiety      Priority: Medium     Created by Conversion  Replacement Utility updated for latest IMO load         Hiatal Hernia      Priority: Medium     Created by Conversion  Replacement Utility updated for latest IMO load         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     Priority: Medium     Esophageal Reflux      Priority: Medium     Created by Conversion         Dizziness      Priority: Medium     Created by Conversion         Nontoxic Solitary Thyroid Nodule      Priority: Medium     Created by Conversion         Iron Deficiency      Priority: Medium     Created by Conversion          Fatigue      Priority: Medium     Created by Conversion         Midback Pain      Priority: Medium     Created by Conversion         Nephrolithiasis      Priority: Medium     Created by Conversion          Past Medical History:   Diagnosis Date     Anemia      Anxiety      Bladder infection      Bronchitis      Depression      Disease of thyroid gland      Endometriosis      GERD (gastroesophageal reflux disease)      Hiatal hernia      Hip pain     Right>Left     HL (hearing loss)      Hypertension      Kidney stone      Motion sickness      Past Surgical History:   Procedure Laterality Date     APPENDECTOMY       ARTHRODESIS TOE(S) Left      COMBINED CYSTOSCOPY, INSERT STENT URETER(S) Left 11/11/2016    Procedure: CYSTOSCOPY, LEFT URETEROSCOPY, STENT INSERTION, STONE EXTRACTION;  Surgeon: Patric Isabel MD;  Location: [facility];  Service:      CYSTOSCOPY W/ URETEROSCOPY       DILATION AND CURETTAGE, OPERATIVE HYSTEROSCOPY, COMBINED N/A 10/7/2021    Procedure: HYSTEROSCOPY, WITH DILATION AND CURETTAGE;  Surgeon: Norma Romero DO;  Location: [facility]     LAPAROSCOPY DIAGNOSTIC (GENERAL)      endometriosis     URETEROSCOPY       Current Outpatient Medications   Medication Sig Dispense Refill     atenolol (TENORMIN) 50 MG tablet Take 1 tablet (50 mg) by mouth daily 90 tablet 3     calcium carbonate-vitamin D3 (CALCIUM 600 WITH VITAMIN D3) 600 mg(1,500mg) -400 unit cap        diclofenac (VOLTAREN) 1 % topical gel APPLY SPARINGLY TO THE AFFECTED AREA ONCE DAILY.       hydrOXYzine (ATARAX) 10 MG tablet TAKE 1 TO 1 AND 1/2 TABLETS BY MOUTH AT BEDTIME AS NEEDED. OVERDUE FOR OFFICE VISIT 30 tablet 4     ibuprofen (ADVIL/MOTRIN) 600 MG tablet Take 1 tablet by mouth 3 times daily       levothyroxine (SYNTHROID/LEVOTHROID) 50 MCG tablet Take 1 tablet (50 mcg) by mouth daily 90 tablet 3     LORazepam (ATIVAN) 0.5 MG tablet TAKE 1 TABLET(0.5 MG) BY MOUTH THREE TIMES DAILY AS NEEDED FOR ANXIETY 30  "tablet 0     omeprazole (PRILOSEC OTC) 20 MG EC tablet Take 20 mg by mouth daily       ondansetron (ZOFRAN) 4 MG tablet Take 1 tablet (4 mg) by mouth every 8 hours as needed for nausea 10 tablet 0     sertraline (ZOLOFT) 100 MG tablet TAKE 2 TABLETS(200 MG) BY MOUTH DAILY 180 tablet 3     traMADol (ULTRAM) 50 MG tablet TAKE 1 TO 3 TABLETS BY MOUTH TWICE DAILY       triamcinolone (KENALOG) 0.5 % ointment [TRIAMCINOLONE (KENALOG) 0.5 % OINTMENT] Apply topically 2 (two) times a day. 30 g 1     valACYclovir (VALTREX) 1000 mg tablet Take 1 tablet (1,000 mg) by mouth 2 times daily 14 tablet 1       Allergies   Allergen Reactions     Erythromycin Base [Erythromycin] Anaphylaxis and Swelling     Sores in mouth also.     Lisinopril Cough     Losartan Other (See Comments)     General malaize     Sulfa (Sulfonamide Antibiotics) [Sulfa Drugs] Rash        Social History     Tobacco Use     Smoking status: Some Days     Types: Cigarettes     Smokeless tobacco: Former     Tobacco comments:     Casual smoker by hx   Vaping Use     Vaping status: Never Used   Substance Use Topics     Alcohol use: No     Family History   Problem Relation Age of Onset     Hypertension Mother      Breast Cancer Mother 73.00     Heart Disease Father      Hypertension Father      Aneurysm Sister      Hypertension Brother      Depression Brother      Hypertension Sister      Depression Sister      Hypertension Maternal Grandfather      Gout Paternal Grandmother      Hypertension Paternal Grandmother      Urolithiasis Paternal Grandfather         RECURRENT     Heart Disease Paternal Grandfather      Hypertension Paternal Grandfather      History   Drug Use No         Objective     /88 (BP Location: Right arm, Patient Position: Sitting)   Pulse 77   Temp 97.6  F (36.4  C)   Resp 16   Ht 1.664 m (5' 5.5\")   Wt 84.8 kg (187 lb)   SpO2 99%   BMI 30.65 kg/m      Physical Exam    GENERAL APPEARANCE: healthy, alert and no distress     EYES: EOMI, " PERRL     HENT: ear canals and TM's normal and nose and mouth without ulcers or lesions     NECK: no adenopathy, no asymmetry, masses, or scars and thyroid normal to palpation     RESP: lungs clear to auscultation - no rales, rhonchi or wheezes     CV: regular rates and rhythm, normal S1 S2, no S3 or S4 and no murmur, click or rub     ABDOMEN:  soft, nontender, no HSM or masses and bowel sounds normal     MS: extremities normal- no gross deformities noted, no evidence of inflammation in joints, FROM in all extremities.     SKIN: no suspicious lesions or rashes     NEURO: Normal strength and tone, sensory exam grossly normal, mentation intact and speech normal     PSYCH: mentation appears normal. and affect normal/bright     LYMPHATICS: No cervical adenopathy    Recent Labs   Lab Test 02/14/23  1657 12/07/22  0801   HGB 14.1 14.7    239    142   POTASSIUM 4.0 3.9   CR 0.82 0.76   A1C  --  6.3*        Diagnostics:  No labs were ordered during this visit.   No EKG required for low risk surgery (cataract, skin procedure, breast biopsy, etc).    Revised Cardiac Risk Index (RCRI):  The patient has the following serious cardiovascular risks for perioperative complications:   - No serious cardiac risks = 0 points     RCRI Interpretation: 0 points: Class I (very low risk - 0.4% complication rate)           Signed Electronically by: Larisa Larson CNP  Copy of this evaluation report is provided to requesting physician.

## 2023-04-19 NOTE — PATIENT INSTRUCTIONS
Take your prep slowly, do not target.    Your labs are processing, I will release results to [email or web address] once they are back.    No tramadol or ibuprofen 3 days prior to colonoscopy.    Lets see you back in 3 months for follow-up, before then if anything comes up.

## 2023-04-20 ENCOUNTER — MYC MEDICAL ADVICE (OUTPATIENT)
Dept: INTERNAL MEDICINE | Facility: CLINIC | Age: 63
End: 2023-04-20
Payer: COMMERCIAL

## 2023-04-20 NOTE — TELEPHONE ENCOUNTER
To PCP to review - no cream prescribed for rashes reported.   Per chart, rashes not observed at visit yesterday.     Larisa, please advise.   Thank you,  Gita LIZ

## 2023-04-26 ENCOUNTER — MYC MEDICAL ADVICE (OUTPATIENT)
Dept: INTERNAL MEDICINE | Facility: CLINIC | Age: 63
End: 2023-04-26
Payer: COMMERCIAL

## 2023-04-26 DIAGNOSIS — F51.02 ADJUSTMENT INSOMNIA: ICD-10-CM

## 2023-04-26 RX ORDER — HYDROXYZINE HYDROCHLORIDE 10 MG/1
TABLET, FILM COATED ORAL
Qty: 90 TABLET | Refills: 3 | Status: SHIPPED | OUTPATIENT
Start: 2023-04-26 | End: 2023-10-02

## 2023-04-26 NOTE — TELEPHONE ENCOUNTER
"Last Written Prescription Date:  3/29/23 (pt now requesting 90 day fill)  Last Fill Quantity: 30,  # refills: 4   Last office visit provider:  4/19/23  Prescription approved per [facility] Refill Protocol.    Requested Prescriptions   Pending Prescriptions Disp Refills     hydrOXYzine (ATARAX) 10 MG tablet 90 tablet 3     Sig: TAKE 1 TO 1 AND 1/2 TABLETS BY MOUTH AT BEDTIME AS NEEDED.       Antihistamines Protocol Passed - 4/26/2023  1:37 PM        Passed - Recent (12 mo) or future (30 days) visit within the authorizing provider's specialty     Patient has had an office visit with the authorizing provider or a provider within the authorizing providers department within the previous 12 mos or has a future within next 30 days. See \"Patient Info\" tab in inbasket, or \"Choose Columns\" in Meds & Orders section of the refill encounter.              Passed - Patient is age 3 or older     Apply age and/or weight-based dosing for peds patients age 3 and older.    Forward request to provider for patients under the age of 3.          Passed - Medication is active on med list             Milagros Burns RN 04/26/23 1:38 PM  "

## 2023-05-24 ENCOUNTER — TRANSFERRED RECORDS (OUTPATIENT)
Dept: HEALTH INFORMATION MANAGEMENT | Facility: CLINIC | Age: 63
End: 2023-05-24
Payer: COMMERCIAL

## 2023-05-30 DIAGNOSIS — B00.1 COLD SORE: ICD-10-CM

## 2023-06-01 NOTE — TELEPHONE ENCOUNTER
"Routing refill request to provider for review/approval because:  A break in medication    Last Written Prescription Date:  5/25/22  Last Fill Quantity: 14,  # refills: 1   Last office visit provider:  4/19/23     Requested Prescriptions   Pending Prescriptions Disp Refills     valACYclovir (VALTREX) 1000 mg tablet 14 tablet 1     Sig: Take 1 tablet (1,000 mg) by mouth 2 times daily       Antivirals for Herpes Protocol Passed - 5/30/2023  7:26 PM        Passed - Patient is age 12 or older        Passed - Recent (12 mo) or future (30 days) visit within the authorizing provider's specialty     Patient has had an office visit with the authorizing provider or a provider within the authorizing providers department within the previous 12 mos or has a future within next 30 days. See \"Patient Info\" tab in inbasket, or \"Choose Columns\" in Meds & Orders section of the refill encounter.              Passed - Medication is active on med list        Passed - Normal serum creatinine on file in past 12 months     Recent Labs   Lab Test 02/14/23  1657   CR 0.82       Ok to refill medication if creatinine is low               Madhuri Thompson RN 06/01/23 10:00 AM  "

## 2023-06-02 ENCOUNTER — MYC MEDICAL ADVICE (OUTPATIENT)
Dept: INTERNAL MEDICINE | Facility: CLINIC | Age: 63
End: 2023-06-02
Payer: COMMERCIAL

## 2023-06-02 RX ORDER — VALACYCLOVIR HYDROCHLORIDE 1 G/1
1000 TABLET, FILM COATED ORAL 2 TIMES DAILY
Qty: 14 TABLET | Refills: 1 | Status: SHIPPED | OUTPATIENT
Start: 2023-06-02 | End: 2023-07-26

## 2023-06-06 ENCOUNTER — PATIENT OUTREACH (OUTPATIENT)
Dept: CARE COORDINATION | Facility: CLINIC | Age: 63
End: 2023-06-06
Payer: COMMERCIAL

## 2023-06-20 ENCOUNTER — PATIENT OUTREACH (OUTPATIENT)
Dept: CARE COORDINATION | Facility: CLINIC | Age: 63
End: 2023-06-20
Payer: COMMERCIAL

## 2023-07-05 DIAGNOSIS — E03.9 ACQUIRED HYPOTHYROIDISM: ICD-10-CM

## 2023-07-06 RX ORDER — LEVOTHYROXINE SODIUM 50 UG/1
50 TABLET ORAL DAILY
Qty: 90 TABLET | Refills: 3 | Status: SHIPPED | OUTPATIENT
Start: 2023-07-06 | End: 2024-08-06

## 2023-07-06 NOTE — TELEPHONE ENCOUNTER
"Routing refill request to provider for review/approval because:  Labs not current:  TSH    Last Written Prescription Date:  6/29/2022  Last Fill Quantity: 90,  # refills: 3   Last office visit provider:  4/19/2023     Requested Prescriptions   Pending Prescriptions Disp Refills     levothyroxine (SYNTHROID/LEVOTHROID) 50 MCG tablet 90 tablet 3     Sig: Take 1 tablet (50 mcg) by mouth daily       Thyroid Protocol Failed - 7/5/2023  7:12 PM        Failed - Normal TSH on file in past 12 months     Recent Labs   Lab Test 06/27/22  1025   TSH 0.96              Passed - Patient is 12 years or older        Passed - Recent (12 mo) or future (30 days) visit within the authorizing provider's specialty     Patient has had an office visit with the authorizing provider or a provider within the authorizing providers department within the previous 12 mos or has a future within next 30 days. See \"Patient Info\" tab in inbasket, or \"Choose Columns\" in Meds & Orders section of the refill encounter.              Passed - Medication is active on med list        Passed - No active pregnancy on record     If patient is pregnant or has had a positive pregnancy test, please check TSH.          Passed - No positive pregnancy test in past 12 months     If patient is pregnant or has had a positive pregnancy test, please check TSH.               Mariana Leslie RN 07/06/23 8:42 AM  "

## 2023-07-26 ENCOUNTER — MYC MEDICAL ADVICE (OUTPATIENT)
Dept: INTERNAL MEDICINE | Facility: CLINIC | Age: 63
End: 2023-07-26

## 2023-07-26 DIAGNOSIS — B00.1 COLD SORE: ICD-10-CM

## 2023-07-26 RX ORDER — VALACYCLOVIR HYDROCHLORIDE 1 G/1
1000 TABLET, FILM COATED ORAL 2 TIMES DAILY
Qty: 14 TABLET | Refills: 1 | Status: SHIPPED | OUTPATIENT
Start: 2023-07-26 | End: 2023-10-02

## 2023-07-26 NOTE — TELEPHONE ENCOUNTER
"Last Written Prescription Date:  6/2/2023  Last Fill Quantity: 14,  # refills: 1   Last office visit provider:  4/19/2023     Requested Prescriptions   Pending Prescriptions Disp Refills    valACYclovir (VALTREX) 1000 mg tablet 14 tablet 1     Sig: Take 1 tablet (1,000 mg) by mouth 2 times daily       Antivirals for Herpes Protocol Passed - 7/26/2023 12:09 PM        Passed - Patient is age 12 or older        Passed - Recent (12 mo) or future (30 days) visit within the authorizing provider's specialty     Patient has had an office visit with the authorizing provider or a provider within the authorizing providers department within the previous 12 mos or has a future within next 30 days. See \"Patient Info\" tab in inbasket, or \"Choose Columns\" in Meds & Orders section of the refill encounter.              Passed - Medication is active on med list        Passed - Normal serum creatinine on file in past 12 months     Recent Labs   Lab Test 02/14/23  1657   CR 0.82       Ok to refill medication if creatinine is low               Sherri Villalobos RN 07/26/23 12:10 PM  "

## 2023-08-12 ENCOUNTER — HEALTH MAINTENANCE LETTER (OUTPATIENT)
Age: 63
End: 2023-08-12

## 2023-08-19 DIAGNOSIS — F41.9 ANXIETY: ICD-10-CM

## 2023-08-23 RX ORDER — LORAZEPAM 0.5 MG/1
TABLET ORAL
Qty: 30 TABLET | Refills: 1 | Status: SHIPPED | OUTPATIENT
Start: 2023-08-23 | End: 2023-09-13

## 2023-09-12 ENCOUNTER — OFFICE VISIT (OUTPATIENT)
Dept: FAMILY MEDICINE | Facility: CLINIC | Age: 63
End: 2023-09-12
Payer: COMMERCIAL

## 2023-09-12 VITALS
DIASTOLIC BLOOD PRESSURE: 95 MMHG | TEMPERATURE: 97.9 F | BODY MASS INDEX: 30.65 KG/M2 | SYSTOLIC BLOOD PRESSURE: 160 MMHG | OXYGEN SATURATION: 97 % | WEIGHT: 187 LBS | HEART RATE: 68 BPM

## 2023-09-12 DIAGNOSIS — R30.0 DYSURIA: ICD-10-CM

## 2023-09-12 DIAGNOSIS — R10.9 RIGHT FLANK PAIN: Primary | ICD-10-CM

## 2023-09-12 LAB
ALBUMIN UR-MCNC: NEGATIVE MG/DL
APPEARANCE UR: CLEAR
BILIRUB UR QL STRIP: NEGATIVE
COLOR UR AUTO: YELLOW
GLUCOSE UR STRIP-MCNC: NEGATIVE MG/DL
HGB UR QL STRIP: NEGATIVE
KETONES UR STRIP-MCNC: NEGATIVE MG/DL
LEUKOCYTE ESTERASE UR QL STRIP: NEGATIVE
NITRATE UR QL: NEGATIVE
PH UR STRIP: 7 [PH] (ref 5–8)
SP GR UR STRIP: 1.01 (ref 1–1.03)
UROBILINOGEN UR STRIP-ACNC: 0.2 E.U./DL

## 2023-09-12 PROCEDURE — 81003 URINALYSIS AUTO W/O SCOPE: CPT | Performed by: PHYSICIAN ASSISTANT

## 2023-09-12 PROCEDURE — 99214 OFFICE O/P EST MOD 30 MIN: CPT | Performed by: PHYSICIAN ASSISTANT

## 2023-09-12 RX ORDER — OXYCODONE AND ACETAMINOPHEN 5; 325 MG/1; MG/1
1 TABLET ORAL EVERY 6 HOURS PRN
Qty: 3 TABLET | Refills: 0 | Status: SHIPPED | OUTPATIENT
Start: 2023-09-12 | End: 2023-09-13

## 2023-09-13 ENCOUNTER — TELEPHONE (OUTPATIENT)
Dept: INTERNAL MEDICINE | Facility: CLINIC | Age: 63
End: 2023-09-13

## 2023-09-13 ENCOUNTER — HOSPITAL ENCOUNTER (OUTPATIENT)
Dept: CT IMAGING | Facility: CLINIC | Age: 63
Discharge: HOME | End: 2023-09-13
Attending: NURSE PRACTITIONER | Admitting: NURSE PRACTITIONER
Payer: COMMERCIAL

## 2023-09-13 ENCOUNTER — OFFICE VISIT (OUTPATIENT)
Dept: INTERNAL MEDICINE | Facility: CLINIC | Age: 63
End: 2023-09-13
Payer: COMMERCIAL

## 2023-09-13 VITALS
DIASTOLIC BLOOD PRESSURE: 88 MMHG | BODY MASS INDEX: 26.84 KG/M2 | RESPIRATION RATE: 16 BRPM | OXYGEN SATURATION: 98 % | HEIGHT: 66 IN | WEIGHT: 167 LBS | TEMPERATURE: 98.1 F | SYSTOLIC BLOOD PRESSURE: 120 MMHG | HEART RATE: 66 BPM

## 2023-09-13 DIAGNOSIS — R10.9 RIGHT FLANK PAIN: ICD-10-CM

## 2023-09-13 DIAGNOSIS — S22.080A COMPRESSION FRACTURE OF T11 VERTEBRA, INITIAL ENCOUNTER (H): ICD-10-CM

## 2023-09-13 DIAGNOSIS — M62.830 BACK MUSCLE SPASM: ICD-10-CM

## 2023-09-13 DIAGNOSIS — N30.01 ACUTE CYSTITIS WITH HEMATURIA: ICD-10-CM

## 2023-09-13 DIAGNOSIS — F41.9 ANXIETY: ICD-10-CM

## 2023-09-13 LAB
ALBUMIN UR-MCNC: 100 MG/DL
APPEARANCE UR: CLEAR
BACTERIA #/AREA URNS HPF: ABNORMAL /HPF
BASOPHILS # BLD AUTO: 0.1 10E3/UL (ref 0–0.2)
BASOPHILS NFR BLD AUTO: 1 %
BILIRUB UR QL STRIP: NEGATIVE
COLOR UR AUTO: YELLOW
EOSINOPHIL # BLD AUTO: 0.1 10E3/UL (ref 0–0.7)
EOSINOPHIL NFR BLD AUTO: 1 %
ERYTHROCYTE [DISTWIDTH] IN BLOOD BY AUTOMATED COUNT: 13.3 % (ref 10–15)
GLUCOSE UR STRIP-MCNC: NEGATIVE MG/DL
HCT VFR BLD AUTO: 44.9 % (ref 35–47)
HGB BLD-MCNC: 15.3 G/DL (ref 11.7–15.7)
HGB UR QL STRIP: ABNORMAL
IMM GRANULOCYTES # BLD: 0 10E3/UL
IMM GRANULOCYTES NFR BLD: 0 %
KETONES UR STRIP-MCNC: NEGATIVE MG/DL
LEUKOCYTE ESTERASE UR QL STRIP: ABNORMAL
LYMPHOCYTES # BLD AUTO: 1.6 10E3/UL (ref 0.8–5.3)
LYMPHOCYTES NFR BLD AUTO: 13 %
MCH RBC QN AUTO: 30.2 PG (ref 26.5–33)
MCHC RBC AUTO-ENTMCNC: 34.1 G/DL (ref 31.5–36.5)
MCV RBC AUTO: 89 FL (ref 78–100)
MONOCYTES # BLD AUTO: 0.5 10E3/UL (ref 0–1.3)
MONOCYTES NFR BLD AUTO: 4 %
NEUTROPHILS # BLD AUTO: 10.1 10E3/UL (ref 1.6–8.3)
NEUTROPHILS NFR BLD AUTO: 82 %
NITRATE UR QL: POSITIVE
PH UR STRIP: 7 [PH] (ref 5–8)
PLATELET # BLD AUTO: 231 10E3/UL (ref 150–450)
RBC # BLD AUTO: 5.07 10E6/UL (ref 3.8–5.2)
RBC #/AREA URNS AUTO: ABNORMAL /HPF
SP GR UR STRIP: 1.02 (ref 1–1.03)
UROBILINOGEN UR STRIP-ACNC: 0.2 E.U./DL
WBC # BLD AUTO: 12.4 10E3/UL (ref 4–11)
WBC #/AREA URNS AUTO: ABNORMAL /HPF

## 2023-09-13 PROCEDURE — 99214 OFFICE O/P EST MOD 30 MIN: CPT | Performed by: NURSE PRACTITIONER

## 2023-09-13 PROCEDURE — 81001 URINALYSIS AUTO W/SCOPE: CPT | Performed by: NURSE PRACTITIONER

## 2023-09-13 PROCEDURE — 74176 CT ABD & PELVIS W/O CONTRAST: CPT

## 2023-09-13 PROCEDURE — 85025 COMPLETE CBC W/AUTO DIFF WBC: CPT | Performed by: NURSE PRACTITIONER

## 2023-09-13 PROCEDURE — 80053 COMPREHEN METABOLIC PANEL: CPT | Performed by: NURSE PRACTITIONER

## 2023-09-13 PROCEDURE — 36415 COLL VENOUS BLD VENIPUNCTURE: CPT | Performed by: NURSE PRACTITIONER

## 2023-09-13 PROCEDURE — 87086 URINE CULTURE/COLONY COUNT: CPT | Performed by: NURSE PRACTITIONER

## 2023-09-13 PROCEDURE — 87186 SC STD MICRODIL/AGAR DIL: CPT | Performed by: NURSE PRACTITIONER

## 2023-09-13 RX ORDER — LORAZEPAM 0.5 MG/1
0.5 TABLET ORAL 3 TIMES DAILY PRN
Qty: 30 TABLET | Refills: 1 | Status: SHIPPED | OUTPATIENT
Start: 2023-09-13 | End: 2024-08-06

## 2023-09-13 RX ORDER — OXYCODONE AND ACETAMINOPHEN 5; 325 MG/1; MG/1
1 TABLET ORAL EVERY 6 HOURS PRN
Qty: 20 TABLET | Refills: 0 | Status: SHIPPED | OUTPATIENT
Start: 2023-09-13 | End: 2023-11-07

## 2023-09-13 RX ORDER — OXYCODONE HYDROCHLORIDE 5 MG/1
5 TABLET ORAL EVERY 6 HOURS PRN
Qty: 12 TABLET | Refills: 0 | Status: CANCELLED | OUTPATIENT
Start: 2023-09-13 | End: 2023-09-16

## 2023-09-13 RX ORDER — ONDANSETRON 4 MG/1
4 TABLET, FILM COATED ORAL EVERY 8 HOURS PRN
Qty: 10 TABLET | Refills: 0 | Status: SHIPPED | OUTPATIENT
Start: 2023-09-13 | End: 2023-10-17

## 2023-09-13 ASSESSMENT — PATIENT HEALTH QUESTIONNAIRE - PHQ9
10. IF YOU CHECKED OFF ANY PROBLEMS, HOW DIFFICULT HAVE THESE PROBLEMS MADE IT FOR YOU TO DO YOUR WORK, TAKE CARE OF THINGS AT HOME, OR GET ALONG WITH OTHER PEOPLE: NOT DIFFICULT AT ALL
SUM OF ALL RESPONSES TO PHQ QUESTIONS 1-9: 1
SUM OF ALL RESPONSES TO PHQ QUESTIONS 1-9: 1

## 2023-09-13 ASSESSMENT — ENCOUNTER SYMPTOMS: BACK PAIN: 1

## 2023-09-13 NOTE — TELEPHONE ENCOUNTER
Return call placed to Radiology, patient has a compression fracture T11. Will update patient via phone call. Encounter closed.

## 2023-09-13 NOTE — PROGRESS NOTES
Patient presents with:  Back Pain: Low right sided back pain for the past 2 days. Frequency      Clinical Decision Making:  Right flank pain. Hx of nephrolithiasis. UA is normal today. Patient presenting too late for us to order CT through the [facility]. She has an appointment with her PCP tomorrow. I will prescribe a small mount of Percocet for pain management to tied her over until that appointment. She was directed to seek emergency medical attention if pain was unmanageable at home.       ICD-10-CM    1. Right flank pain  R10.9 oxyCODONE-acetaminophen (PERCOCET) 5-325 MG tablet      2. Dysuria  R30.0 UA Macroscopic with reflex to Microscopic and Culture - Clinic Collect          Patient Instructions   Continue taking Ibuprofen 600 mg every 6 hours.   You may take Percocet for break through pain. You can be taking Tylenol also, but do not exceed 4000 mg of Tylenol in a 24 hour period.   Your urine test is negative for signs of infection today.   Keep you follow up appointment with your primary care provider tomorrow. Seek emergency medical attention if symptoms are unmanageable through the night.     HPI:  Jonn Watson is a 63 year old female with PMHx of nephrolithiasis who presents today complaining right sided back pain x 2 days. Patient has also had some urinary frequency that also started today.     History obtained from the patient.    Problem List:  2023-03: Moderate episode of recurrent major depressive disorder (H)  2022-09: Adenoma of large intestine  2022-06: Benign neoplasm of descending colon  2022-06: Benign neoplasm of transverse colon  2022-06: Hemorrhage of rectum and anus  2022-06: Hemorrhoids  2022-06: Polyp of colon  2022-06: Acute lower GI bleeding  2022-05: Hiatal hernia  2022-05: Postmenopausal bleeding  2017-01: KVNG (generalized anxiety disorder)  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  Iron Deficiency  Arthropathy Of Multiple Sites  Fatigue  Midback Pain  Hypothyroidism  Hypertension  Arthropathy Of The  Ankle / Foot  Esophageal Reflux  Nephrolithiasis  Anxiety  Dizziness  Hiatal Hernia  Nontoxic Solitary Thyroid Nodule      Past Medical History:   Diagnosis Date    Anemia     Anxiety     Bladder infection     Bronchitis     Depression     Disease of thyroid gland     Endometriosis     GERD (gastroesophageal reflux disease)     Hiatal hernia     Hip pain     Right>Left    HL (hearing loss)     Hypertension     Kidney stone     Motion sickness        Social History     Tobacco Use    Smoking status: Some Days     Types: Cigarettes    Smokeless tobacco: Former    Tobacco comments:     Casual smoker by hx   Substance Use Topics    Alcohol use: No       Review of Systems    Vitals:    09/12/23 1930   BP: (!) 160/95   Pulse: 68   Temp: 97.9  F (36.6  C)   TempSrc: Oral   SpO2: 97%   Weight: 84.8 kg (187 lb)       Physical Exam  Vitals and nursing note reviewed.   Constitutional:       General: She is not in acute distress.     Appearance: She is not toxic-appearing or diaphoretic.   HENT:      Head: Normocephalic and atraumatic.      Right Ear: External ear normal.      Left Ear: External ear normal.   Eyes:      Conjunctiva/sclera: Conjunctivae normal.   Pulmonary:      Effort: Pulmonary effort is normal. No respiratory distress.   Abdominal:      Tenderness: There is right CVA tenderness.   Neurological:      Mental Status: She is alert.   Psychiatric:         Mood and Affect: Mood normal.         Behavior: Behavior normal.         Thought Content: Thought content normal.         Judgment: Judgment normal.         Results:  Results for orders placed or performed in visit on 09/12/23   UA Macroscopic with reflex to Microscopic and Culture - Clinic Collect     Status: Normal    Specimen: Urine, Clean Catch   Result Value Ref Range    Color Urine Yellow Colorless, Straw, Light Yellow, Yellow    Appearance Urine Clear Clear    Glucose Urine Negative Negative mg/dL    Bilirubin Urine Negative Negative    Ketones Urine  Negative Negative mg/dL    Specific Gravity Urine 1.015 1.005 - 1.030    Blood Urine Negative Negative    pH Urine 7.0 5.0 - 8.0    Protein Albumin Urine Negative Negative mg/dL    Urobilinogen Urine 0.2 0.2, 1.0 E.U./dL    Nitrite Urine Negative Negative    Leukocyte Esterase Urine Negative Negative    Narrative    Microscopic not indicated         At the end of the encounter, I discussed results, diagnosis, medications. Discussed red flags for immediate return to clinic/ER, as well as indications for follow up if no improvement. Patient understood and agreed to plan. Patient was stable for discharge.

## 2023-09-13 NOTE — PROGRESS NOTES
Assessment & Plan     Compression fracture of T11 vertebra, initial encounter (H): Return call placed to Radiology, she does indeed have an acute T11 compression fracture of her vertebrae. Called the patient and updated her of this. Neurosurgery referral placed. No further chiropractic care. Oxycodone as needed for pain control. Appointment made with Dr. Cox to address osteoporosis.   - Neurosurgery Referral    Right flank pain/Acute cystitis with hematuria: White count was mildly elevated (likely related to stress or pain). Urine showed infection, started on Macrobid for this. CT confirmed compression fracture, no stones. Discussed rest, Ice or heat, Icy hot or biofreeze topically as needed. Percocet as needed. Follow up if symptoms persist or worsen.   - UA Macroscopic with reflex to Microscopic and Culture - Lab Collect  - CBC with platelets and differential  - Comprehensive metabolic panel (BMP + Alb, Alk Phos, ALT, AST, Total. Bili, TP)  - oxyCODONE-acetaminophen (PERCOCET) 5-325 MG tablet  Dispense: 20 tablet; Refill: 0  - CBC with platelets and differential  - Comprehensive metabolic panel (BMP + Alb, Alk Phos, ALT, AST, Total. Bili, TP)  - UA Macroscopic with reflex to Microscopic and Culture - Lab Collect  - Urine Microscopic Exam  - Urine Culture  - nitroFURantoin macrocrystal-monohydrate (MACROBID) 100 MG capsule  Dispense: 10 capsule; Refill: 0    Back muscle spasm: Positive for muscle spasms with exam. Will start on Tizanidine.   - tiZANidine (ZANAFLEX) 4 MG tablet  Dispense: 30 tablet; Refill: 0    Anxiety: Refilled her PRN Lorazepam. She also continues on Sertraline. Stable.   - LORazepam (ATIVAN) 0.5 MG tablet  Dispense: 30 tablet; Refill: 1      Nicotine/Tobacco Cessation:  She reports that she has been smoking cigarettes. She has quit using smokeless tobacco.  Nicotine/Tobacco Cessation Plan:   Self help information given to patient      BMI:   Estimated body mass index is 27.37 kg/m  as  "calculated from the following:    Height as of this encounter: 1.664 m (5' 5.5\").    Weight as of this encounter: 75.8 kg (167 lb).   Weight management plan: Discussed healthy diet and exercise guidelines      MEGAN Toney [facility] LUANNE Lunsford is a 63 year old, presenting for the following health issues:  Back Pain (Middle back pain for 2 weeks- lifted mom. Urgent Care last night)        9/13/2023     2:10 PM   Additional Questions   Roomed by Liyah VAUGHAN       Back Pain        The patient presents today with concerns of right sided flank pain. This started a few days back. She is concerned that she may have a kidney stone, she has had these in the past, and this is how it felt.    She denies any increased urinary urgency, frequency, or burning. She has not seem any blood in her urine.    She does also report that she hurt her back when she was lifting her Mom. She has been seeing her local chiropractor to assist with this. Discussed with her history of osteoporosis, I would be more concerned for a possible compression fracture.    She denies a fever, chills, nausea, or vomiting.    Her Mom did pass away, condolences given for this.     Review of Systems   Musculoskeletal:  Positive for back pain.      Constitutional, HEENT, cardiovascular, pulmonary, GI, , musculoskeletal, neuro, skin, endocrine and psych systems are negative, except as otherwise noted.      Objective    /88 (BP Location: Right arm, Patient Position: Sitting)   Pulse 66   Temp 98.1  F (36.7  C)   Resp 16   Ht 1.664 m (5' 5.5\")   Wt 75.8 kg (167 lb)   SpO2 98%   BMI 27.37 kg/m    Body mass index is 27.37 kg/m .  Physical Exam  Vitals and nursing note reviewed.   Constitutional:       Appearance: Normal appearance. She is normal weight.   HENT:      Head: Normocephalic and atraumatic.   Cardiovascular:      Rate and Rhythm: Normal rate and regular rhythm.      Pulses: Normal pulses. "      Heart sounds: Normal heart sounds. No murmur heard.     No friction rub. No gallop.   Pulmonary:      Effort: Pulmonary effort is normal.   Abdominal:      General: Abdomen is flat. Bowel sounds are normal.      Palpations: Abdomen is soft.      Tenderness: There is no abdominal tenderness. There is right CVA tenderness. There is no left CVA tenderness, guarding or rebound. Negative signs include Berrios's sign, Rovsing's sign, McBurney's sign, psoas sign and obturator sign.      Hernia: No hernia is present.   Musculoskeletal:        Back:       Comments: Tender here on exam, muscle inflammation and swelling felt on exam   Skin:     General: Skin is warm and dry.      Capillary Refill: Capillary refill takes less than 2 seconds.   Neurological:      General: No focal deficit present.      Mental Status: She is alert and oriented to person, place, and time. Mental status is at baseline.   Psychiatric:         Mood and Affect: Mood normal.         Behavior: Behavior normal.         Thought Content: Thought content normal.         Judgment: Judgment normal.            Answers submitted by the patient for this visit:  Patient Health Questionnaire (Submitted on 9/13/2023)  If you checked off any problems, how difficult have these problems made it for you to do your work, take care of things at home, or get along with other people?: Not difficult at all  PHQ9 TOTAL SCORE: 1

## 2023-09-13 NOTE — PATIENT INSTRUCTIONS
Your labs are processing, I will release results to [facility] once they are back.    I did send an additional oxycodone for pain control.    I did refill your Zofran as needed for nausea.    Try the tizanidine or Zanaflex up to a full tablet 3 times a day as needed for muscle spasms.    I will see you back in 6 weeks for recheck, before then if anything comes up.    Rest, push fluids

## 2023-09-13 NOTE — PATIENT INSTRUCTIONS
Continue taking Ibuprofen 600 mg every 6 hours.   You may take Percocet for break through pain. You can be taking Tylenol also, but do not exceed 4000 mg of Tylenol in a 24 hour period.   Your urine test is negative for signs of infection today.   Keep you follow up appointment with your primary care provider tomorrow. Seek emergency medical attention if symptoms are unmanageable through the night.

## 2023-09-13 NOTE — TELEPHONE ENCOUNTER
Provider Communication    Who is calling:  Cristofer Radiologist    Facility in which provider is associated:  [facility]    Reason for call:  Pt's results     Okay to leave detailed message?:  No at Other phone number:  [phone]  *

## 2023-09-13 NOTE — RESULT ENCOUNTER NOTE
Called the patient, updated her of her T11 vertebral fracture, Neurosurgery referral placed. She has known osteoporosis, initial osteo consult placed with Dr. Cox here at [facility].

## 2023-09-14 ENCOUNTER — MYC MEDICAL ADVICE (OUTPATIENT)
Dept: INTERNAL MEDICINE | Facility: CLINIC | Age: 63
End: 2023-09-14
Payer: COMMERCIAL

## 2023-09-14 DIAGNOSIS — S22.089A CLOSED T11 FRACTURE (H): Primary | ICD-10-CM

## 2023-09-14 LAB
ALBUMIN SERPL BCG-MCNC: 4.6 G/DL (ref 3.5–5.2)
ALP SERPL-CCNC: 132 U/L (ref 35–104)
ALT SERPL W P-5'-P-CCNC: 27 U/L (ref 0–50)
ANION GAP SERPL CALCULATED.3IONS-SCNC: 11 MMOL/L (ref 7–15)
AST SERPL W P-5'-P-CCNC: 28 U/L (ref 0–45)
BILIRUB SERPL-MCNC: 0.3 MG/DL
BUN SERPL-MCNC: 12.5 MG/DL (ref 8–23)
CALCIUM SERPL-MCNC: 10 MG/DL (ref 8.8–10.2)
CHLORIDE SERPL-SCNC: 107 MMOL/L (ref 98–107)
CREAT SERPL-MCNC: 0.74 MG/DL (ref 0.51–0.95)
DEPRECATED HCO3 PLAS-SCNC: 25 MMOL/L (ref 22–29)
EGFRCR SERPLBLD CKD-EPI 2021: >90 ML/MIN/1.73M2
GLUCOSE SERPL-MCNC: 113 MG/DL (ref 70–99)
POTASSIUM SERPL-SCNC: 4.3 MMOL/L (ref 3.4–5.3)
PROT SERPL-MCNC: 7.6 G/DL (ref 6.4–8.3)
SODIUM SERPL-SCNC: 143 MMOL/L (ref 136–145)

## 2023-09-14 NOTE — PROGRESS NOTES
Jonn called back concerned about scheduling with a KEITH because she was told by her DrSrinivasa Whom make the referral that she will need surgery.  Jonn does not want to have to make two visits to see the surgeon.     Urgent referral reviewed with BRITTANY Bell.  Ordered XR prior to appt in clinic with KEITH.  Kamila Hurtado RN, CNRN

## 2023-09-16 LAB
BACTERIA UR CULT: ABNORMAL
BACTERIA UR CULT: ABNORMAL

## 2023-09-17 RX ORDER — NITROFURANTOIN 25; 75 MG/1; MG/1
100 CAPSULE ORAL 2 TIMES DAILY
Qty: 10 CAPSULE | Refills: 0 | Status: SHIPPED | OUTPATIENT
Start: 2023-09-17 | End: 2023-10-02

## 2023-09-19 ENCOUNTER — HOSPITAL ENCOUNTER (OUTPATIENT)
Dept: RADIOLOGY | Facility: HOSPITAL | Age: 63
Discharge: HOME | End: 2023-09-19
Attending: PHYSICIAN ASSISTANT | Admitting: PHYSICIAN ASSISTANT
Payer: COMMERCIAL

## 2023-09-19 DIAGNOSIS — S22.089A CLOSED T11 FRACTURE (H): ICD-10-CM

## 2023-09-19 PROCEDURE — 72080 X-RAY EXAM THORACOLMB 2/> VW: CPT

## 2023-09-21 ENCOUNTER — OFFICE VISIT (OUTPATIENT)
Dept: NEUROSURGERY | Facility: CLINIC | Age: 63
End: 2023-09-21
Attending: NURSE PRACTITIONER
Payer: COMMERCIAL

## 2023-09-21 VITALS
HEIGHT: 66 IN | SYSTOLIC BLOOD PRESSURE: 181 MMHG | OXYGEN SATURATION: 97 % | DIASTOLIC BLOOD PRESSURE: 85 MMHG | HEART RATE: 54 BPM | BODY MASS INDEX: 26.84 KG/M2 | WEIGHT: 167 LBS

## 2023-09-21 DIAGNOSIS — S22.080A COMPRESSION FRACTURE OF T11 VERTEBRA, INITIAL ENCOUNTER (H): Primary | ICD-10-CM

## 2023-09-21 PROCEDURE — 99203 OFFICE O/P NEW LOW 30 MIN: CPT | Performed by: SURGERY

## 2023-09-21 ASSESSMENT — PAIN SCALES - GENERAL: PAINLEVEL: MODERATE PAIN (5)

## 2023-09-21 NOTE — LETTER
9/21/2023         RE: Jonn Watson  [address] [place] [address]        Dear Colleague,    Thank you for referring your patient, Jonn Watson, to the [facility]. Please see a copy of my visit note below.    NEUROSURGERY CONSULTATION NOTE      Neurosurgery was asked to see this patient by Larisa Larson CNP for evaluation of compression fracture.       CONSULTATION ASSESSMENT AND PLAN:    64 yo female who presents with back pain. Imaging shows acute T11 compression fracture without retropulsion or canal stenosis. Was diagnosed weeks after injury. Has not been braced. Overall improving. She is fairly active and having difficulty knowning how low impact to be. Recommend off the shelf TLSO. She will follow up in clinic in 6 weeks with a xray. If stable at that time could wean the brace.     Millie Welch MD      HPI:  64 yo female who presents with back pain. Was lifting her mother from a commode and noted acute back pain following. Her mother was 140 pounds. This event happened 3.5 weeks ago. Right sided back pain is ongoing. Spasms are improved. Standing is better without severe pain. Last 4-5 days have improved but is also not doing anything physically. Denies lower extremity symptoms, saddle anesthesia or bowel or bladder dysfunction.     Past Medical History:   Diagnosis Date     Anemia      Anxiety      Bladder infection      Bronchitis      Depression      Disease of thyroid gland      Endometriosis      GERD (gastroesophageal reflux disease)      Hiatal hernia      Hip pain     Right>Left     HL (hearing loss)      Hypertension      Kidney stone      Motion sickness        Past Surgical History:   Procedure Laterality Date     APPENDECTOMY       ARTHRODESIS TOE(S) Left      COMBINED CYSTOSCOPY, INSERT STENT URETER(S) Left 11/11/2016    Procedure: CYSTOSCOPY, LEFT URETEROSCOPY, STENT INSERTION, STONE EXTRACTION;  Surgeon: Patric Isabel MD;   Location: [facility] OR;  Service:      CYSTOSCOPY W/ URETEROSCOPY       DILATION AND CURETTAGE, OPERATIVE HYSTEROSCOPY, COMBINED N/A 10/7/2021    Procedure: HYSTEROSCOPY, WITH DILATION AND CURETTAGE;  Surgeon: Norma Romero DO;  Location: [facility]     LAPAROSCOPY DIAGNOSTIC (GENERAL)      endometriosis     URETEROSCOPY         REVIEW OF SYSTEMS:   See ROS form under media     MEDICATIONS:    Current Outpatient Medications   Medication Sig Dispense Refill     atenolol (TENORMIN) 50 MG tablet Take 1 tablet (50 mg) by mouth daily 90 tablet 3     calcium carbonate-vitamin D3 (CALCIUM 600 WITH VITAMIN D3) 600 mg(1,500mg) -400 unit cap        diclofenac (VOLTAREN) 1 % topical gel APPLY SPARINGLY TO THE AFFECTED AREA ONCE DAILY.       hydrOXYzine (ATARAX) 10 MG tablet TAKE 1 TO 1 AND 1/2 TABLETS BY MOUTH AT BEDTIME AS NEEDED. 90 tablet 3     ibuprofen (ADVIL/MOTRIN) 600 MG tablet Take 1 tablet by mouth 3 times daily       levothyroxine (SYNTHROID/LEVOTHROID) 50 MCG tablet Take 1 tablet (50 mcg) by mouth daily 90 tablet 3     LORazepam (ATIVAN) 0.5 MG tablet Take 1 tablet (0.5 mg) by mouth 3 times daily as needed for anxiety 30 tablet 1     nitroFURantoin macrocrystal-monohydrate (MACROBID) 100 MG capsule Take 1 capsule (100 mg) by mouth 2 times daily 10 capsule 0     omeprazole (PRILOSEC OTC) 20 MG EC tablet Take 20 mg by mouth daily       ondansetron (ZOFRAN) 4 MG tablet Take 1 tablet (4 mg) by mouth every 8 hours as needed for nausea 10 tablet 0     oxyCODONE-acetaminophen (PERCOCET) 5-325 MG tablet Take 1 tablet by mouth every 6 hours as needed for pain 20 tablet 0     sertraline (ZOLOFT) 100 MG tablet TAKE 2 TABLETS(200 MG) BY MOUTH DAILY 180 tablet 3     tiZANidine (ZANAFLEX) 4 MG tablet Take 0.5-1 tablets (2-4 mg) by mouth 3 times daily as needed for muscle spasms 30 tablet 0     traMADol (ULTRAM) 50 MG tablet        triamcinolone (KENALOG) 0.5 % external ointment Apply 1 g topically 2 times  "daily 30 g 3     valACYclovir (VALTREX) 1000 mg tablet Take 1 tablet (1,000 mg) by mouth 2 times daily 14 tablet 1         ALLERGIES/SENSITIVITIES:     Allergies   Allergen Reactions     Erythromycin Base [Erythromycin] Anaphylaxis and Swelling     Sores in mouth also.     Lisinopril Cough     Losartan Other (See Comments)     General malaize     Sulfa (Sulfonamide Antibiotics) [Sulfa Antibiotics] Rash       PERTINENT SOCIAL HISTORY:   Social History     Socioeconomic History     Marital status: Single   Tobacco Use     Smoking status: Some Days     Types: Cigarettes     Smokeless tobacco: Former     Tobacco comments:     Casual smoker by hx   Vaping Use     Vaping Use: Never used   Substance and Sexual Activity     Alcohol use: No     Drug use: No     Sexual activity: Yes     Partners: Female   Social History Narrative    Single, homosexual, has partner for years. Smokes cigarettes, off and on. Non alcohol drinker. Works in the [employer] and does free [employer].         FAMILY HISTORY:  Family History   Problem Relation Age of Onset     Hypertension Mother      Breast Cancer Mother 73.00     Heart Disease Father      Hypertension Father      Aneurysm Sister      Hypertension Brother      Depression Brother      Hypertension Sister      Depression Sister      Hypertension Maternal Grandfather      Gout Paternal Grandmother      Hypertension Paternal Grandmother      Urolithiasis Paternal Grandfather         RECURRENT     Heart Disease Paternal Grandfather      Hypertension Paternal Grandfather         PHYSICAL EXAM:   Constitutional: BP (!) 181/85   Pulse 54   Ht 5' 5.5\" (1.664 m)   Wt 167 lb (75.8 kg)   SpO2 97%   BMI 27.37 kg/m       Mental Status: A & O in no acute distress.  Affect is appropriate.  Speech is fluent.  Recent and remote memory are intact.  Attention span and concentration are normal.     Motor: Normal bulk and tone all muscle groups of upper and lower extremities.    Strength: 5/5 x 4   "   Sensory: Sensation intact bilaterally to light touch.     Coordination: Heel/toe/tandem gait intact.  Normal gait and station.     Reflexes: supinator, biceps, triceps, knee/ ankle jerk intact 2+/4. No kent's    No tenderness to palpation over thoracolumbar junction.     IMAGING:  I personally reviewed all radiographic images         Cc:   Larisa Larson             Again, thank you for allowing me to participate in the care of your patient.        Sincerely,        Millie Welch MD

## 2023-09-21 NOTE — PROGRESS NOTES
NEUROSURGERY CONSULTATION NOTE      Neurosurgery was asked to see this patient by Larisa Larson CNP for evaluation of compression fracture.       CONSULTATION ASSESSMENT AND PLAN:    64 yo female who presents with back pain. Imaging shows acute T11 compression fracture without retropulsion or canal stenosis. Was diagnosed weeks after injury. Has not been braced. Overall improving. She is fairly active and having difficulty knowning how low impact to be. Recommend off the shelf TLSO. She will follow up in clinic in 6 weeks with a xray. If stable at that time could wean the brace.     Millie Welch MD      HPI:  64 yo female who presents with back pain. Was lifting her mother from a commode and noted acute back pain following. Her mother was 140 pounds. This event happened 3.5 weeks ago. Right sided back pain is ongoing. Spasms are improved. Standing is better without severe pain. Last 4-5 days have improved but is also not doing anything physically. Denies lower extremity symptoms, saddle anesthesia or bowel or bladder dysfunction.     Past Medical History:   Diagnosis Date    Anemia     Anxiety     Bladder infection     Bronchitis     Depression     Disease of thyroid gland     Endometriosis     GERD (gastroesophageal reflux disease)     Hiatal hernia     Hip pain     Right>Left    HL (hearing loss)     Hypertension     Kidney stone     Motion sickness        Past Surgical History:   Procedure Laterality Date    APPENDECTOMY      ARTHRODESIS TOE(S) Left     COMBINED CYSTOSCOPY, INSERT STENT URETER(S) Left 11/11/2016    Procedure: CYSTOSCOPY, LEFT URETEROSCOPY, STENT INSERTION, STONE EXTRACTION;  Surgeon: Patric Isabel MD;  Location: [facility];  Service:     CYSTOSCOPY W/ URETEROSCOPY      DILATION AND CURETTAGE, OPERATIVE HYSTEROSCOPY, COMBINED N/A 10/7/2021    Procedure: HYSTEROSCOPY, WITH DILATION AND CURETTAGE;  Surgeon: Norma Romero DO;  Location: [facility]     LAPAROSCOPY DIAGNOSTIC (GENERAL)      endometriosis    URETEROSCOPY         REVIEW OF SYSTEMS:   See ROS form under media     MEDICATIONS:    Current Outpatient Medications   Medication Sig Dispense Refill    atenolol (TENORMIN) 50 MG tablet Take 1 tablet (50 mg) by mouth daily 90 tablet 3    calcium carbonate-vitamin D3 (CALCIUM 600 WITH VITAMIN D3) 600 mg(1,500mg) -400 unit cap       diclofenac (VOLTAREN) 1 % topical gel APPLY SPARINGLY TO THE AFFECTED AREA ONCE DAILY.      hydrOXYzine (ATARAX) 10 MG tablet TAKE 1 TO 1 AND 1/2 TABLETS BY MOUTH AT BEDTIME AS NEEDED. 90 tablet 3    ibuprofen (ADVIL/MOTRIN) 600 MG tablet Take 1 tablet by mouth 3 times daily      levothyroxine (SYNTHROID/LEVOTHROID) 50 MCG tablet Take 1 tablet (50 mcg) by mouth daily 90 tablet 3    LORazepam (ATIVAN) 0.5 MG tablet Take 1 tablet (0.5 mg) by mouth 3 times daily as needed for anxiety 30 tablet 1    nitroFURantoin macrocrystal-monohydrate (MACROBID) 100 MG capsule Take 1 capsule (100 mg) by mouth 2 times daily 10 capsule 0    omeprazole (PRILOSEC OTC) 20 MG EC tablet Take 20 mg by mouth daily      ondansetron (ZOFRAN) 4 MG tablet Take 1 tablet (4 mg) by mouth every 8 hours as needed for nausea 10 tablet 0    oxyCODONE-acetaminophen (PERCOCET) 5-325 MG tablet Take 1 tablet by mouth every 6 hours as needed for pain 20 tablet 0    sertraline (ZOLOFT) 100 MG tablet TAKE 2 TABLETS(200 MG) BY MOUTH DAILY 180 tablet 3    tiZANidine (ZANAFLEX) 4 MG tablet Take 0.5-1 tablets (2-4 mg) by mouth 3 times daily as needed for muscle spasms 30 tablet 0    traMADol (ULTRAM) 50 MG tablet       triamcinolone (KENALOG) 0.5 % external ointment Apply 1 g topically 2 times daily 30 g 3    valACYclovir (VALTREX) 1000 mg tablet Take 1 tablet (1,000 mg) by mouth 2 times daily 14 tablet 1         ALLERGIES/SENSITIVITIES:     Allergies   Allergen Reactions    Erythromycin Base [Erythromycin] Anaphylaxis and Swelling     Sores in mouth also.    Lisinopril Cough     "Losartan Other (See Comments)     General malaize    Sulfa (Sulfonamide Antibiotics) [Sulfa Antibiotics] Rash       PERTINENT SOCIAL HISTORY:   Social History     Socioeconomic History    Marital status: Single   Tobacco Use    Smoking status: Some Days     Types: Cigarettes    Smokeless tobacco: Former    Tobacco comments:     Casual smoker by hx   Vaping Use    Vaping Use: Never used   Substance and Sexual Activity    Alcohol use: No    Drug use: No    Sexual activity: Yes     Partners: Female   Social History Narrative    Single, homosexual, has partner for years. Smokes cigarettes, off and on. Non alcohol drinker. Works in the [employer] and does free [employer].         FAMILY HISTORY:  Family History   Problem Relation Age of Onset    Hypertension Mother     Breast Cancer Mother 73.00    Heart Disease Father     Hypertension Father     Aneurysm Sister     Hypertension Brother     Depression Brother     Hypertension Sister     Depression Sister     Hypertension Maternal Grandfather     Gout Paternal Grandmother     Hypertension Paternal Grandmother     Urolithiasis Paternal Grandfather         RECURRENT    Heart Disease Paternal Grandfather     Hypertension Paternal Grandfather         PHYSICAL EXAM:   Constitutional: BP (!) 181/85   Pulse 54   Ht 5' 5.5\" (1.664 m)   Wt 167 lb (75.8 kg)   SpO2 97%   BMI 27.37 kg/m       Mental Status: A & O in no acute distress.  Affect is appropriate.  Speech is fluent.  Recent and remote memory are intact.  Attention span and concentration are normal.     Motor: Normal bulk and tone all muscle groups of upper and lower extremities.    Strength: 5/5 x 4     Sensory: Sensation intact bilaterally to light touch.     Coordination: Heel/toe/tandem gait intact.  Normal gait and station.     Reflexes: supinator, biceps, triceps, knee/ ankle jerk intact 2+/4. No kent's    No tenderness to palpation over thoracolumbar junction.     IMAGING:  I personally reviewed all " radiographic images         Cc:   Larisa Larson

## 2023-09-21 NOTE — NURSING NOTE
"Jonn Watson is a 63 year old female who presents for:  Chief Complaint   Patient presents with    Consult     Injury while providing cares for mother  On lifting, pivoting, felt pain and injured back  Right side back pain  Went in, got CT, discovered T11 fracture          Initial Vitals:  BP (!) 181/85   Pulse 54   Ht 5' 5.5\" (1.664 m)   Wt 167 lb (75.8 kg)   SpO2 97%   BMI 27.37 kg/m   Estimated body mass index is 27.37 kg/m  as calculated from the following:    Height as of this encounter: 5' 5.5\" (1.664 m).    Weight as of this encounter: 167 lb (75.8 kg).. Body surface area is 1.87 meters squared. BP completed using cuff size: regular  Moderate Pain (5)    Nursing Comments: Jonn to follow up with Primary Care provider regarding elevated blood pressure.      Lopez Sparks  "

## 2023-09-21 NOTE — PATIENT INSTRUCTIONS
Recommend off the shelf TLSO.  Please follow up in clinic in 6 weeks with a xray with KEITH on Dr. Welch clinic day.

## 2023-10-02 ENCOUNTER — OFFICE VISIT (OUTPATIENT)
Dept: INTERNAL MEDICINE | Facility: CLINIC | Age: 63
End: 2023-10-02
Payer: COMMERCIAL

## 2023-10-02 VITALS
RESPIRATION RATE: 14 BRPM | DIASTOLIC BLOOD PRESSURE: 76 MMHG | SYSTOLIC BLOOD PRESSURE: 124 MMHG | TEMPERATURE: 98.3 F | HEIGHT: 66 IN | WEIGHT: 165.5 LBS | OXYGEN SATURATION: 96 % | BODY MASS INDEX: 26.6 KG/M2 | HEART RATE: 63 BPM

## 2023-10-02 DIAGNOSIS — F41.9 ANXIETY: ICD-10-CM

## 2023-10-02 DIAGNOSIS — B00.1 COLD SORE: ICD-10-CM

## 2023-10-02 DIAGNOSIS — Z13.29 SCREENING FOR ENDOCRINE, NUTRITIONAL, METABOLIC AND IMMUNITY DISORDER: ICD-10-CM

## 2023-10-02 DIAGNOSIS — Z13.21 SCREENING FOR ENDOCRINE, NUTRITIONAL, METABOLIC AND IMMUNITY DISORDER: ICD-10-CM

## 2023-10-02 DIAGNOSIS — I10 ESSENTIAL HYPERTENSION: ICD-10-CM

## 2023-10-02 DIAGNOSIS — Z13.0 SCREENING FOR ENDOCRINE, NUTRITIONAL, METABOLIC AND IMMUNITY DISORDER: ICD-10-CM

## 2023-10-02 DIAGNOSIS — E66.3 OVERWEIGHT (BMI 25.0-29.9): ICD-10-CM

## 2023-10-02 DIAGNOSIS — Z13.228 SCREENING FOR ENDOCRINE, NUTRITIONAL, METABOLIC AND IMMUNITY DISORDER: ICD-10-CM

## 2023-10-02 DIAGNOSIS — S22.080D COMPRESSION FRACTURE OF T11 VERTEBRA WITH ROUTINE HEALING, SUBSEQUENT ENCOUNTER: ICD-10-CM

## 2023-10-02 DIAGNOSIS — E03.9 ACQUIRED HYPOTHYROIDISM: ICD-10-CM

## 2023-10-02 DIAGNOSIS — F51.02 ADJUSTMENT INSOMNIA: ICD-10-CM

## 2023-10-02 LAB
ALBUMIN SERPL BCG-MCNC: 4.6 G/DL (ref 3.5–5.2)
ALP SERPL-CCNC: 125 U/L (ref 35–104)
ALT SERPL W P-5'-P-CCNC: 26 U/L (ref 0–50)
ANION GAP SERPL CALCULATED.3IONS-SCNC: 12 MMOL/L (ref 7–15)
AST SERPL W P-5'-P-CCNC: 31 U/L (ref 0–45)
BASO+EOS+MONOS # BLD AUTO: NORMAL 10*3/UL
BASO+EOS+MONOS NFR BLD AUTO: NORMAL %
BASOPHILS # BLD AUTO: 0.1 10E3/UL (ref 0–0.2)
BASOPHILS NFR BLD AUTO: 1 %
BILIRUB SERPL-MCNC: 0.3 MG/DL
BUN SERPL-MCNC: 18.6 MG/DL (ref 8–23)
CALCIUM SERPL-MCNC: 9.8 MG/DL (ref 8.8–10.2)
CHLORIDE SERPL-SCNC: 108 MMOL/L (ref 98–107)
CHOLEST SERPL-MCNC: 276 MG/DL
CREAT SERPL-MCNC: 0.78 MG/DL (ref 0.51–0.95)
DEPRECATED HCO3 PLAS-SCNC: 23 MMOL/L (ref 22–29)
EGFRCR SERPLBLD CKD-EPI 2021: 86 ML/MIN/1.73M2
EOSINOPHIL # BLD AUTO: 0.2 10E3/UL (ref 0–0.7)
EOSINOPHIL NFR BLD AUTO: 3 %
ERYTHROCYTE [DISTWIDTH] IN BLOOD BY AUTOMATED COUNT: 13.8 % (ref 10–15)
GLUCOSE SERPL-MCNC: 94 MG/DL (ref 70–99)
HBA1C MFR BLD: 5.7 % (ref 0–5.6)
HCT VFR BLD AUTO: 41.2 % (ref 35–47)
HDLC SERPL-MCNC: 47 MG/DL
HGB BLD-MCNC: 13.7 G/DL (ref 11.7–15.7)
IMM GRANULOCYTES # BLD: 0 10E3/UL
IMM GRANULOCYTES NFR BLD: 0 %
LDLC SERPL CALC-MCNC: 169 MG/DL
LYMPHOCYTES # BLD AUTO: 1.7 10E3/UL (ref 0.8–5.3)
LYMPHOCYTES NFR BLD AUTO: 22 %
MCH RBC QN AUTO: 30.2 PG (ref 26.5–33)
MCHC RBC AUTO-ENTMCNC: 33.3 G/DL (ref 31.5–36.5)
MCV RBC AUTO: 91 FL (ref 78–100)
MONOCYTES # BLD AUTO: 0.5 10E3/UL (ref 0–1.3)
MONOCYTES NFR BLD AUTO: 6 %
NEUTROPHILS # BLD AUTO: 5.4 10E3/UL (ref 1.6–8.3)
NEUTROPHILS NFR BLD AUTO: 69 %
NONHDLC SERPL-MCNC: 229 MG/DL
PLATELET # BLD AUTO: 173 10E3/UL (ref 150–450)
POTASSIUM SERPL-SCNC: 3.9 MMOL/L (ref 3.4–5.3)
PROT SERPL-MCNC: 7.7 G/DL (ref 6.4–8.3)
RBC # BLD AUTO: 4.54 10E6/UL (ref 3.8–5.2)
SODIUM SERPL-SCNC: 143 MMOL/L (ref 135–145)
TRIGL SERPL-MCNC: 299 MG/DL
TSH SERPL DL<=0.005 MIU/L-ACNC: 0.46 UIU/ML (ref 0.3–4.2)
WBC # BLD AUTO: 7.9 10E3/UL (ref 4–11)

## 2023-10-02 PROCEDURE — 36415 COLL VENOUS BLD VENIPUNCTURE: CPT | Performed by: NURSE PRACTITIONER

## 2023-10-02 PROCEDURE — 99214 OFFICE O/P EST MOD 30 MIN: CPT | Mod: 25 | Performed by: NURSE PRACTITIONER

## 2023-10-02 PROCEDURE — 90471 IMMUNIZATION ADMIN: CPT | Performed by: NURSE PRACTITIONER

## 2023-10-02 PROCEDURE — 80061 LIPID PANEL: CPT | Performed by: NURSE PRACTITIONER

## 2023-10-02 PROCEDURE — 90677 PCV20 VACCINE IM: CPT | Performed by: NURSE PRACTITIONER

## 2023-10-02 PROCEDURE — 85025 COMPLETE CBC W/AUTO DIFF WBC: CPT | Performed by: NURSE PRACTITIONER

## 2023-10-02 PROCEDURE — 83036 HEMOGLOBIN GLYCOSYLATED A1C: CPT | Performed by: NURSE PRACTITIONER

## 2023-10-02 PROCEDURE — 80053 COMPREHEN METABOLIC PANEL: CPT | Performed by: NURSE PRACTITIONER

## 2023-10-02 PROCEDURE — 84443 ASSAY THYROID STIM HORMONE: CPT | Performed by: NURSE PRACTITIONER

## 2023-10-02 RX ORDER — VALACYCLOVIR HYDROCHLORIDE 1 G/1
1000 TABLET, FILM COATED ORAL 2 TIMES DAILY
Qty: 14 TABLET | Refills: 1 | Status: SHIPPED | OUTPATIENT
Start: 2023-10-02 | End: 2023-12-20

## 2023-10-02 RX ORDER — TRAZODONE HYDROCHLORIDE 50 MG/1
25-50 TABLET, FILM COATED ORAL AT BEDTIME
Qty: 90 TABLET | Refills: 3 | Status: SHIPPED | OUTPATIENT
Start: 2023-10-02 | End: 2023-11-07 | Stop reason: SINTOL

## 2023-10-02 RX ORDER — HYDROXYZINE HYDROCHLORIDE 10 MG/1
TABLET, FILM COATED ORAL
Qty: 90 TABLET | Refills: 3 | Status: SHIPPED | OUTPATIENT
Start: 2023-10-02

## 2023-10-02 ASSESSMENT — ENCOUNTER SYMPTOMS
BACK PAIN: 1
HYPERTENSION: 1

## 2023-10-02 NOTE — PROGRESS NOTES
Assessment & Plan     Essential hypertension: Blood pressure today was 124/76. She continues on Atenolol, stable.     Cold sore: Active currently, refilled her Valtrex.   - valACYclovir (VALTREX) 1000 mg tablet  Dispense: 14 tablet; Refill: 1    Acquired hypothyroidism: She continues on Levothyroxine, stable.  - TSH WITH FREE T4 REFLEX    Anxiety: Improving, she continues on Sertraline. Stable.     Adjustment insomnia: Will try Trazodone, PRN Atarax for itching.   - traZODone (DESYREL) 50 MG tablet  Dispense: 90 tablet; Refill: 3  - hydrOXYzine (ATARAX) 10 MG tablet  Dispense: 90 tablet; Refill: 3    Overweight (BMI 25.0-29.9): Continue working on diet and exercise. Sent home with the Cardiometabolic lifestyle plan.     Compression fracture of T11 vertebra with routine healing, subsequent encounter: Pain has improved significantly.     Screening for endocrine, nutritional, metabolic and immunity disorder  - CBC with platelets and differential  - Comprehensive metabolic panel (BMP + Alb, Alk Phos, ALT, AST, Total. Bili, TP)  - Lipid panel reflex to direct LDL Fasting  - Hemoglobin A1c      Larisa Larson CNP  [facility]    Melinda Lunsford is a 63 year old, presenting for the following health issues:  Hypertension, Hyperlipidemia, and Back Pain        10/2/2023     2:44 PM   Additional Questions   Roomed by Waleska       History of Present Illness       Back Pain:  She presents for follow up of back pain. Patient's back pain is a new problem.    Original cause of back pain: other  First noticed back pain: in the last week  Patient feels back pain: dailyLocation of back pain:  Right middle of back and left middle of back  Description of back pain: other  Back pain spreads: nowhere    Since patient first noticed back pain, pain is: gradually improving  Does back pain interfere with her job:  No  On a scale of 1-10 (10 being the worst), patient describes pain as:  4  What makes  back pain worse: lying down   Acupuncture: not tried  Acetaminophen: not tried  Activity or exercise: not tried  Chiropractor:  Not tried  Cold: helpful  Heat: helpful  Massage: not tried  Muscle relaxants: not helpful  NSAIDS: helpful  Opioids: helpful  Physical Therapy: not tried  Rest: helpful  Steroid Injection: not tried  Stretching: not tried  Surgery: not tried  TENS unit: not tried  Topical pain relievers: helpful  Other healthcare providers patient is seeing for back pain: None    She eats 2-3 servings of fruits and vegetables daily.She consumes 1 sweetened beverage(s) daily.She exercises with enough effort to increase her heart rate 20 to 29 minutes per day.  She exercises with enough effort to increase her heart rate 5 days per week.   She is taking medications regularly.     Hyperlipidemia Follow-Up    Are you regularly taking any medication or supplement to lower your cholesterol?   No  Are you having muscle aches or other side effects that you think could be caused by your cholesterol lowering medication?  N/A    Hypertension Follow-up    Do you check your blood pressure regularly outside of the clinic? Yes, occasionally  Are you following a low salt diet? Yes  Are your blood pressures ever more than 140 on the top number (systolic) OR more   than 90 on the bottom number (diastolic), for example 140/90? Yes    The patient presents today for follow up of her blood pressure, cholesterol, and T11 compression fracture.    Her back pain has been better since starting on meds; she is starting to slowly move more. It does hurt more at night time.    She reports that she is still having trouble sleeping. Will try some trazodone.    She is itching again, arms and legs. Discussed trying pepcid twice daily x 14 days, and Zyrtec.    She could also try the Hydroxyzine, will refill this today.    Discussed starting the cardiometabolic lifestyle, and checking fasting labs going forward.    Blood pressures are within  "goal.     She has been dealing with the loss of her Mom well.     Review of Systems   Musculoskeletal:  Positive for back pain.      Constitutional, HEENT, cardiovascular, pulmonary, GI, , musculoskeletal, neuro, skin, endocrine and psych systems are negative, except as otherwise noted.      Objective    /76 (BP Location: Right arm, Patient Position: Sitting, Cuff Size: Adult Large)   Pulse 63   Temp 98.3  F (36.8  C) (Oral)   Resp 14   Ht 1.664 m (5' 5.5\")   Wt 75.1 kg (165 lb 8 oz)   LMP  (LMP Unknown)   SpO2 96%   BMI 27.12 kg/m    Body mass index is 27.12 kg/m .  Physical Exam   GENERAL: healthy, alert and no distress  EYES: Eyes grossly normal to inspection, PERRL and conjunctivae and sclerae normal  RESP: lungs clear to auscultation - no rales, rhonchi or wheezes  CV: regular rate and rhythm, normal S1 S2, no S3 or S4, no murmur, click or rub, no peripheral edema  MS: no gross musculoskeletal defects noted  SKIN: no suspicious lesions or rashes  NEURO: Normal strength and tone, mentation intact and speech normal  PSYCH: mentation appears normal, affect normal/bright              "

## 2023-10-02 NOTE — PATIENT INSTRUCTIONS
For the itching get back on Pepcid, 20 mg twice a day for 2 weeks.  Start a daily cetirizine or Zyrtec for the allergy portion.    Continue omeprazole as is.    At bedtime I have refilled your Atarax, is also okay to take trazodone 1/2 to 1 tablet (25 to 50 mg) at bedtime.    Future fasting labs.    We gave you your pneumonia shot 20 shot today.    See you back in 3 months for recheck, please let me know before then if anything comes up.    Cardiometabolic lifestyle as attached.

## 2023-10-17 ENCOUNTER — OFFICE VISIT (OUTPATIENT)
Dept: INTERNAL MEDICINE | Facility: CLINIC | Age: 63
End: 2023-10-17
Payer: COMMERCIAL

## 2023-10-17 VITALS
BODY MASS INDEX: 27.18 KG/M2 | RESPIRATION RATE: 16 BRPM | HEART RATE: 67 BPM | WEIGHT: 169.1 LBS | SYSTOLIC BLOOD PRESSURE: 133 MMHG | HEIGHT: 66 IN | DIASTOLIC BLOOD PRESSURE: 70 MMHG | OXYGEN SATURATION: 98 %

## 2023-10-17 DIAGNOSIS — Z87.81 H/O FRACTURE OF RIB: ICD-10-CM

## 2023-10-17 DIAGNOSIS — S72.002D CLOSED FRACTURE OF LEFT HIP WITH ROUTINE HEALING, SUBSEQUENT ENCOUNTER: ICD-10-CM

## 2023-10-17 DIAGNOSIS — F41.1 ANXIETY STATE: ICD-10-CM

## 2023-10-17 DIAGNOSIS — K21.00 GASTROESOPHAGEAL REFLUX DISEASE WITH ESOPHAGITIS WITHOUT HEMORRHAGE: ICD-10-CM

## 2023-10-17 DIAGNOSIS — M81.8 OSTEOPOROSIS, IDIOPATHIC: Primary | ICD-10-CM

## 2023-10-17 DIAGNOSIS — S62.102A WRIST FRACTURE, LEFT, CLOSED, INITIAL ENCOUNTER: ICD-10-CM

## 2023-10-17 DIAGNOSIS — E03.9 ACQUIRED HYPOTHYROIDISM: ICD-10-CM

## 2023-10-17 DIAGNOSIS — N20.0 CALCULUS OF KIDNEY: ICD-10-CM

## 2023-10-17 DIAGNOSIS — S22.080D CLOSED WEDGE COMPRESSION FRACTURE OF T11 VERTEBRA WITH ROUTINE HEALING, SUBSEQUENT ENCOUNTER: ICD-10-CM

## 2023-10-17 PROBLEM — K92.2 ACUTE LOWER GI BLEEDING: Status: RESOLVED | Noted: 2022-06-21 | Resolved: 2023-10-17

## 2023-10-17 PROBLEM — N95.0 POSTMENOPAUSAL BLEEDING: Status: RESOLVED | Noted: 2022-05-16 | Resolved: 2023-10-17

## 2023-10-17 LAB
ALBUMIN SERPL BCG-MCNC: 4.6 G/DL (ref 3.5–5.2)
ALP SERPL-CCNC: 113 U/L (ref 35–104)
ALT SERPL W P-5'-P-CCNC: 23 U/L (ref 0–50)
ANION GAP SERPL CALCULATED.3IONS-SCNC: 11 MMOL/L (ref 7–15)
AST SERPL W P-5'-P-CCNC: 28 U/L (ref 0–45)
BILIRUB SERPL-MCNC: 0.2 MG/DL
BUN SERPL-MCNC: 13.2 MG/DL (ref 8–23)
CALCIUM SERPL-MCNC: 9.6 MG/DL (ref 8.8–10.2)
CHLORIDE SERPL-SCNC: 107 MMOL/L (ref 98–107)
CREAT SERPL-MCNC: 0.74 MG/DL (ref 0.51–0.95)
DEPRECATED HCO3 PLAS-SCNC: 24 MMOL/L (ref 22–29)
EGFRCR SERPLBLD CKD-EPI 2021: >90 ML/MIN/1.73M2
ERYTHROCYTE [DISTWIDTH] IN BLOOD BY AUTOMATED COUNT: 14 % (ref 10–15)
GLUCOSE SERPL-MCNC: 94 MG/DL (ref 70–99)
HCT VFR BLD AUTO: 40 % (ref 35–47)
HGB BLD-MCNC: 13.5 G/DL (ref 11.7–15.7)
MAGNESIUM SERPL-MCNC: 2 MG/DL (ref 1.7–2.3)
MCH RBC QN AUTO: 30.4 PG (ref 26.5–33)
MCHC RBC AUTO-ENTMCNC: 33.8 G/DL (ref 31.5–36.5)
MCV RBC AUTO: 90 FL (ref 78–100)
PLATELET # BLD AUTO: 161 10E3/UL (ref 150–450)
POTASSIUM SERPL-SCNC: 3.9 MMOL/L (ref 3.4–5.3)
PROT SERPL-MCNC: 7.4 G/DL (ref 6.4–8.3)
PTH-INTACT SERPL-MCNC: 40 PG/ML (ref 15–65)
RBC # BLD AUTO: 4.44 10E6/UL (ref 3.8–5.2)
SODIUM SERPL-SCNC: 142 MMOL/L (ref 135–145)
TOTAL PROTEIN SERUM FOR ELP: 6.7 G/DL (ref 6.4–8.3)
TSH SERPL DL<=0.005 MIU/L-ACNC: 0.68 UIU/ML (ref 0.3–4.2)
VIT D+METAB SERPL-MCNC: 29 NG/ML (ref 20–50)
WBC # BLD AUTO: 7.6 10E3/UL (ref 4–11)

## 2023-10-17 PROCEDURE — 83970 ASSAY OF PARATHORMONE: CPT | Performed by: INTERNAL MEDICINE

## 2023-10-17 PROCEDURE — 82306 VITAMIN D 25 HYDROXY: CPT | Performed by: INTERNAL MEDICINE

## 2023-10-17 PROCEDURE — 84166 PROTEIN E-PHORESIS/URINE/CSF: CPT | Performed by: PATHOLOGY

## 2023-10-17 PROCEDURE — 83735 ASSAY OF MAGNESIUM: CPT | Performed by: INTERNAL MEDICINE

## 2023-10-17 PROCEDURE — 85027 COMPLETE CBC AUTOMATED: CPT | Performed by: INTERNAL MEDICINE

## 2023-10-17 PROCEDURE — 84165 PROTEIN E-PHORESIS SERUM: CPT | Performed by: PATHOLOGY

## 2023-10-17 PROCEDURE — 84080 ASSAY ALKALINE PHOSPHATASES: CPT | Mod: 90 | Performed by: INTERNAL MEDICINE

## 2023-10-17 PROCEDURE — 36415 COLL VENOUS BLD VENIPUNCTURE: CPT | Performed by: INTERNAL MEDICINE

## 2023-10-17 PROCEDURE — 86335 IMMUNFIX E-PHORSIS/URINE/CSF: CPT | Performed by: PATHOLOGY

## 2023-10-17 PROCEDURE — 80053 COMPREHEN METABOLIC PANEL: CPT | Performed by: INTERNAL MEDICINE

## 2023-10-17 PROCEDURE — 84155 ASSAY OF PROTEIN SERUM: CPT | Mod: 59 | Performed by: INTERNAL MEDICINE

## 2023-10-17 PROCEDURE — 84443 ASSAY THYROID STIM HORMONE: CPT | Performed by: INTERNAL MEDICINE

## 2023-10-17 PROCEDURE — 99000 SPECIMEN HANDLING OFFICE-LAB: CPT | Performed by: INTERNAL MEDICINE

## 2023-10-17 PROCEDURE — 99214 OFFICE O/P EST MOD 30 MIN: CPT | Performed by: INTERNAL MEDICINE

## 2023-10-17 PROCEDURE — 86364 TISS TRNSGLTMNASE EA IG CLAS: CPT | Performed by: INTERNAL MEDICINE

## 2023-10-17 NOTE — PROGRESS NOTES
Jonn Watson was seen today for management of osteoporosis.  The last bone density scan was done on 11/23/22:  Lumbar Spine: L1-L4: BMD: 0.993 g/cm2. T-score: -1.6. Z-score: -0.3  RIGHT Hip Total: BMD: 0.725 g/cm2. T-score: -2.2. Z-score: -1.3  RIGHT Hip Femoral neck: BMD: 0.691 g/cm2. T-score: -2.5. Z-score: -1.2    COMPARISON: There has been a 2.1% decrease in lumbar spine BMD. There has been a 1.1% decrease in right hip BMD.     Patient was not treated in the past with osteoporosis medications.  She has h/o multiple fractures:  Left hip fracture in 2018.  Left wrist fracture 2 years ago.  2 ribs fractured in 2022.  T11 compression fracture in 9/2023. She saw Neurosurgery Dr Welch, recommended benitez TLSO.    Social history:  reports that she has been smoking cigarettes. She has been exposed to tobacco smoke. She has quit using smokeless tobacco. She reports that she does not drink alcohol and does not use drugs.    Past medical history:   Patient Active Problem List   Diagnosis    Iron Deficiency    Hypothyroidism    Hypertension    Esophageal Reflux    Nephrolithiasis    Anxiety    Hiatal Hernia    Nontoxic Solitary Thyroid Nodule    KVNG (generalized anxiety disorder)    Hiatal hernia    Right cornea scar    Adenoma of large intestine    Benign neoplasm of descending colon    Benign neoplasm of transverse colon    Hemorrhage of rectum and anus    Hemorrhoids    Polyp of colon    Moderate episode of recurrent major depressive disorder (H)     FH: mother had osteoporosis and vertebral fractures, and breast cancer       Family History   Problem Relation Age of Onset    Hypertension Mother     Breast Cancer Mother 73.00    Heart Disease Father     Hypertension Father     Aneurysm Sister     Hypertension Brother     Depression Brother     Hypertension Sister     Depression Sister     Hypertension Maternal Grandfather     Gout Paternal Grandmother     Hypertension Paternal Grandmother     Urolithiasis Paternal  "Grandfather         RECURRENT    Heart Disease Paternal Grandfather     Hypertension Paternal Grandfather        Diet and supplements: Viactive twice daily.     Risk factors for osteoporosis/osteoporotic fracture:  Medications affecting bone health - no  Family history of osteoporosis/parental hip fracture - yes  Kidney stones or hypercalcemia/hypercalciuria - yes  Relevant gastrointestinal surgery or malabsorption - no  Weight loss - no  Tobacco use - no  Alcohol use - no  Hypogonadism - no  Transplantation - no  Hyperthyroidism - on levothyroxine  Rheumatoid arthritis - no  Type 2 diabetes mellitus - no  Chronic Kidney disease - no  Other - no    Gynecologic history: menopause mid 50s, no HRT    Laboratory testing:   Component      Latest Ref Rng 10/2/2023  3:45 PM   Sodium      135 - 145 mmol/L 143    Potassium      3.4 - 5.3 mmol/L 3.9    Carbon Dioxide (CO2)      22 - 29 mmol/L 23    Anion Gap      7 - 15 mmol/L 12    Urea Nitrogen      8.0 - 23.0 mg/dL 18.6    Creatinine      0.51 - 0.95 mg/dL 0.78    GFR Estimate      >60 mL/min/1.73m2 86    Calcium      8.8 - 10.2 mg/dL 9.8    Chloride      98 - 107 mmol/L 108 (H)    Glucose      70 - 99 mg/dL 94    Alkaline Phosphatase      35 - 104 U/L 125 (H)    AST      0 - 45 U/L 31    ALT      0 - 50 U/L 26    Protein Total      6.4 - 8.3 g/dL 7.7    Albumin      3.5 - 5.2 g/dL 4.6    Bilirubin Total      <=1.2 mg/dL 0.3           ROS:     Constitutional: Negative.    HENT: Negative.    Eyes: Negative.    Respiratory: Negative.    Cardiovascular: Negative.    Gastrointestinal: Negative.    Endocrine: Negative.    Genitourinary: Negative.    Musculoskeletal: Negative.    Skin: Negative.    Allergic/Immunologic: Negative.    Neurological: Negative.    Hematological: Negative.    Psychiatric/Behavioral: Negative.      PE:  /70   Pulse 67   Resp 16   Ht 1.664 m (5' 5.5\")   Wt 76.7 kg (169 lb 1.6 oz)   LMP  (LMP Unknown)   SpO2 98%   BMI 27.71 kg/m  "   Constitutional:  oriented to person, place, and time, appears well-nourished. No distress.           Impression:   (M81.8) Osteoporosis, idiopathic  (primary encounter diagnosis)  Comment: with multiple fragility fractures. Most acute T11 compression fracture last month, with no trauma or fall.   Anabolic agent will be indicated for severe osteoporosis and acute vertebral fracture and will help the fracture healing.   Evenity discussed and printed info provided. Black box warning and all side effects discussed.    Labs for workup of secondary causes will be done today.  Plan: Bone specific alk phosphatase, Magnesium,         Comprehensive metabolic panel, CBC with         platelets, Elp random UR reflex UIEP, Vitamin D        Deficiency, TSH, Tissue transglutaminase hayden         IgA and IgG, Protein Electrophoresis with IELP         Reflex, Parathyroid Hormone Intact, Calcium         timed urine, romosozumab-aqqg (EVENITY)         injection 210 mg            (N20.0) Nephrolithiasis  Comment: had multiple episodes of kidney stones, I will check 24h urine calcium  Plan: Bone specific alk phosphatase, Magnesium,         Comprehensive metabolic panel, CBC with         platelets, Elp random UR reflex UIEP, Vitamin D        Deficiency, TSH, Tissue transglutaminase hayden         IgA and IgG, Protein Electrophoresis with IELP         Reflex, Parathyroid Hormone Intact, Calcium         timed urine, romosozumab-aqqg (EVENITY)         injection 210 mg            (E03.9) Acquired hypothyroidism  Comment: on levothyroxine  Plan: Bone specific alk phosphatase, Magnesium,         Comprehensive metabolic panel, CBC with         platelets, Elp random UR reflex UIEP, Vitamin D        Deficiency, TSH, Tissue transglutaminase hayden         IgA and IgG, Protein Electrophoresis with IELP         Reflex, Parathyroid Hormone Intact, Calcium         timed urine     Excessive thyroid hormone causes osteoporosis and fractures. This can occur  when hyperthyroid disease goes untreated or patient is taking too much levothyroxine. One study of women with osteoporosis concluded that taking a high dose of thyroid hormone (>150 micrograms/day) increased their fracture risk more than a 50%.        (K21.00) Gastroesophageal reflux disease with esophagitis without hemorrhage  Comment: on omeprazole  Acid blocking medications, such as Aciphex, Nexium, Prevacid, Protonix, Prilosec and Nexium are commonly used for heartburn. These medications can increase osteoporosis and fracture risk. A large study of more than 13,000 patients concluded that hip fracture risk increases by 22% after one year of taking the medication and by 54% after 3 years.     (F41.1) Anxiety  Comment: on sertraline  Taking SSRI s is associated with lower bone mineral density in children and adults. In a study of adults over the age of 50, use of SSRIs not only lowered bone mineral density but they also increased the odds of falling and doubled the risk of osteoporosis fractures.      (S22.080D) Closed wedge compression fracture of T11 vertebra with routine healing, subsequent encounter  Comment: acute , 9/2023  Plan: romosozumab-aqqg (EVENITY) injection 210 mg            (S62.102A) Wrist fracture, left, closed, initial encounter  Comment: few years ago  Plan: romosozumab-aqqg (EVENITY) injection 210 mg            (S72.002D) Closed fracture of left hip with routine healing, subsequent encounter  Comment: 2018  Plan: romosozumab-aqqg (EVENITY) injection 210 mg          H/o rib fractures - 2022.    Plan:  1. The patient will take 1200 - 1500 mg of calcium daily from the diet and supplements.  2. The patient will take 2000 IU of vit D daily.  3. Treatment options discussed with the patient. She is planning dental implants and will discuss our plan for osteoporosis treatment.  4. I am asking for follow up in 2 weeks .    Patient Instructions   Labs today.    Please discuss with your dentist if you  have to have the implants.    I would suggest starting the Evenity. Printed info provided.            Thank you for the opportunity to participate in the care of your patient. If you have any additional questions, do not hesitate to contact me at [facility].    This note has been dictated using voice recognition software. Any grammatical or context distortions are unintentional and inherent to the software      With best personal regards,   Tatum Cox MD, MD, CCD  10/17/2023

## 2023-10-18 LAB
ALBUMIN SERPL ELPH-MCNC: 4.2 G/DL (ref 3.7–5.1)
ALPHA1 GLOB SERPL ELPH-MCNC: 0.3 G/DL (ref 0.2–0.4)
ALPHA2 GLOB SERPL ELPH-MCNC: 0.6 G/DL (ref 0.5–0.9)
B-GLOBULIN SERPL ELPH-MCNC: 0.7 G/DL (ref 0.6–1)
GAMMA GLOB SERPL ELPH-MCNC: 0.8 G/DL (ref 0.7–1.6)
M PROTEIN SERPL ELPH-MCNC: 0 G/DL
PROT PATTERN SERPL ELPH-IMP: NORMAL
TTG IGA SER-ACNC: 0.6 U/ML
TTG IGG SER-ACNC: <0.6 U/ML

## 2023-10-19 LAB
ALBUMIN MFR UR ELPH: 71.5 %
ALP BONE SERPL-MCNC: 20.7 UG/L
ALPHA1 GLOB MFR UR ELPH: 8.7 %
ALPHA2 GLOB MFR UR ELPH: 3.8 %
B-GLOBULIN MFR UR ELPH: 8.7 %
GAMMA GLOB MFR UR ELPH: 7.3 %
M PROTEIN MFR UR ELPH: 0 %
PROT PATTERN UR ELPH-IMP: ABNORMAL

## 2023-10-20 LAB — PROT ELPH PNL UR ELPH: NORMAL

## 2023-10-30 NOTE — INTERVAL H&P NOTE
I have reviewed the surgical (or preoperative) H&P that is linked to this encounter, and examined the patient. There are no significant changes    Norma Romero, DO  [facility]  [phone]     Winlevi Pregnancy And Lactation Text: This medication is considered safe during pregnancy and breastfeeding.

## 2023-11-07 ENCOUNTER — VIRTUAL VISIT (OUTPATIENT)
Dept: INTERNAL MEDICINE | Facility: CLINIC | Age: 63
End: 2023-11-07
Payer: COMMERCIAL

## 2023-11-07 DIAGNOSIS — B00.1 RECURRENT COLD SORES: ICD-10-CM

## 2023-11-07 DIAGNOSIS — K13.0 CHAPPED LIPS: ICD-10-CM

## 2023-11-07 PROCEDURE — 99213 OFFICE O/P EST LOW 20 MIN: CPT | Mod: VID | Performed by: NURSE PRACTITIONER

## 2023-11-07 NOTE — PROGRESS NOTES
Jonn is a 63 year old who is being evaluated via a billable video visit.      How would you like to obtain your AVS? MyChart  If the video visit is dropped, the invitation should be resent by: Text to cell phone: [phone]  Will anyone else be joining your video visit? No          Assessment & Plan     Chapped lips: Lips appear chapped and swollen. Discussed trying Aquaphor over the counter to help heal them, several times per day, keep the lips moist.    Recurrent cold sores: She last took the Valtrex two weeks ago. She has been more stressed with her Mom passing. She will update provider if she gets more cold sores and we will treat with a longer course of Valtrex.     Larisa Larson CNP  [facility]   Jonn is a 63 year old, presenting for the following health issues:  Derm Problem (Around mouth for couple months)        11/7/2023     3:11 PM   Additional Questions   Roomed by Liyah VAUGHAN       History of Present Illness       Reason for visit:  Rash around lips  Symptom onset:  More than a month  Symptoms include:  Redness sensitivity around mouth  Symptom intensity:  Moderate  Symptom progression:  Improving  Had these symptoms before:  No  What makes it worse:  No  What makes it better:  No    She eats 4 or more servings of fruits and vegetables daily.She consumes 9 sweetened beverage(s) daily.She exercises with enough effort to increase her heart rate 20 to 29 minutes per day.    She is taking medications regularly.     The patient presents today with concerns of irritated and sensitive lips.    This first started back a few months ago, when her Mom moved in with her. She was under a lot of stress care giving for her Mom, then she passed, and she had to deal with her death. She reports during this time getting quite a few cold sores. She has been taking her Valtrex as needed when this happens. Last dose was two weeks ago.    She reports also having a new  sensitive toothpaste that is bothering her mouth, so she bought a new one, and in the last few days her lips got a bit better.    In appearance today her lips look very dry and irritated.     Review of Systems   Constitutional, HEENT, cardiovascular, pulmonary, GI, , musculoskeletal, neuro, skin, endocrine and psych systems are negative, except as otherwise noted.      Objective           Vitals:  No vitals were obtained today due to virtual visit.    Physical Exam   GENERAL: Healthy, alert and no distress  EYES: Eyes grossly normal to inspection.  No discharge or erythema, or obvious scleral/conjunctival abnormalities.  RESP: No audible wheeze, cough, or visible cyanosis.  No visible retractions or increased work of breathing.    SKIN: Visible skin clear. Lips are red, irritated, swollen.   NEURO: Cranial nerves grossly intact.  Mentation and speech appropriate for age.  PSYCH: Mentation appears normal, affect normal/bright, judgement and insight intact, normal speech and appearance well-groomed.      Video-Visit Details    Type of service:  Video Visit     Originating Location (pt. Location): Other Her vehicle    Distant Location (provider location):  On-site  Platform used for Video Visit: Tiffanie

## 2023-11-09 ENCOUNTER — HOSPITAL ENCOUNTER (OUTPATIENT)
Dept: RADIOLOGY | Facility: HOSPITAL | Age: 63
Discharge: HOME | End: 2023-11-09
Attending: SURGERY | Admitting: SURGERY
Payer: COMMERCIAL

## 2023-11-09 ENCOUNTER — OFFICE VISIT (OUTPATIENT)
Dept: NEUROSURGERY | Facility: CLINIC | Age: 63
End: 2023-11-09
Attending: SURGERY
Payer: COMMERCIAL

## 2023-11-09 ENCOUNTER — PATIENT OUTREACH (OUTPATIENT)
Dept: GASTROENTEROLOGY | Facility: CLINIC | Age: 63
End: 2023-11-09

## 2023-11-09 VITALS — SYSTOLIC BLOOD PRESSURE: 152 MMHG | HEART RATE: 56 BPM | OXYGEN SATURATION: 96 % | DIASTOLIC BLOOD PRESSURE: 84 MMHG

## 2023-11-09 DIAGNOSIS — S22.080D CLOSED WEDGE COMPRESSION FRACTURE OF T11 VERTEBRA WITH ROUTINE HEALING, SUBSEQUENT ENCOUNTER: Primary | ICD-10-CM

## 2023-11-09 DIAGNOSIS — S22.080A COMPRESSION FRACTURE OF T11 VERTEBRA, INITIAL ENCOUNTER (H): ICD-10-CM

## 2023-11-09 PROCEDURE — 99213 OFFICE O/P EST LOW 20 MIN: CPT | Performed by: PHYSICIAN ASSISTANT

## 2023-11-09 PROCEDURE — 72070 X-RAY EXAM THORAC SPINE 2VWS: CPT

## 2023-11-09 ASSESSMENT — PAIN SCALES - GENERAL: PAINLEVEL: MODERATE PAIN (5)

## 2023-11-09 NOTE — NURSING NOTE
"Jonn Watson is a 63 year old female who presents for:  Chief Complaint   Patient presents with    RECHECK        Initial Vitals:  BP (!) 152/84   Pulse 56   LMP  (LMP Unknown)   SpO2 96%  Estimated body mass index is 27.71 kg/m  as calculated from the following:    Height as of 10/17/23: 5' 5.5\" (1.664 m).    Weight as of 10/17/23: 169 lb 1.6 oz (76.7 kg).. There is no height or weight on file to calculate BSA. BP completed using cuff size: regular  Moderate Pain (5)    Rad Lui    "

## 2023-11-09 NOTE — PATIENT INSTRUCTIONS
Patient has been advised to continue to wear her brace when out of bed and sitting upright. She should limit her bending and twisting as well as no lifting greater than 5-10 pounds. Will plan to follow up in 6 weeks with repeat xray at that time of which can be a telephone appointment if patient would like. Also discussed that should xray show stability would plan to wean her from her brace. In regards to her low back and right leg pain we can further evaluate once fracture healed or she can follow up with [facility] as she has seen then in the past.

## 2023-11-09 NOTE — LETTER
11/9/2023         RE: Jonn Watson  [address] [place] [address]        Dear Colleague,    Thank you for referring your patient, Jonn Watson, to the [facility]. Please see a copy of my visit note below.    Neurosurgery Progress Note: 11/9/2023     A/P: T11 compression fracture 9/11/2023 treated with TLSO brace, attending Dr. Welch       Plan:Patient has been advised to continue to wear her brace when out of bed and sitting upright. She should limit her bending and twisting as well as no lifting greater than 5-10 pounds. Will plan to follow up in 6 weeks with repeat xray at that time of which can be a telephone appointment if patient would like. Also discussed that should xray show stability would plan to wean her from her brace. In regards to her low back and right leg pain we can further evaluate once fracture healed or she can follow up with [facility] as she has seen then in the past.      Ms. Watson is a 63 year old female who presents in follow up for her T11 compression fracture noted on 9/11/2023. She notes that her back is feeling okay from the fracture and that she continues to wear her TLSO brace as directed though she is not wearing he currently in her appointment today. She notes that she has chronic low back pain on the right that will radicular into her posterior right lower extremity to her foot and began about 3-4 days ago. She denies any injury or trauma at that onset of her recent back pain and notes that she has had similar flare of of radicular pain in the past of which she has been evaluated by [facility] in the past. She notes in the past she had conservative treatment of which her symptoms had resolved until recently. She denies any numbness tingling or weakness. She denies changes in bowel or bladder habits.       HPI:  62 yo female who presents with back pain. Was lifting her mother from a commode and noted acute back pain following.  Her mother was 140 pounds. This event happened 3.5 weeks ago. Right sided back pain is ongoing. Spasms are improved. Standing is better without severe pain. Last 4-5 days have improved but is also not doing anything physically. Denies lower extremity symptoms, saddle anesthesia or bowel or bladder dysfunction.        Exam:  BP (!) 152/84   Pulse 56   LMP  (LMP Unknown)   SpO2 96%     General: alert and oriented x3     Strength is 5/5 throughout both upper extremities throughout.    Sensation is intact throughout both upper and lower extremities    Gait is smooth and coordinated    Tenderness to paraspinal muscles of of right lumbar spine     No pain to palpation to bilateral SI joints       Imaging:  Xray reviewed personally  IMPRESSION: Diffuse osseous demineralization. Unchanged chronic superior T11 compression fracture deformity with approximately 30% vertebral body height loss anteriorly and trace retropulsion. Maintained remainder of the vertebral body heights. Slightly   exaggerated thoracic kyphosis and mild dextroconvex curvature at the thoracolumbar junction. Mild multilevel intervertebral disc height loss and degenerative endplate irregularity, worst at the mid thoracic levels. Atherosclerotic calcifications within   the abdomen. Clear visualized lungs.      Janet De La Garza PA-C  [facility]  O: [phone]       Again, thank you for allowing me to participate in the care of your patient.        Sincerely,        Janet De La Garza PA-C

## 2023-11-09 NOTE — PROGRESS NOTES
Neurosurgery Progress Note: 11/9/2023     A/P: T11 compression fracture 9/11/2023 treated with TLSO brace, attending Dr. Welch       Plan:Patient has been advised to continue to wear her brace when out of bed and sitting upright. She should limit her bending and twisting as well as no lifting greater than 5-10 pounds. Will plan to follow up in 6 weeks with repeat xray at that time of which can be a telephone appointment if patient would like. Also discussed that should xray show stability would plan to wean her from her brace. In regards to her low back and right leg pain we can further evaluate once fracture healed or she can follow up with [facility] as she has seen then in the past.      Ms. Watson is a 63 year old female who presents in follow up for her T11 compression fracture noted on 9/11/2023. She notes that her back is feeling okay from the fracture and that she continues to wear her TLSO brace as directed though she is not wearing he currently in her appointment today. She notes that she has chronic low back pain on the right that will radicular into her posterior right lower extremity to her foot and began about 3-4 days ago. She denies any injury or trauma at that onset of her recent back pain and notes that she has had similar flare of of radicular pain in the past of which she has been evaluated by [facility] in the past. She notes in the past she had conservative treatment of which her symptoms had resolved until recently. She denies any numbness tingling or weakness. She denies changes in bowel or bladder habits.       HPI:  64 yo female who presents with back pain. Was lifting her mother from a commode and noted acute back pain following. Her mother was 140 pounds. This event happened 3.5 weeks ago. Right sided back pain is ongoing. Spasms are improved. Standing is better without severe pain. Last 4-5 days have improved but is also not doing anything physically. Denies lower extremity  symptoms, saddle anesthesia or bowel or bladder dysfunction.        Exam:  BP (!) 152/84   Pulse 56   LMP  (LMP Unknown)   SpO2 96%     General: alert and oriented x3     Strength is 5/5 throughout both upper extremities throughout.    Sensation is intact throughout both upper and lower extremities    Gait is smooth and coordinated    Tenderness to paraspinal muscles of of right lumbar spine     No pain to palpation to bilateral SI joints       Imaging:  Xray reviewed personally  IMPRESSION: Diffuse osseous demineralization. Unchanged chronic superior T11 compression fracture deformity with approximately 30% vertebral body height loss anteriorly and trace retropulsion. Maintained remainder of the vertebral body heights. Slightly   exaggerated thoracic kyphosis and mild dextroconvex curvature at the thoracolumbar junction. Mild multilevel intervertebral disc height loss and degenerative endplate irregularity, worst at the mid thoracic levels. Atherosclerotic calcifications within   the abdomen. Clear visualized lungs.      Janet De La Garza PA-C  [facility]  O: [phone]

## 2023-12-07 ENCOUNTER — MYC MEDICAL ADVICE (OUTPATIENT)
Dept: INTERNAL MEDICINE | Facility: CLINIC | Age: 63
End: 2023-12-07
Payer: COMMERCIAL

## 2023-12-07 ENCOUNTER — TRANSFERRED RECORDS (OUTPATIENT)
Dept: HEALTH INFORMATION MANAGEMENT | Facility: CLINIC | Age: 63
End: 2023-12-07
Payer: COMMERCIAL

## 2023-12-07 NOTE — TELEPHONE ENCOUNTER
Care Team,  Please address team appropriate mychart message regarding scheduling.  Or route back if needing triage.    Halie Vora RN, BSN  [facility]

## 2023-12-20 ENCOUNTER — VIRTUAL VISIT (OUTPATIENT)
Dept: INTERNAL MEDICINE | Facility: CLINIC | Age: 63
End: 2023-12-20
Payer: COMMERCIAL

## 2023-12-20 DIAGNOSIS — R22.9 LOCALIZED SUPERFICIAL SWELLING, MASS, OR LUMP: Primary | ICD-10-CM

## 2023-12-20 DIAGNOSIS — J34.89 INTERNAL NASAL LESION: ICD-10-CM

## 2023-12-20 DIAGNOSIS — B00.1 COLD SORE: ICD-10-CM

## 2023-12-20 PROCEDURE — 99213 OFFICE O/P EST LOW 20 MIN: CPT | Mod: VID | Performed by: NURSE PRACTITIONER

## 2023-12-20 RX ORDER — TRIAMCINOLONE ACETONIDE 5 MG/G
1 OINTMENT TOPICAL 2 TIMES DAILY
Qty: 30 G | Refills: 11 | Status: SHIPPED | OUTPATIENT
Start: 2023-12-20

## 2023-12-20 RX ORDER — VALACYCLOVIR HYDROCHLORIDE 1 G/1
1000 TABLET, FILM COATED ORAL 2 TIMES DAILY
Qty: 14 TABLET | Refills: 4 | Status: SHIPPED | OUTPATIENT
Start: 2023-12-20

## 2023-12-20 NOTE — PROGRESS NOTES
Jonn is a 63 year old who is being evaluated via a billable video visit.      How would you like to obtain your AVS? MyChart  If the video visit is dropped, the invitation should be resent by: Text to cell phone: [phone]  Will anyone else be joining your video visit? No          Assessment & Plan     Cold sore: Recurrent cold sores, refilled her Valtrex today.   - valACYclovir (VALTREX) 1000 mg tablet  Dispense: 14 tablet; Refill: 4    Internal nasal lesion: Refilled her medication.   - triamcinolone (KENALOG) 0.5 % external ointment  Dispense: 30 g; Refill: 11    Localized superficial swelling, mass, or lump: Left chest wall, under breast, nickel sized lump, movable and squishy. Will check a diagnostic ultrasound and mammogram. Suspected lipoma.  - MA Diagnostic Digital Bilateral  - US Breast Left Limited 1-3 Quadrants    MEGAN Toney [facility]   Jonn is a 63 year old, presenting for the following health issues:  Follow Up (Bump left side of ribcage under breast, tender)        12/20/2023     3:05 PM   Additional Questions   Roomed by Liyah REINA     The patient presents today for follow up.    She reports that she was down in [place], and noticed a lump under her left breast in her ribcage. She reports rubbing the area, and it has been sore. She reports that the lump is soft and movable.    She also reports that she needs her valtrex refilled, she has been having a lot of cold sores, her job has been very stressful.      Review of Systems   Constitutional, HEENT, cardiovascular, pulmonary, GI, , musculoskeletal, neuro, skin, endocrine and psych systems are negative, except as otherwise noted.      Objective           Vitals:  No vitals were obtained today due to virtual visit.    Physical Exam  Chest:            GENERAL: Healthy, alert and no distress  EYES: Eyes grossly normal to inspection.  No discharge or erythema, or obvious  scleral/conjunctival abnormalities.  RESP: No audible wheeze, cough, or visible cyanosis.  No visible retractions or increased work of breathing.    SKIN: Visible skin clear. No significant rash, abnormal pigmentation or lesions.  NEURO: Cranial nerves grossly intact.  Mentation and speech appropriate for age.  PSYCH: Mentation appears normal, affect normal/bright, judgement and insight intact, normal speech and appearance well-groomed.        Video-Visit Details    Type of service:  Video Visit     Originating Location (pt. Location): Other Work    Distant Location (provider location):  Off-site  Platform used for Video Visit: [employer]

## 2023-12-28 ENCOUNTER — HOSPITAL ENCOUNTER (OUTPATIENT)
Dept: MAMMOGRAPHY | Facility: CLINIC | Age: 63
Discharge: HOME | End: 2023-12-28
Attending: NURSE PRACTITIONER
Payer: COMMERCIAL

## 2023-12-28 DIAGNOSIS — R22.9 LOCALIZED SUPERFICIAL SWELLING, MASS, OR LUMP: ICD-10-CM

## 2023-12-28 PROCEDURE — 76642 ULTRASOUND BREAST LIMITED: CPT | Mod: LT

## 2023-12-28 PROCEDURE — 77062 BREAST TOMOSYNTHESIS BI: CPT

## 2024-01-11 ENCOUNTER — HOSPITAL ENCOUNTER (OUTPATIENT)
Dept: RADIOLOGY | Facility: HOSPITAL | Age: 64
Discharge: HOME | End: 2024-01-11
Attending: PHYSICIAN ASSISTANT | Admitting: PHYSICIAN ASSISTANT
Payer: COMMERCIAL

## 2024-01-11 DIAGNOSIS — S22.080D CLOSED WEDGE COMPRESSION FRACTURE OF T11 VERTEBRA WITH ROUTINE HEALING, SUBSEQUENT ENCOUNTER: ICD-10-CM

## 2024-01-11 PROCEDURE — 72070 X-RAY EXAM THORAC SPINE 2VWS: CPT

## 2024-01-18 ENCOUNTER — VIRTUAL VISIT (OUTPATIENT)
Dept: NEUROSURGERY | Facility: CLINIC | Age: 64
End: 2024-01-18
Payer: COMMERCIAL

## 2024-01-18 DIAGNOSIS — R22.2 LUMP IN CHEST: Primary | ICD-10-CM

## 2024-01-18 PROCEDURE — 99442 PR PHYSICIAN TELEPHONE EVALUATION 11-20 MIN: CPT | Performed by: PHYSICIAN ASSISTANT

## 2024-01-18 NOTE — PROGRESS NOTES
Jonn is a 64 year old who is being evaluated via a billable telephone visit.      What phone number would you like to be contacted at? [phone]  How would you like to obtain your AVS? Tayla    Distant Location (provider location):  On-site  Distant Location (patient location): at home    A/P: T11 compression fracture 9/11/2023 treated with TLSO brace, attending Dr. Welch         Plan: She has been advised to wean from their brace by removing the brace for 1-2 hours a day, increasing each day by 1-2 hour increments.  If at any time during the wean you experience excessive fatigue, back pain or spasm, back down to the previous level for a day or so, then resume schedule.  Once out of brace for a full 8 hours, discontinue altogether or wear only as needed for comfort. She is very concerned about a lump under her breast along left rib cage, not visualized on prior images will order CT chest to be completed and will call with results advised that based on results would refer back to her PCP for further evaluation of which she states understanding. In regards to her fracture no further follow up indicated.       Subjective   Jonn is a 64 year old, presenting via telephone appointment for a T11 compression fracture 9/11/2023 treated with TLSO brace. Presently she states that she is doing okay she had slight increased in her back pain over the past week and she describes it as a tightness. She denies any radicular lower extremity pain numbness tingling or weakness. She denies gait instability or changes in bowel or bladder habits. She notes a tender lump under left breast along her rib that she states is soft lesion. She had a mammogram as well as ultrasound of which both were negative.       Objective       Vitals:  No vitals were obtained today due to virtual visit.      Review of Systems  Constitutional, HEENT, cardiovascular, pulmonary, gi and gu systems are negative, except as otherwise noted.  Physical Exam    General: Alert and no distress //Respiratory: No audible wheeze, cough, or shortness of breath // Psychiatric:  Appropriate affect, tone, and pace of words      Images reviewed personally stable appearance of T11 fracture     IMPRESSION: No significant loss of vertebral body height at T11. Otherwise preserved vertebral body heights in the thoracic spine. Preserved vertebral body alignment. No acute finding in the extraspinal structures.         Phone call duration: 15 minutes  Signed Electronically by: Janet De La Garza PA-C

## 2024-01-18 NOTE — NURSING NOTE
Jonn is a 64 year old who is being evaluated via a billable telephone visit.      What phone number would you like to be contacted at? [phone]  How would you like to obtain your AVS? Tayla    Distant Location (provider location):  On-site  Phone call duration: 5 minutes

## 2024-01-31 ENCOUNTER — HOSPITAL ENCOUNTER (OUTPATIENT)
Dept: CT IMAGING | Facility: HOSPITAL | Age: 64
Discharge: HOME | End: 2024-01-31
Attending: PHYSICIAN ASSISTANT | Admitting: PHYSICIAN ASSISTANT
Payer: COMMERCIAL

## 2024-01-31 DIAGNOSIS — R22.2 LUMP IN CHEST: ICD-10-CM

## 2024-01-31 PROCEDURE — 250N000011 HC RX IP 250 OP 636: Performed by: PHYSICIAN ASSISTANT

## 2024-01-31 PROCEDURE — 71260 CT THORAX DX C+: CPT

## 2024-01-31 RX ORDER — IOPAMIDOL 755 MG/ML
90 INJECTION, SOLUTION INTRAVASCULAR ONCE
Status: COMPLETED | OUTPATIENT
Start: 2024-01-31 | End: 2024-01-31

## 2024-01-31 RX ADMIN — IOPAMIDOL 90 ML: 755 INJECTION, SOLUTION INTRAVENOUS at 16:53

## 2024-04-15 ENCOUNTER — TRANSFERRED RECORDS (OUTPATIENT)
Dept: HEALTH INFORMATION MANAGEMENT | Facility: CLINIC | Age: 64
End: 2024-04-15
Payer: COMMERCIAL

## 2024-04-17 DIAGNOSIS — I10 ESSENTIAL HYPERTENSION: ICD-10-CM

## 2024-04-17 RX ORDER — ATENOLOL 50 MG/1
50 TABLET ORAL DAILY
Qty: 90 TABLET | Refills: 3 | Status: SHIPPED | OUTPATIENT
Start: 2024-04-17

## 2024-04-29 NOTE — TELEPHONE ENCOUNTER
Medication Question or Clarification  Who is calling: Patient   What medication are you calling about ?:     hydrOXYzine HCl (ATARAX) 10 MG tablet 30 tablet 11 3/20/2019     Sig: TAKE 1/2 TO 1 TABLET BY MOUTH AT BEDTIME AS NEEDED    Sent to pharmacy as: hydrOXYzine HCl (ATARAX) 10 MG tablet    E-Prescribing Status: Receipt confirmed by pharmacy (3/20/2019  2:33 PM CDT)      What dose do you take ?:  10 mg daily   How often are you taking the medication?:  1.5 tablets (15 mg total at night)   Who prescribed the medication?: Chandrakant Jack MD  What is your question/concern?:    Pharmacy:[id number] - [place], [place] - [address] AT [facility]  [phone]   Okay to leave a detailed message?: Yes  Site CMT - Please call the pharmacy to obtain any additional needed information.    
Refill sent to Md.     Imelda Gallegos LPN    
18.5

## 2024-05-03 ENCOUNTER — TRANSFERRED RECORDS (OUTPATIENT)
Dept: HEALTH INFORMATION MANAGEMENT | Facility: CLINIC | Age: 64
End: 2024-05-03
Payer: COMMERCIAL

## 2024-06-10 DIAGNOSIS — F41.9 ANXIETY: ICD-10-CM

## 2024-06-10 RX ORDER — SERTRALINE HYDROCHLORIDE 100 MG/1
TABLET, FILM COATED ORAL
Qty: 180 TABLET | Refills: 1 | Status: SHIPPED | OUTPATIENT
Start: 2024-06-10 | End: 2024-09-27

## 2024-06-10 NOTE — TELEPHONE ENCOUNTER
Pending Prescriptions:                       Disp   Refills    sertraline (ZOLOFT) 100 MG tablet         180 ta*3            Sig: TAKE 2 TABLETS(200 MG) BY MOUTH DAILY    Pharmacy:    [id number] - [place], [place] - [address] AT [facility] [address]  [place]

## 2024-07-02 ENCOUNTER — E-VISIT (OUTPATIENT)
Dept: INTERNAL MEDICINE | Facility: CLINIC | Age: 64
End: 2024-07-02
Payer: COMMERCIAL

## 2024-07-02 DIAGNOSIS — J06.9 UPPER RESPIRATORY TRACT INFECTION, UNSPECIFIED TYPE: Primary | ICD-10-CM

## 2024-07-02 PROCEDURE — 99207 PR NON-BILLABLE SERV PER CHARTING: CPT | Performed by: INTERNAL MEDICINE

## 2024-07-02 NOTE — PATIENT INSTRUCTIONS
Dear Jonn Watson,    We are sorry you are not feeling well. Based on the responses you provided, it is recommended that you be seen in-person in urgent care so we can better evaluate your symptoms. Please click here to find the nearest urgent care location to you.   You will not be charged for this Visit. Thank you for trusting us with your care.    Tatum Cox MD

## 2024-07-03 ENCOUNTER — OFFICE VISIT (OUTPATIENT)
Dept: FAMILY MEDICINE | Facility: CLINIC | Age: 64
End: 2024-07-03
Payer: COMMERCIAL

## 2024-07-03 VITALS
SYSTOLIC BLOOD PRESSURE: 181 MMHG | HEART RATE: 63 BPM | BODY MASS INDEX: 28.68 KG/M2 | OXYGEN SATURATION: 98 % | WEIGHT: 175 LBS | RESPIRATION RATE: 18 BRPM | TEMPERATURE: 99.3 F | DIASTOLIC BLOOD PRESSURE: 81 MMHG

## 2024-07-03 DIAGNOSIS — J01.00 ACUTE NON-RECURRENT MAXILLARY SINUSITIS: Primary | ICD-10-CM

## 2024-07-03 PROCEDURE — 99213 OFFICE O/P EST LOW 20 MIN: CPT | Performed by: PHYSICIAN ASSISTANT

## 2024-07-03 NOTE — PROGRESS NOTES
Patient presents with:  URI: Started last Saturday- loose cough, sinus seem to be getting worse, congestion, sore throat from coughing, headache, fatigue.       Clinical Decision Making:  Patient is treated with Augmentin for sinusitis.  Use of over-the-counter Flonase as well as dextromethorphan for cough. Expected course of resolution and indication for return was gone over and questions were answered to patient/parent's satisfaction before discharge.        ICD-10-CM    1. Acute non-recurrent maxillary sinusitis  J01.00 amoxicillin-clavulanate (AUGMENTIN) 875-125 MG tablet          Patient Instructions   Over-the-counter nasal steroid spray, follow packaging directions  Over-the-counter Mucinex, follow packaging directions  Hot packs 3 times per day to the forehead and face over the tender sinuses  Nasal saline irrigation or [place] Randall for congestion  Antibiotic as written below.  Follow up with primary care provider if you do not get resolution with the course of treatment.  Return to walk-in care if complication or new symptoms arise in the interim.         HPI:  Jonn Watson is a 64 year old female who presents today for evaluation of 2 day of acute onset facial and frontal and maxillary pain and pressure that is radiating to the teeth and to the jaw.  Patient has a headache that is made worse with dependency.  Postnasal drainage and intermittent dry nonproductive cough.  Denies fever chills night sweats epistaxis or other constitutional symptoms.  Has not tried any treatment for this at home.    History obtained from chart review and the patient.    Problem List:  2023-10: Closed fracture of left hip with routine healing, subsequent   encounter  2023-10: Wrist fracture, left, closed, initial encounter  2023-10: Closed wedge compression fracture of T11 vertebra with   routine healing, subsequent encounter  2023-10: H/O fracture of rib  2023-03: Moderate episode of recurrent major depressive disorder  (H)  2022-09: Adenoma of large intestine  2022-06: Benign neoplasm of descending colon  2022-06: Benign neoplasm of transverse colon  2022-06: Hemorrhage of rectum and anus  2022-06: Hemorrhoids  2022-06: Polyp of colon  2022-06: Acute lower GI bleeding  2022-05: Hiatal hernia  2022-05: Postmenopausal bleeding  2017-01: KVNG (generalized anxiety disorder)  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  Iron Deficiency  Arthropathy Of Multiple Sites  Fatigue  Midback Pain  Hypothyroidism  Hypertension  Arthropathy Of The Ankle / Foot  Esophageal Reflux  Nephrolithiasis  Anxiety  Dizziness  Hiatal Hernia  Nontoxic Solitary Thyroid Nodule      Past Medical History:   Diagnosis Date    Anemia     Anxiety     Bladder infection     Bronchitis     Depression     Disease of thyroid gland     Endometriosis     GERD (gastroesophageal reflux disease)     Hiatal hernia     Hip pain     Right>Left    HL (hearing loss)     Hypertension     Kidney stone     Motion sickness        Social History     Tobacco Use    Smoking status: Some Days     Types: Cigarettes     Passive exposure: Current    Smokeless tobacco: Former    Tobacco comments:     Casual smoker by hx   Substance Use Topics    Alcohol use: No       Review of Systems  As above in HPI otherwise negative.    Vitals:    07/03/24 1220   BP: (!) 181/81   BP Location: Left arm   Patient Position: Sitting   Cuff Size: Adult Regular   Pulse: 63   Resp: 18   Temp: 99.3  F (37.4  C)   TempSrc: Oral   SpO2: 98%   Weight: 79.4 kg (175 lb)       General: Patient is resting comfortably no acute distress is afebrile  HEENT: Head is normocephalic atraumatic   Frontal and maxillary sinuses are tender to percussion  eyes are PERRL   EOMI sclera anicteric   TMs are clear bilaterally.  Hearing aids are in the external auditory canal bilaterally  Throat is with posterior pharyngeal wall exudate but no erythema  No cervical lymphadenopathy present  Lungs: Right midlung field has prolonged expiratory wheezes and  congestion left lung fields are clear.  Normal respiratory effort and excursion  Heart: Regular rate and rhythm  Skin: Without rash non-diaphoretic    Physical Exam    At the end of the encounter, I discussed results, diagnosis, medications. Discussed red flags for immediate return to clinic/ER, as well as indications for follow up if no improvement. Patient understood and agreed to plan. Patient was stable for discharge.

## 2024-07-03 NOTE — PATIENT INSTRUCTIONS
Over-the-counter nasal steroid spray, follow packaging directions  Over-the-counter Mucinex, follow packaging directions  Hot packs 3 times per day to the forehead and face over the tender sinuses  Nasal saline irrigation or Mary Randall for congestion  Antibiotic as written below.  Follow up with primary care provider if you do not get resolution with the course of treatment.  Return to walk-in care if complication or new symptoms arise in the interim.

## 2024-07-31 ENCOUNTER — OFFICE VISIT (OUTPATIENT)
Dept: PEDIATRICS | Facility: CLINIC | Age: 64
End: 2024-07-31
Payer: COMMERCIAL

## 2024-07-31 ENCOUNTER — NURSE TRIAGE (OUTPATIENT)
Dept: INTERNAL MEDICINE | Facility: CLINIC | Age: 64
End: 2024-07-31

## 2024-07-31 ENCOUNTER — ANCILLARY PROCEDURE (OUTPATIENT)
Dept: GENERAL RADIOLOGY | Facility: CLINIC | Age: 64
End: 2024-07-31
Attending: PHYSICIAN ASSISTANT
Payer: COMMERCIAL

## 2024-07-31 ENCOUNTER — TELEPHONE (OUTPATIENT)
Dept: NEUROSURGERY | Facility: CLINIC | Age: 64
End: 2024-07-31
Payer: COMMERCIAL

## 2024-07-31 VITALS
HEIGHT: 65 IN | WEIGHT: 167.2 LBS | RESPIRATION RATE: 20 BRPM | TEMPERATURE: 97.9 F | BODY MASS INDEX: 27.86 KG/M2 | HEART RATE: 61 BPM | OXYGEN SATURATION: 98 % | SYSTOLIC BLOOD PRESSURE: 152 MMHG | DIASTOLIC BLOOD PRESSURE: 81 MMHG
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
## 2024-07-31 DIAGNOSIS — R07.81 RIB PAIN ON LEFT SIDE: ICD-10-CM

## 2024-07-31 DIAGNOSIS — R07.81 RIB PAIN ON LEFT SIDE: Primary | ICD-10-CM

## 2024-07-31 PROCEDURE — 71101 X-RAY EXAM UNILAT RIBS/CHEST: CPT | Mod: TC | Performed by: STUDENT IN AN ORGANIZED HEALTH CARE EDUCATION/TRAINING PROGRAM

## 2024-07-31 PROCEDURE — 99214 OFFICE O/P EST MOD 30 MIN: CPT | Performed by: PHYSICIAN ASSISTANT

## 2024-07-31 RX ORDER — TRAMADOL HYDROCHLORIDE 50 MG/1
50 TABLET ORAL EVERY 8 HOURS PRN
Qty: 12 TABLET | Refills: 0 | Status: SHIPPED | OUTPATIENT
Start: 2024-07-31 | End: 2024-08-03

## 2024-07-31 ASSESSMENT — PATIENT HEALTH QUESTIONNAIRE - PHQ9
SUM OF ALL RESPONSES TO PHQ QUESTIONS 1-9: 0
10. IF YOU CHECKED OFF ANY PROBLEMS, HOW DIFFICULT HAVE THESE PROBLEMS MADE IT FOR YOU TO DO YOUR WORK, TAKE CARE OF THINGS AT HOME, OR GET ALONG WITH OTHER PEOPLE: NOT DIFFICULT AT ALL
SUM OF ALL RESPONSES TO PHQ QUESTIONS 1-9: 0

## 2024-07-31 ASSESSMENT — PAIN SCALES - GENERAL: PAINLEVEL: EXTREME PAIN (8)

## 2024-07-31 NOTE — TELEPHONE ENCOUNTER
Called and advised Jonn to see her PCP for rib fracture evaluation - she verbalized agreement.  Kamila Hurtado RN, CNRN

## 2024-07-31 NOTE — TELEPHONE ENCOUNTER
[facility] Call Center    Phone Message    May a detailed message be left on voicemail: yes     Reason for Call: Pt called.  She said that she thinks she might have cracked another rib.  She wants to schedule with Janet but wants to know if she wants to order an Xray prior to being seen.  Please call her back to advise.  Thanks.

## 2024-07-31 NOTE — TELEPHONE ENCOUNTER
S-(situation): Received call from patient with concerns of possible broken rib.    B-(background): Last Friday, patient was sitting in a chair holding a dog on a leash. The dog ran and patient's chair tipped over, patient hit left side of rib cage on chair pole which hurt a lot.     A-(assessment): Patient has small amount of bruising on left rib cage. Patient reports pain level as 8/10 when bending over or using left arm. Patient reports trouble sleeping. Patient denies any difficulty breathing, states she can feel the pain when taking a deep breath.     R-(recommendations): Per protocol, advised visit within next few days. Scheduled for OV at [facility] per patient request, 7/31/24. No further questions at this time.     Reason for Disposition   Injury and pain has not improved after 3 days    Additional Information   Negative: Last tetanus shot > 5 years ago and DIRTY cut or scrape   Negative: Last tetanus shot > 10 years ago and CLEAN cut or scrape   Negative: MODERATE pain (e.g., interferes with normal activities) and high-risk adult (e.g., age > 60 years, osteoporosis, chronic steroid use)   Negative: Suspicious history for the injury   Negative: Large swelling or bruise and size > palm of person's hand   Negative: Collarbone is painful and difficulty raising arm   Negative: Patient is confused or is an unreliable provider of information (e.g., dementia, severe intellectual disability, alcohol intoxication)   Negative: No prior tetanus shots (or is not fully vaccinated) and any wound (e.g., cut or scrape)   Negative: HIV positive or severe immunodeficiency (severely weak immune system) and DIRTY cut or scrape   Negative: Patient wants to be seen   Negative: SEVERE chest pain   Negative: Difficulty breathing and not severe   Negative: Skin is split open or gaping   Negative: Bleeding won't stop after 10 minutes of direct pressure (using correct technique)   Negative: Sounds like a serious injury to the  triager   Negative: Can't take a deep breath but no respiratory distress (e.g., hurts to take a deep breath)   Negative: Shallow puncture wound   Negative: Major injury from dangerous force or speed (e.g., MVA, fall > 10 feet or 3 meters)   Negative: Bullet wound, knife wound or other penetrating object   Negative: Puncture wound that sounds life-threatening to the triager   Negative: SEVERE difficulty breathing (e.g., struggling for each breath, speaks in single words)   Negative: Major bleeding (actively dripping or spurting) that can't be stopped   Negative: Open wound of the chest with sound of moving air (sucking wound) or visible air bubbles   Negative: Shock suspected (e.g., cold/pale/clammy skin, too weak to stand, low BP, rapid pulse)   Negative: Coughing or spitting up blood   Negative: Bluish (or gray) lips or face   Negative: Unconscious or was unconscious   Negative: Sounds like a life-threatening emergency to the triager   Negative: Chest pain not from an injury   Negative: Wound looks infected    Protocols used: Chest Injury-A-DEVANG MCCOY RN 7/31/2024 at 1:09 PM

## 2024-07-31 NOTE — PROGRESS NOTES
"  Assessment & Plan     Rib pain on left side  Heat/ice at site. Tramadol PRN severe pain.   Patient has no further questions.  - XR Ribs & Chest Left G/E 3 Views; Future  - traMADol (ULTRAM) 50 MG tablet; Take 1 tablet (50 mg) by mouth every 8 hours as needed for severe pain    Fall, initial encounter      Melinda Lunsford is a 64 year old, presenting for the following health issues:  left rib pain        7/31/2024     2:19 PM   Additional Questions   Roomed by miss   Accompanied by self         7/31/2024     2:19 PM   Patient Reported Additional Medications   Patient reports taking the following new medications no     HPI   Concern - left rib pain--sitting in a chair, dog pulled on leash and patient's left side hit the chair  Pain with deep breath  Bruising present --yellow  Sore to turn at night  Onset: 6 days  Description: sharp,achy   Intensity: moderate  Progression of Symptoms:  same  Accompanying Signs & Symptoms: no  Previous history of similar problem: no  Precipitating factors:        Worsened by: deep breathing ,certain movement,cough  Alleviating factors:        Improved by: no  Therapies tried and outcome: ibuprofen    History of osteoporosis.    Review of Systems  Constitutional, HEENT, cardiovascular, pulmonary, gi and gu systems are negative, except as otherwise noted.      Objective    BP (!) 152/81 (BP Location: Right arm, Patient Position: Sitting, Cuff Size: Adult Regular)   Pulse 61   Temp 97.9  F (36.6  C) (Tympanic)   Resp 20   Ht 1.651 m (5' 5\")   Wt 75.8 kg (167 lb 3.2 oz)   LMP  (LMP Unknown)   SpO2 98%   BMI 27.82 kg/m    Body mass index is 27.82 kg/m .  Physical Exam   GENERAL: alert and no distress  RESP: lungs clear to auscultation - no rales, rhonchi or wheezes  CV: regular rate and rhythm, normal S1 S2, no S3 or S4, no murmur, click or rub, no peripheral edema  CHEST WALL: tender over the left lateral breast and over ribs 6-7. No ecchymosis or erythema.     Results for " orders placed or performed in visit on 07/31/24   XR Ribs & Chest Left G/E 3 Views     Status: None    Narrative    XR RIBS AND CHEST LEFT 3 VIEWS 7/31/2024 2:57 PM     HISTORY: Rib pain on left side    COMPARISON: CT chest abdomen and pelvis 1/31/2024.       Impression    IMPRESSION:    No evidence of acute displaced rib fracture. Remote eighth and ninth  rib fractures. No acute cardiopulmonary process.    ADRI FLYNN MD         SYSTEM ID:  [id number]           Signed Electronically by: Maria G Gutierrez PA-C

## 2024-08-04 DIAGNOSIS — E03.9 ACQUIRED HYPOTHYROIDISM: ICD-10-CM

## 2024-08-05 NOTE — TELEPHONE ENCOUNTER
Called patient to assist with establish care appointment for further refills.  No answer. Left vm to call the clinic back.  When patient calls back please assist with est. care appointment.

## 2024-08-06 ENCOUNTER — OFFICE VISIT (OUTPATIENT)
Dept: PEDIATRICS | Facility: CLINIC | Age: 64
End: 2024-08-06
Payer: COMMERCIAL

## 2024-08-06 VITALS
RESPIRATION RATE: 18 BRPM | OXYGEN SATURATION: 99 % | DIASTOLIC BLOOD PRESSURE: 70 MMHG | HEART RATE: 97 BPM | BODY MASS INDEX: 27.82 KG/M2 | HEIGHT: 65 IN | TEMPERATURE: 97.9 F | SYSTOLIC BLOOD PRESSURE: 120 MMHG

## 2024-08-06 DIAGNOSIS — Z13.220 SCREENING FOR CHOLESTEROL LEVEL: Primary | ICD-10-CM

## 2024-08-06 DIAGNOSIS — Z13.21 ENCOUNTER FOR VITAMIN DEFICIENCY SCREENING: ICD-10-CM

## 2024-08-06 DIAGNOSIS — R73.03 PREDIABETES: ICD-10-CM

## 2024-08-06 DIAGNOSIS — E03.9 ACQUIRED HYPOTHYROIDISM: ICD-10-CM

## 2024-08-06 DIAGNOSIS — F41.9 ANXIETY: ICD-10-CM

## 2024-08-06 PROBLEM — K44.9 HIATAL HERNIA: Status: RESOLVED | Noted: 2022-05-16 | Resolved: 2024-08-06

## 2024-08-06 PROBLEM — D48.5 MUIR-TORRE SYNDROME: Status: ACTIVE | Noted: 2024-08-06

## 2024-08-06 PROBLEM — K63.5 POLYP OF COLON: Status: ACTIVE | Noted: 2022-06-28

## 2024-08-06 PROBLEM — M81.8 OTHER OSTEOPOROSIS WITHOUT CURRENT PATHOLOGICAL FRACTURE: Status: ACTIVE | Noted: 2024-08-06

## 2024-08-06 LAB
ALBUMIN SERPL BCG-MCNC: 4.3 G/DL (ref 3.5–5.2)
ALP SERPL-CCNC: 111 U/L (ref 40–150)
ALT SERPL W P-5'-P-CCNC: 26 U/L (ref 0–50)
ANION GAP SERPL CALCULATED.3IONS-SCNC: 9 MMOL/L (ref 7–15)
AST SERPL W P-5'-P-CCNC: 34 U/L (ref 0–45)
BILIRUB SERPL-MCNC: 0.2 MG/DL
BUN SERPL-MCNC: 17.9 MG/DL (ref 8–23)
CALCIUM SERPL-MCNC: 9.3 MG/DL (ref 8.8–10.4)
CHLORIDE SERPL-SCNC: 108 MMOL/L (ref 98–107)
CHOLEST SERPL-MCNC: 250 MG/DL
CREAT SERPL-MCNC: 0.8 MG/DL (ref 0.51–0.95)
EGFRCR SERPLBLD CKD-EPI 2021: 83 ML/MIN/1.73M2
FASTING STATUS PATIENT QL REPORTED: NO
FASTING STATUS PATIENT QL REPORTED: NO
GLUCOSE SERPL-MCNC: 80 MG/DL (ref 70–99)
HBA1C MFR BLD: 5.6 % (ref 0–5.6)
HCO3 SERPL-SCNC: 26 MMOL/L (ref 22–29)
HDLC SERPL-MCNC: 43 MG/DL
LDLC SERPL CALC-MCNC: 143 MG/DL
NONHDLC SERPL-MCNC: 207 MG/DL
POTASSIUM SERPL-SCNC: 4.8 MMOL/L (ref 3.4–5.3)
PROT SERPL-MCNC: 7.1 G/DL (ref 6.4–8.3)
SODIUM SERPL-SCNC: 143 MMOL/L (ref 135–145)
TRIGL SERPL-MCNC: 319 MG/DL
TSH SERPL DL<=0.005 MIU/L-ACNC: 0.92 UIU/ML (ref 0.3–4.2)
VIT D+METAB SERPL-MCNC: 39 NG/ML (ref 20–50)

## 2024-08-06 PROCEDURE — 36415 COLL VENOUS BLD VENIPUNCTURE: CPT | Performed by: PHYSICIAN ASSISTANT

## 2024-08-06 PROCEDURE — 84443 ASSAY THYROID STIM HORMONE: CPT | Performed by: PHYSICIAN ASSISTANT

## 2024-08-06 PROCEDURE — 80053 COMPREHEN METABOLIC PANEL: CPT | Performed by: PHYSICIAN ASSISTANT

## 2024-08-06 PROCEDURE — 82306 VITAMIN D 25 HYDROXY: CPT | Performed by: PHYSICIAN ASSISTANT

## 2024-08-06 PROCEDURE — 83036 HEMOGLOBIN GLYCOSYLATED A1C: CPT | Performed by: PHYSICIAN ASSISTANT

## 2024-08-06 PROCEDURE — 80061 LIPID PANEL: CPT | Performed by: PHYSICIAN ASSISTANT

## 2024-08-06 PROCEDURE — 99214 OFFICE O/P EST MOD 30 MIN: CPT | Performed by: PHYSICIAN ASSISTANT

## 2024-08-06 RX ORDER — LEVOTHYROXINE SODIUM 50 UG/1
50 TABLET ORAL DAILY
Qty: 90 TABLET | Refills: 3 | OUTPATIENT
Start: 2024-08-06

## 2024-08-06 RX ORDER — LORAZEPAM 0.5 MG/1
0.5 TABLET ORAL 3 TIMES DAILY PRN
Qty: 30 TABLET | Refills: 1 | Status: CANCELLED | OUTPATIENT
Start: 2024-08-06

## 2024-08-06 RX ORDER — LEVOTHYROXINE SODIUM 50 UG/1
50 TABLET ORAL DAILY
Qty: 90 TABLET | Refills: 3 | Status: CANCELLED | OUTPATIENT
Start: 2024-08-06

## 2024-08-06 RX ORDER — LEVOTHYROXINE SODIUM 50 UG/1
50 TABLET ORAL DAILY
Qty: 90 TABLET | Refills: 0 | Status: SHIPPED | OUTPATIENT
Start: 2024-08-06

## 2024-08-06 RX ORDER — LORAZEPAM 0.5 MG/1
0.5 TABLET ORAL 3 TIMES DAILY PRN
Qty: 30 TABLET | Refills: 0 | Status: SHIPPED | OUTPATIENT
Start: 2024-08-06

## 2024-08-06 ASSESSMENT — ANXIETY QUESTIONNAIRES
IF YOU CHECKED OFF ANY PROBLEMS ON THIS QUESTIONNAIRE, HOW DIFFICULT HAVE THESE PROBLEMS MADE IT FOR YOU TO DO YOUR WORK, TAKE CARE OF THINGS AT HOME, OR GET ALONG WITH OTHER PEOPLE: NOT DIFFICULT AT ALL
GAD7 TOTAL SCORE: 0
1. FEELING NERVOUS, ANXIOUS, OR ON EDGE: NOT AT ALL
GAD7 TOTAL SCORE: 0
3. WORRYING TOO MUCH ABOUT DIFFERENT THINGS: NOT AT ALL
6. BECOMING EASILY ANNOYED OR IRRITABLE: NOT AT ALL
7. FEELING AFRAID AS IF SOMETHING AWFUL MIGHT HAPPEN: NOT AT ALL
7. FEELING AFRAID AS IF SOMETHING AWFUL MIGHT HAPPEN: NOT AT ALL
4. TROUBLE RELAXING: NOT AT ALL
8. IF YOU CHECKED OFF ANY PROBLEMS, HOW DIFFICULT HAVE THESE MADE IT FOR YOU TO DO YOUR WORK, TAKE CARE OF THINGS AT HOME, OR GET ALONG WITH OTHER PEOPLE?: NOT DIFFICULT AT ALL
5. BEING SO RESTLESS THAT IT IS HARD TO SIT STILL: NOT AT ALL
2. NOT BEING ABLE TO STOP OR CONTROL WORRYING: NOT AT ALL

## 2024-08-06 ASSESSMENT — PAIN SCALES - GENERAL: PAINLEVEL: NO PAIN (0)

## 2024-08-06 NOTE — TELEPHONE ENCOUNTER
Spoke with Pt and she is going into [facility] for her thyroid meds and will be Est Care there as well.      Routing to RN pool to decline RX.    Delgado Vora

## 2024-08-06 NOTE — PROGRESS NOTES
Assessment & Plan     Acquired hypothyroidism  Obtain labs.  - TSH with free T4 reflex; Future  - levothyroxine (SYNTHROID/LEVOTHROID) 50 MCG tablet; Take 1 tablet (50 mcg) by mouth daily  - TSH with free T4 reflex    Anxiety  Refills given.  - LORazepam (ATIVAN) 0.5 MG tablet; Take 1 tablet (0.5 mg) by mouth 3 times daily as needed for anxiety    Screening for cholesterol level  - Lipid panel reflex to direct LDL Fasting; Future  - Lipid panel reflex to direct LDL Fasting    Prediabetes  - Hemoglobin A1c; Future  - Comprehensive metabolic panel (BMP + Alb, Alk Phos, ALT, AST, Total. Bili, TP); Future  - Hemoglobin A1c  - Comprehensive metabolic panel (BMP + Alb, Alk Phos, ALT, AST, Total. Bili, TP)    Encounter for vitamin deficiency screening  - Vitamin D Deficiency; Future  - Vitamin D Deficiency    Patient returning for px in one month.    Melinda Lunsford is a 64 year old, presenting for the following health issues:      HPI   Hypertension Follow-up    Do you check your blood pressure regularly outside of the clinic? Yes sometimes   Are you following a low salt diet? Yes  Are your blood pressures ever more than 140 on the top number (systolic) OR more   than 90 on the bottom number (diastolic), for example 140/90? No not recent     Hypothyroidism Follow-up   --patient needs refills.   Px next month. Will obtain labs.     Since last visit, patient describes the following symptoms: Weight stable, no hair loss, no skin changes, no constipation, no loose stools and anxiety  How many servings of fruits and vegetables do you eat daily?  0-1  On average, how many sweetened beverages do you drink each day (Examples: soda, juice, sweet tea, etc.  Do NOT count diet or artificially sweetened beverages)?   1  How many days per week do you exercise enough to make your heart beat faster? 3 or less  How many minutes a day do you exercise enough to make your heart beat faster? 30 - 60  How many days per week do you miss  "taking your medication? 0      Review of Systems  Constitutional, HEENT, cardiovascular, pulmonary, gi and gu systems are negative, except as otherwise noted.      Objective    /70   Pulse 97   Temp 97.9  F (36.6  C) (Tympanic)   Resp 18   Ht 1.651 m (5' 5\")   LMP  (LMP Unknown)   SpO2 99%   BMI 27.82 kg/m    Body mass index is 27.82 kg/m .  Physical Exam   GENERAL: alert and no distress  EYES: Eyes grossly normal to inspection, PERRL and conjunctivae and sclerae normal  RESP: lungs clear to auscultation - no rales, rhonchi or wheezes  CV: regular rate and rhythm, normal S1 S2, no S3 or S4, no murmur, click or rub, no peripheral edema      Results for orders placed or performed in visit on 08/06/24   Hemoglobin A1c     Status: Normal   Result Value Ref Range    Hemoglobin A1C 5.6 0.0 - 5.6 %           Signed Electronically by: Maria G Gutierrez PA-C    "

## 2024-09-23 ENCOUNTER — OFFICE VISIT (OUTPATIENT)
Dept: PEDIATRICS | Facility: CLINIC | Age: 64
End: 2024-09-23
Payer: COMMERCIAL

## 2024-09-23 VITALS
OXYGEN SATURATION: 99 % | SYSTOLIC BLOOD PRESSURE: 150 MMHG | TEMPERATURE: 98.4 F | WEIGHT: 166.4 LBS | HEIGHT: 65 IN | HEART RATE: 82 BPM | RESPIRATION RATE: 15 BRPM | DIASTOLIC BLOOD PRESSURE: 66 MMHG | BODY MASS INDEX: 27.72 KG/M2

## 2024-09-23 DIAGNOSIS — I10 ESSENTIAL HYPERTENSION: ICD-10-CM

## 2024-09-23 DIAGNOSIS — H54.7 WORSENING VISION: ICD-10-CM

## 2024-09-23 DIAGNOSIS — Z00.00 ROUTINE GENERAL MEDICAL EXAMINATION AT A HEALTH CARE FACILITY: Primary | ICD-10-CM

## 2024-09-23 PROBLEM — Z86.0100 HISTORY OF COLONIC POLYPS: Status: ACTIVE | Noted: 2024-04-17

## 2024-09-23 PROCEDURE — 90750 HZV VACC RECOMBINANT IM: CPT | Performed by: STUDENT IN AN ORGANIZED HEALTH CARE EDUCATION/TRAINING PROGRAM

## 2024-09-23 PROCEDURE — 90471 IMMUNIZATION ADMIN: CPT | Performed by: STUDENT IN AN ORGANIZED HEALTH CARE EDUCATION/TRAINING PROGRAM

## 2024-09-23 PROCEDURE — 99396 PREV VISIT EST AGE 40-64: CPT | Mod: 25 | Performed by: STUDENT IN AN ORGANIZED HEALTH CARE EDUCATION/TRAINING PROGRAM

## 2024-09-23 ASSESSMENT — PAIN SCALES - GENERAL: PAINLEVEL: MILD PAIN (3)

## 2024-09-23 NOTE — PROGRESS NOTES
"Preventive Care Visit  [facility] TOM Alex MD, Internal Medicine  Sep 23, 2024      Assessment & Plan     Routine general medical examination at a health care facility  Presents today for her annual visit.     Hypertension  Blood pressure elevated today, however was in the normal range during her visit last month.  She does have a blood pressure cuff at home.  Discussed checking at home and if >140 or >90 would need visit sooner.  Will also plan to have her come back in 1-2 months      Worsening vision  Notes that she wears glasses but hasn't had her eyes checked recently and has noticed a slow decline in vision.  She is interested in seeing an eye doctor for a repeat eye exam,.   - Adult Eye  Referral; Future            BMI  Estimated body mass index is 28.06 kg/m  as calculated from the following:    Height as of this encounter: 1.64 m (5' 4.57\").    Weight as of this encounter: 75.5 kg (166 lb 6.4 oz).       Counseling  Appropriate preventive services were addressed with this patient via screening, questionnaire, or discussion as appropriate for fall prevention, nutrition, physical activity, Tobacco-use cessation, social engagement, weight loss and cognition.  Checklist reviewing preventive services available has been given to the patient.  Reviewed patient's diet, addressing concerns and/or questions.           Melinda Lunsford is a 64 year old, presenting for the following:  Physical        9/23/2024    12:49 PM   Additional Questions   Roomed by Melly SUTTON   Accompanied by ALICE         9/23/2024    12:49 PM   Patient Reported Additional Medications   Patient reports taking the following new medications no        Health Care Directive  Patient does not have a Health Care Directive or Living Will: Patient states has Advance Directive and will bring in a copy to clinic.    Healthy Habits:     Getting at least 3 servings of Calcium per day:  Yes    Bi-annual eye exam:  NO    " Dental care twice a year:  Yes    Sleep apnea or symptoms of sleep apnea:  None    Diet:  Regular (no restrictions)    Frequency of exercise:  4-5 days/week    Duration of exercise:  Greater than 60 minutes    Taking medications regularly:  Yes    Barriers to taking medications:  None    Medication side effects:  None    Additional concerns today:  Yes (headaches, eye pressure and memory issues)    Sees therapist     [place]     Doesn't really check blood pressure at home     Occasional frontal headaches and pressure behind eyes.  Happen intermittently and do not interfere with activities     Usually occur in the evenings     No eye exam in a couple years and wonders whether her vision has slowly been getting worse    Takes ativan a couple times per month                9/23/2024   General Health   How would you rate your overall physical health? Good   Feel stress (tense, anxious, or unable to sleep) To some extent      (!) STRESS CONCERN      9/23/2024   Nutrition   Three or more servings of calcium each day? Yes   Diet: Regular (no restrictions)   How many servings of fruit and vegetables per day? (!) 0-1   How many sweetened beverages each day? 0-1            9/23/2024   Exercise   Days per week of moderate/strenous exercise 5 days   Average minutes spent exercising at this level Patient declined            9/23/2024   Social Factors   Frequency of gathering with friends or relatives More than three times a week   Worry food won't last until get money to buy more No   Food not last or not have enough money for food? No   Do you have housing? (Housing is defined as stable permanent housing and does not include staying ouside in a car, in a tent, in an abandoned building, in an overnight shelter, or couch-surfing.) Yes   Are you worried about losing your housing? No   Lack of transportation? No   Unable to get utilities (heat,electricity)? No            9/23/2024   Fall Risk   Fallen 2 or more times  in the past year? No   Trouble with walking or balance? Yes   Gait Speed Test (Document in seconds) 4   Gait Speed Test Interpretation Less than or equal to 5.00 seconds - PASS             2024   Dental   Dentist two times every year? Yes            2024   TB Screening   Were you born outside of the [place]? No            Today's PHQ-9 Score:       2024     2:27 PM   PHQ-9 SCORE   PHQ-9 Total Score MyChart 0   PHQ-9 Total Score 0           2024   Substance Use   Alcohol more than 3/day or more than 7/wk No   Do you use any other substances recreationally? (!) CANNABIS PRODUCTS        Social History     Tobacco Use    Smoking status: Former     Current packs/day: 0.00     Average packs/day: 0.5 packs/day for 10.0 years (5.0 ttl pk-yrs)     Types: Cigarettes     Quit date: 2023     Years since quittin.7     Passive exposure: Current    Smokeless tobacco: Never    Tobacco comments:     Casual smoker by hx   Vaping Use    Vaping status: Never Used   Substance Use Topics    Alcohol use: No    Drug use: No           2023   LAST FHS-7 RESULTS   1st degree relative breast or ovarian cancer Yes   Any relative bilateral breast cancer No   Any male have breast cancer No   Any ONE woman have BOTH breast AND ovarian cancer No   Any woman with breast cancer before 50yrs No   2 or more relatives with breast AND/OR ovarian cancer No   2 or more relatives with breast AND/OR bowel cancer No                   2024   STI Screening   New sexual partner(s) since last STI/HIV test? No        History of abnormal Pap smear: No - age 30-64 HPV with reflex Pap every 5 years recommended        2017    12:00 AM   PAP / HPV   HPV_EXT - HISTORICAL See Scanned Report      ASCVD Risk   The 10-year ASCVD risk score (Nichelle WILLINGHAM, et al., 2019) is: 12.2%    Values used to calculate the score:      Age: 64 years      Sex: Female      Is Non- : No      Diabetic: No      Tobacco  "smoker: No      Systolic Blood Pressure: 168 mmHg      Is BP treated: Yes      HDL Cholesterol: 43 mg/dL      Total Cholesterol: 250 mg/dL           Reviewed and updated as needed this visit by Provider                             Objective    Exam  BP (!) 168/87 (BP Location: Right arm, Patient Position: Sitting, Cuff Size: Adult Regular)   Pulse 82   Temp 98.4  F (36.9  C) (Oral)   Resp 15   Ht 1.64 m (5' 4.57\")   Wt 75.5 kg (166 lb 6.4 oz)   LMP  (LMP Unknown)   SpO2 99%   BMI 28.06 kg/m     Estimated body mass index is 28.06 kg/m  as calculated from the following:    Height as of this encounter: 1.64 m (5' 4.57\").    Weight as of this encounter: 75.5 kg (166 lb 6.4 oz).    Physical Exam  GENERAL: alert and no distress  EYES: Eyes grossly normal to inspection, PERRL and conjunctivae and sclerae normal  HENT: ear canals and TM's normal, nose and mouth without ulcers or lesions  NECK: no adenopathy, no asymmetry, masses, or scars  RESP: lungs clear to auscultation - no rales, rhonchi or wheezes  CV: regular rate and rhythm, normal S1 S2, no S3 or S4, no murmur, click or rub, no peripheral edema  ABDOMEN: soft, nontender, no hepatosplenomegaly, no masses  MS: no gross musculoskeletal defects noted, no edema  SKIN: no suspicious lesions or rashes  NEURO: Normal strength and tone, mentation intact and speech normal  PSYCH: mentation appears normal, affect normal/bright        Signed Electronically by: Cathleen Alex MD    "

## 2024-09-23 NOTE — PATIENT INSTRUCTIONS
Patient Education   Preventive Care Advice   This is general advice given by our system to help you stay healthy. However, your care team may have specific advice just for you. Please talk to your care team about your preventive care needs.  Nutrition  Eat 5 or more servings of fruits and vegetables each day.  Try wheat bread, brown rice and whole grain pasta (instead of white bread, rice, and pasta).  Get enough calcium and vitamin D. Check the label on foods and aim for 100% of the RDA (recommended daily allowance).  Lifestyle  Exercise at least 150 minutes each week  (30 minutes a day, 5 days a week).  Do muscle strengthening activities 2 days a week. These help control your weight and prevent disease.  No smoking.  Wear sunscreen to prevent skin cancer.  Have a dental exam and cleaning every 6 months.  Yearly exams  See your health care team every year to talk about:  Any changes in your health.  Any medicines your care team has prescribed.  Preventive care, family planning, and ways to prevent chronic diseases.  Shots (vaccines)   HPV shots (up to age 26), if you've never had them before.  Hepatitis B shots (up to age 59), if you've never had them before.  COVID-19 shot: Get this shot when it's due.  Flu shot: Get a flu shot every year.  Tetanus shot: Get a tetanus shot every 10 years.  Pneumococcal, hepatitis A, and RSV shots: Ask your care team if you need these based on your risk.  Shingles shot (for age 50 and up)  General health tests  Diabetes screening:  Starting at age 35, Get screened for diabetes at least every 3 years.  If you are younger than age 35, ask your care team if you should be screened for diabetes.  Cholesterol test: At age 39, start having a cholesterol test every 5 years, or more often if advised.  Bone density scan (DEXA): At age 50, ask your care team if you should have this scan for osteoporosis (brittle bones).  Hepatitis C: Get tested at least once in your life.  STIs (sexually  transmitted infections)  Before age 24: Ask your care team if you should be screened for STIs.  After age 24: Get screened for STIs if you're at risk. You are at risk for STIs (including HIV) if:  You are sexually active with more than one person.  You don't use condoms every time.  You or a partner was diagnosed with a sexually transmitted infection.  If you are at risk for HIV, ask about PrEP medicine to prevent HIV.  Get tested for HIV at least once in your life, whether you are at risk for HIV or not.  Cancer screening tests  Cervical cancer screening: If you have a cervix, begin getting regular cervical cancer screening tests starting at age 21.  Breast cancer scan (mammogram): If you've ever had breasts, begin having regular mammograms starting at age 40. This is a scan to check for breast cancer.  Colon cancer screening: It is important to start screening for colon cancer at age 45.  Have a colonoscopy test every 10 years (or more often if you're at risk) Or, ask your provider about stool tests like a FIT test every year or Cologuard test every 3 years.  To learn more about your testing options, visit:   .  For help making a decision, visit:   https://bit.ly/zk74916.  Prostate cancer screening test: If you have a prostate, ask your care team if a prostate cancer screening test (PSA) at age 55 is right for you.  Lung cancer screening: If you are a current or former smoker ages 50 to 80, ask your care team if ongoing lung cancer screenings are right for you.  For informational purposes only. Not to replace the advice of your health care provider. Copyright   2023 [facility]. All rights reserved. Clinically reviewed by the [facility]. [id number] - REV 01/24.  Preventing Falls: Care Instructions  Injuries and health problems such as trouble walking or poor eyesight can increase your risk of falling. So can some medicines. But there are things you can do to help  "prevent falls. You can exercise to get stronger. You can also arrange your home to make it safer.    Talk to your doctor about the medicines you take. Ask if any of them increase the risk of falls and whether they can be changed or stopped.   Try to exercise regularly. It can help improve your strength and balance. This can help lower your risk of falling.     Practice fall safety and prevention.    Wear low-heeled shoes that fit well and give your feet good support. Talk to your doctor if you have foot problems that make this hard.  Carry a cellphone or wear a medical alert device that you can use to call for help.  Use stepladders instead of chairs to reach high objects. Don't climb if you're at risk for falls. Ask for help, if needed.  Wear the correct eyeglasses, if you need them.    Make your home safer.    Remove rugs, cords, clutter, and furniture from walkways.  Keep your house well lit. Use night-lights in hallways and bathrooms.  Install and use sturdy handrails on stairways.  Wear nonskid footwear, even inside. Don't walk barefoot or in socks without shoes.    Be safe outside.    Use handrails, curb cuts, and ramps whenever possible.  Keep your hands free by using a shoulder bag or backpack.  Try to walk in well-lit areas. Watch out for uneven ground, changes in pavement, and debris.  Be careful in the winter. Walk on the grass or gravel when sidewalks are slippery. Use de-icer on steps and walkways. Add non-slip devices to shoes.    Put grab bars and nonskid mats in your shower or tub and near the toilet. Try to use a shower chair or bath bench when bathing.   Get into a tub or shower by putting in your weaker leg first. Get out with your strong side first. Have a phone or medical alert device in the bathroom with you.   Where can you learn more?  Go to https://www.[employer].net/patiented  Enter G117 in the search box to learn more about \"Preventing Falls: Care Instructions.\"  Current as of: July 17, " 2023               Content Version: 14.0    8975-4712 [employer].   Care instructions adapted under license by your healthcare professional. If you have questions about a medical condition or this instruction, always ask your healthcare professional. [employer] disclaims any warranty or liability for your use of this information.      Substance Use Disorder: Care Instructions  Overview     You can improve your life and health by stopping your use of alcohol or drugs. When you don't drink or use drugs, you may feel and sleep better. You may get along better with your family, friends, and coworkers. There are medicines and programs that can help with substance use disorder.  How can you care for yourself at home?  Here are some ways to help you stay sober and prevent relapse.  If you have been given medicine to help keep you sober or reduce your cravings, be sure to take it exactly as prescribed.  Talk to your doctor about programs that can help you stop using drugs or drinking alcohol.  Do not keep alcohol or drugs in your home.  Plan ahead. Think about what you'll say if other people ask you to drink or use drugs. Try not to spend time with people who drink or use drugs.  Use the time and money spent on drinking or drugs to do something that's important to you.  Preventing a relapse  Have a plan to deal with relapse. Learn to recognize changes in your thinking that lead you to drink or use drugs. Get help before you start to drink or use drugs again.  Try to stay away from situations, friends, or places that may lead you to drink or use drugs.  If you feel the need to drink alcohol or use drugs again, seek help right away. Call a trusted friend or family member. Some people get support from organizations such as Narcotics Anonymous or [employer] or from treatment facilities.  If you relapse, get help as soon as you can. Some people make a plan with another person that outlines what  they want that person to do for them if they relapse. The plan usually includes how to handle the relapse and who to notify in case of relapse.  Don't give up. Remember that a relapse doesn't mean that you have failed. Use the experience to learn the triggers that lead you to drink or use drugs. Then quit again. Recovery is a lifelong process. Many people have several relapses before they are able to quit for good.  Follow-up care is a key part of your treatment and safety. Be sure to make and go to all appointments, and call your doctor if you are having problems. It's also a good idea to know your test results and keep a list of the medicines you take.  When should you call for help?   Call 911  anytime you think you may need emergency care. For example, call if you or someone else:    Has overdosed or has withdrawal signs. Be sure to tell the emergency workers that you are or someone else is using or trying to quit using drugs. Overdose or withdrawal signs may include:  Losing consciousness.  Seizure.  Seeing or hearing things that aren't there (hallucinations).     Is thinking or talking about suicide or harming others.   Where to get help 24 hours a day, 7 days a week   If you or someone you know talks about suicide, self-harm, a mental health crisis, a substance use crisis, or any other kind of emotional distress, get help right away. You can:    Call the Suicide and Crisis Lifeline at 379.     Call 4-490-521-LBOA (1-769.909.8010).     Text HOME to 205707 to access the Crisis Text Line.   Consider saving these numbers in your phone.  Go to [email or web address] for more information or to chat online.  Call your doctor now or seek immediate medical care if:    You are having withdrawal symptoms. These may include nausea or vomiting, sweating, shakiness, and anxiety.   Watch closely for changes in your health, and be sure to contact your doctor if:    You have a relapse.     You need more help or support to stop.  "  Where can you learn more?  Go to https://www.[employer].net/patiented  Enter H573 in the search box to learn more about \"Substance Use Disorder: Care Instructions.\"  Current as of: November 15, 2023               Content Version: 14.0    0720-6605 [employer].   Care instructions adapted under license by your healthcare professional. If you have questions about a medical condition or this instruction, always ask your healthcare professional. [employer] disclaims any warranty or liability for your use of this information.         "

## 2024-09-27 DIAGNOSIS — F41.9 ANXIETY: ICD-10-CM

## 2024-09-27 DIAGNOSIS — E03.9 ACQUIRED HYPOTHYROIDISM: ICD-10-CM

## 2024-09-27 RX ORDER — SERTRALINE HYDROCHLORIDE 100 MG/1
TABLET, FILM COATED ORAL
Qty: 180 TABLET | Refills: 1 | Status: SHIPPED | OUTPATIENT
Start: 2024-09-27

## 2024-09-27 RX ORDER — LEVOTHYROXINE SODIUM 50 UG/1
50 TABLET ORAL DAILY
Qty: 90 TABLET | Refills: 0 | OUTPATIENT
Start: 2024-09-27

## 2024-10-04 ENCOUNTER — MYC MEDICAL ADVICE (OUTPATIENT)
Dept: PEDIATRICS | Facility: CLINIC | Age: 64
End: 2024-10-04
Payer: COMMERCIAL

## 2024-10-04 ENCOUNTER — NURSE TRIAGE (OUTPATIENT)
Dept: PEDIATRICS | Facility: CLINIC | Age: 64
End: 2024-10-04
Payer: COMMERCIAL

## 2024-10-04 DIAGNOSIS — U07.1 INFECTION DUE TO 2019 NOVEL CORONAVIRUS: Primary | ICD-10-CM

## 2024-10-04 NOTE — TELEPHONE ENCOUNTER
Nurse Triage SBAR    Is this a 2nd Level Triage? NO    Situation: Pt calls to report positive covid test and request paxlovid.     Background: Pt states she was recently spending time with friends who have now tested positive for covid. Pt reports symptoms began yesterday 10/3, and she states she tested positive for covid today 10/4 via home test. Pt denies checking temperature. Pt denies having pulse ox at home. Last covid vaccine dec 2021 per chart review. Pt denies history of heart or lung disease. Pt reports that last night she took Nyquil and ibuprofen and slept well.      Assessment: Pt endorses fatigue, aching muscles and joints, sinus congestion, mild headache, chills, feeling feverish, occasional dry cough. Pt denies stiff neck SOB, difficulty breathing or chest pain.     Protocol Recommended Disposition:   No disposition on file.    Recommendation: Pt requesting Paxlovid and home cares. Pt denies reg flag symptoms at this time. See RN Paxlovid protocol below. Advised on symptomatic home cares. Discussed Paxlovid education per below. Advised calling back or seeking emergent care for new/worsening symptoms including shortness of breath or chest pain.       RN COVID TREATMENT VISIT  10/04/24      The patient has been triaged and does not require a higher level of care.    Jonn GOOD Walter  64 year old  Current weight? 168 lbs    Has the patient been seen by a primary care provider at an [facility] or [facility] within the past two years? Yes.   Have you been in close proximity to/do you have a known exposure to a person with a confirmed case of influenza? No.     General treatment eligibility:  Date of positive COVID test (PCR or at home)?  10/4    Are you or have you been hospitalized for this COVID-19 infection? No.   Have you received monoclonal antibodies or antiviral treatment for COVID-19 since this positive test? No.   Do you have any of the following conditions that place you  at risk of being very sick from COVID-19?   - Age 50 years or older  - Heart conditions such as cardiomyopathies, congenital heart defects, coronary artery disease, heart arrhythmias, heart failure, hypertension, valve disorders   - Mental health disorders including mood disorders, depression, schizophrenia spectrum disorders   - Current or former smoker  Yes, patient has at least one high risk condition as noted above.     Current COVID symptoms:   - fever or chills  - cough  - fatigue  - muscle or body aches  - headache  - congestion or runny nose  Yes. Patient has at least one symptom as selected.     How many days since symptoms started? 5 days or less. Established patient, 12 years or older weighing at least 88.2 lbs, who has symptoms that started in the past 5 days, has not been hospitalized nor received treatment already, and is at risk for being very sick from COVID-19.     Treatment eligibility by RN:  Are you currently pregnant or nursing? No  Do you have a clinically significant hypersensitivity to nirmatrelvir or ritonavir, or toxic epidermal necrolysis (TEN) or Wright-Efe Syndrome? No  Do you have a history of hepatitis, any hepatic impairment on the Problem List (such as Child-Atkinson Class C, cirrhosis, fatty liver disease, alcoholic liver disease), or was the last liver lab (hepatic panel, ALT, AST, ALK Phos, bilirubin) elevated in the past 6 months? No  Do you have any history of severe renal impairment (eGFR < 30mL/min)? No    Is patient eligible to continue? Yes, patient meets all eligibility requirements for the RN COVID treatment (as denoted by all no responses above).     Current Outpatient Medications   Medication Sig Dispense Refill    atenolol (TENORMIN) 50 MG tablet Take 1 tablet (50 mg) by mouth daily 90 tablet 3    calcium carbonate-vitamin D3 (CALCIUM 600 WITH VITAMIN D3) 600 mg(1,500mg) -400 unit cap       diclofenac (VOLTAREN) 1 % topical gel APPLY SPARINGLY TO THE AFFECTED AREA ONCE  DAILY. (Patient not taking: Reported on 12/20/2023)      Glucos-MSM-C-Bh-Bbckfj-Mgfyns (GLUCOSAMINE MSM COMPLEX) TABS tablet       hydrOXYzine (ATARAX) 10 MG tablet TAKE 1 TO 1 AND 1/2 TABLETS BY MOUTH AT BEDTIME AS NEEDED. 90 tablet 3    ibuprofen (ADVIL/MOTRIN) 600 MG tablet Take 1 tablet by mouth 3 times daily      levothyroxine (SYNTHROID/LEVOTHROID) 50 MCG tablet Take 1 tablet (50 mcg) by mouth daily 90 tablet 0    LORazepam (ATIVAN) 0.5 MG tablet Take 1 tablet (0.5 mg) by mouth 3 times daily as needed for anxiety 30 tablet 0    omeprazole (PRILOSEC OTC) 20 MG EC tablet Take 20 mg by mouth daily      sertraline (ZOLOFT) 100 MG tablet TAKE 2 TABLETS(200 MG) BY MOUTH DAILY 180 tablet 1    triamcinolone (KENALOG) 0.5 % external ointment Apply 1 g topically 2 times daily 30 g 11    valACYclovir (VALTREX) 1000 mg tablet Take 1 tablet (1,000 mg) by mouth 2 times daily 14 tablet 4     Pt states she is no longer using diclofenac or glucosamine. Pt reports lorazepam is PRN, not taken in a while. Pt reports not using triamcinolone for a while.     Medications from List 1 of the standing order (on medications that exclude the use of Paxlovid) that patient is taking: NONE. Is patient taking [place]'s Wort? No  Is patient taking David's Wort or any meds from List 1? No.   Medications from List 2 of the standing order (on meds that provider needs to adjust) that patient is taking: NONE. Is patient on any of the meds from List 2? No.   Medications from List 3 of standing order (on meds that a RN needs to adjust) that patient is taking: NONE. Is patient on any meds from List 3? No.     Paxlovid has an approximate 90% reduction in hospitalization. Paxlovid can possibly cause altered sense of taste, diarrhea (loose, watery stools), high blood pressure, muscle aches.     Would patient like a Paxlovid prescription?   Yes.   Lab Results   Component Value Date    GFRESTIMATED 83 08/06/2024       Was last eGFR reduced? No, eGFR  60 or greater/ No Result on record. Patient can receive the normal renal function dose. Paxlovid Rx sent to [place] [facility]   [facility] on [address]     Temporary change to home medications: None    All medication adjustments (holds, etc) were discussed with the patient and patient was asked to repeat back (teachback) their med adjustment.  Did patient understand med adjustment? No medication adjustments needed.         Reviewed the following instructions with the patient:    Paxlovid (nimatrelvir and ritonavir)    How it works  Two medicines (nirmatrelvir and ritonavir) are taken together. They stop the virus from growing. Less amount of virus is easier for your body to fight.    How to take  Medicine comes in a daily container with both medicine tablets. Take by mouth twice daily (once in the morning, once at night) for 5 days.  The number of tablets to take varies by patient.  Don't chew or break capsules. Swallow whole.    When to take  Take as soon as possible after positive COVID-19 test result, and within 5 days of your first symptoms.    Possible side effects  Can cause altered sense of taste, diarrhea (loose, watery stools), high blood pressure, muscle aches.    Honorio Mcelroy, RN        Additional Information   Negative: SEVERE difficulty breathing (e.g., struggling for each breath, speaks in single words)   Negative: Difficult to awaken or acting confused (e.g., disoriented, slurred speech)   Negative: Bluish (or gray) lips or face now   Negative: Shock suspected (e.g., cold/pale/clammy skin, too weak to stand, low BP, rapid pulse)   Negative: Sounds like a life-threatening emergency to the triager   Negative: [1] Diagnosed or suspected COVID-19 AND [2] symptoms lasting 3 or more weeks   Negative: [1] COVID-19 exposure AND [2] no symptoms   Negative: COVID-19 vaccine reaction suspected (e.g., fever, headache, muscle aches) occurring 1 to 3 days after getting vaccine   Negative: COVID-19 vaccine,  questions about   Negative: [1] Exposure to someone known to have influenza (flu test positive) AND [2] flu-like symptoms (e.g., cough, runny nose, sore throat, SOB; with or without fever)   Negative: [1] Possible COVID-19 symptoms AND [2] triager concerned about severity of symptoms or other causes   Negative: COVID-19 and breastfeeding, questions about   Negative: SEVERE or constant chest pain or pressure  (Exception: Mild central chest pain, present only when coughing.)   Negative: MODERATE difficulty breathing (e.g., speaks in phrases, SOB even at rest, pulse 100-120)   Negative: [1] Headache AND [2] stiff neck (can't touch chin to chest)   Negative: Oxygen level (e.g., pulse oximetry) 90% or lower   Negative: Chest pain or pressure  (Exception: MILD central chest pain, present only when coughing.)   Negative: [1] Drinking very little AND [2] dehydration suspected (e.g., no urine > 12 hours, very dry mouth, very lightheaded)   Negative: Patient sounds very sick or weak to the triager   Negative: MILD difficulty breathing (e.g., minimal/no SOB at rest, SOB with walking, pulse <100)   Negative: Fever > 103 F (39.4 C)   Negative: [1] Fever > 101 F (38.3 C) AND [2] age > 60 years   Negative: [1] Fever > 100 F (37.8 C) AND [2] bedridden (e.g., CVA, chronic illness, recovering from surgery)   Negative: Oxygen level (e.g., pulse oximetry) 91 to 94%    Protocols used: COVID-19 - Diagnosed or Fnonodyvl-B-COMARINA Mcelroy RN on 10/4/2024 at 3:47 PM

## 2024-10-04 NOTE — TELEPHONE ENCOUNTER
Left message for patient to call back. Advised patient via [employer] to call the clinic and speak with a nurse.  Mehnaz MARIE RN, BSN  Clinic RN  [facility]

## 2024-11-13 ENCOUNTER — TELEPHONE (OUTPATIENT)
Dept: PEDIATRICS | Facility: CLINIC | Age: 64
End: 2024-11-13

## 2024-11-13 ENCOUNTER — OFFICE VISIT (OUTPATIENT)
Dept: PEDIATRICS | Facility: CLINIC | Age: 64
End: 2024-11-13
Payer: COMMERCIAL

## 2024-11-13 VITALS
OXYGEN SATURATION: 98 % | HEIGHT: 66 IN | HEART RATE: 66 BPM | SYSTOLIC BLOOD PRESSURE: 134 MMHG | DIASTOLIC BLOOD PRESSURE: 78 MMHG | RESPIRATION RATE: 18 BRPM | BODY MASS INDEX: 27.51 KG/M2 | TEMPERATURE: 98.1 F | WEIGHT: 171.2 LBS

## 2024-11-13 DIAGNOSIS — F33.1 MODERATE EPISODE OF RECURRENT MAJOR DEPRESSIVE DISORDER (H): ICD-10-CM

## 2024-11-13 DIAGNOSIS — R53.83 FATIGUE, UNSPECIFIED TYPE: Primary | ICD-10-CM

## 2024-11-13 DIAGNOSIS — H61.21 IMPACTED CERUMEN OF RIGHT EAR: ICD-10-CM

## 2024-11-13 DIAGNOSIS — E03.9 ACQUIRED HYPOTHYROIDISM: ICD-10-CM

## 2024-11-13 PROCEDURE — 99214 OFFICE O/P EST MOD 30 MIN: CPT | Mod: 25 | Performed by: STUDENT IN AN ORGANIZED HEALTH CARE EDUCATION/TRAINING PROGRAM

## 2024-11-13 PROCEDURE — 69209 REMOVE IMPACTED EAR WAX UNI: CPT | Mod: RT | Performed by: STUDENT IN AN ORGANIZED HEALTH CARE EDUCATION/TRAINING PROGRAM

## 2024-11-13 RX ORDER — LEVOTHYROXINE SODIUM 50 UG/1
50 TABLET ORAL DAILY
Qty: 90 TABLET | Refills: 3 | Status: SHIPPED | OUTPATIENT
Start: 2024-11-13

## 2024-11-13 ASSESSMENT — PAIN SCALES - GENERAL: PAINLEVEL_OUTOF10: MILD PAIN (2)

## 2024-11-13 NOTE — TELEPHONE ENCOUNTER
Unable to draw labs today due to epic downtime.  Please call and assist with scheduling lab appointment as well as return visit to discuss additional concerns.    Thank you,  Cathleen Alex MD

## 2024-11-13 NOTE — PROGRESS NOTES
"  Assessment & Plan     Fatigue, unspecified type  Presents today for follow up.  Notes ongoing fatigue and sore muscles.  Reports a sinus headache as well as posterior leg pain.  No associated fevers, weight loss, rashes,weakness, numbness or tingling. She notes that symptoms have been ongoing for the past past 4 months.  She does take ibuprofen which is helpful.  She has been to the chiropracter in the past, but not recently. At this point will plan to obtain some basic labs to evaluate for potential etiologies of fatigue  - CBC with platelets and differential; Future  - Comprehensive metabolic panel (BMP + Alb, Alk Phos, ALT, AST, Total. Bili, TP); Future  - CK total; Future  - Erythrocyte sedimentation rate auto; Future    Acquired hypothyroidism  Stable on current dose   - levothyroxine (SYNTHROID/LEVOTHROID) 50 MCG tablet; Take 1 tablet (50 mcg) by mouth daily.    Impacted cerumen of right ear  Decreased hearing in her right ear, on exam does have cerumen in place.  Irrigation completed by MA in clinic today   - [place] REMOVAL IMPACTED CERUMEN IRRIGATION/LVG UNILAT    Moderate episode of recurrent major depressive disorder (H)  Mood symptoms well controlled on current dose of Zoloft.            BMI  Estimated body mass index is 28.06 kg/m  as calculated from the following:    Height as of this encounter: 1.664 m (5' 5.5\").    Weight as of this encounter: 77.7 kg (171 lb 3.2 oz).             Melinda Lunsford is a 64 year old, presenting for the following health issues:  RECHECK      11/13/2024     2:08 PM   Additional Questions   Roomed by Joyce Wilks CMA     History of Present Illness       Reason for visit:  Follow up sinus eyes headache neck pain fatigue, right ear issue    She eats 0-1 servings of fruits and vegetables daily.She consumes 0 sweetened beverage(s) daily.She exercises with enough effort to increase her heart rate 10 to 19 minutes per day.  She exercises with enough effort to increase her heart " "rate 3 or less days per week.   She is taking medications regularly.     Patient notes she had covid last month    Sensation of decreased hearing in the right ear and went to get hearing aids checked and was told this was likely related to cerumen     She feels that her overall energy has decreased for the past 4-5 months with some associated le soreness.  Describes aching in the back of her legs.  No numbness or tingling.  Does have some associated low back pain.      Notes that symptoms improve over the course of the day and generally feels her best at night.  No associated weight loss or rahes    She takes ibuprofen for the headache which is helpful.  No associated fevers or weakness.                      Objective    /78 (BP Location: Right arm, Cuff Size: Adult Regular)   Pulse 66   Temp 98.1  F (36.7  C) (Tympanic)   Resp 18   Ht 1.664 m (5' 5.5\")   Wt 77.7 kg (171 lb 3.2 oz)   LMP  (LMP Unknown)   SpO2 98%   BMI 28.06 kg/m    Body mass index is 28.06 kg/m .  Physical Exam   GENERAL: alert and no distress  EYES: Eyes grossly normal to inspection, PERRL and conjunctivae and sclerae normal  HENT: left ear canal and TM normal, right TM obscured by wax,  nose and mouth without ulcers or lesions  NECK: no adenopathy, no asymmetry, masses, or scars  RESP: lungs clear to auscultation - no rales, rhonchi or wheezes  CV: regular rate and rhythm, normal S1 S2, no S3 or S4, no murmur, click or rub, no peripheral edema  ABDOMEN: soft, nontender, no hepatosplenomegaly, no masses  MS: no gross musculoskeletal defects noted, no edema  SKIN: no suspicious lesions or rashes  NEURO: Normal strength and tone, mentation intact and speech normal  PSYCH: mentation appears normal, affect normal/bright            Signed Electronically by: Cathleen Alex MD    "

## 2024-11-27 ENCOUNTER — OFFICE VISIT (OUTPATIENT)
Dept: OPTOMETRY | Facility: CLINIC | Age: 64
End: 2024-11-27
Payer: COMMERCIAL

## 2024-11-27 DIAGNOSIS — H54.511A LOW VISION OF RIGHT EYE: ICD-10-CM

## 2024-11-27 DIAGNOSIS — H52.4 PRESBYOPIA: ICD-10-CM

## 2024-11-27 DIAGNOSIS — H52.203 ASTIGMATISM OF BOTH EYES, UNSPECIFIED TYPE: Primary | ICD-10-CM

## 2024-11-27 DIAGNOSIS — H16.222 KERATITIS SICCA, LEFT EYE: ICD-10-CM

## 2024-11-27 DIAGNOSIS — H17.9 CORNEAL SCAR, RIGHT EYE: ICD-10-CM

## 2024-11-27 DIAGNOSIS — H04.129 DRY EYE: ICD-10-CM

## 2024-11-27 PROCEDURE — 92015 DETERMINE REFRACTIVE STATE: CPT | Performed by: OPTOMETRIST

## 2024-11-27 PROCEDURE — 92004 COMPRE OPH EXAM NEW PT 1/>: CPT | Performed by: OPTOMETRIST

## 2024-11-27 ASSESSMENT — REFRACTION_MANIFEST
OS_AXIS: 176
OD_AXIS: 014
OS_SPHERE: -1.00
OD_CYLINDER: +9.75
OS_AXIS: 180
METHOD_AUTOREFRACTION: 1
OS_CYLINDER: +1.00
OS_CYLINDER: +1.00
OS_ADD: +2.75
OS_SPHERE: -1.00
OD_SPHERE: BALANCE
OD_SPHERE: -7.25

## 2024-11-27 ASSESSMENT — VISUAL ACUITY
OS_SC: 20/30
OS_SC+: -2
METHOD: SNELLEN - LINEAR
OS_SC: 20/30-1
OD_SC: 20/200
OD_SC: FC

## 2024-11-27 ASSESSMENT — CONF VISUAL FIELD
OS_INFERIOR_TEMPORAL_RESTRICTION: 0
OS_SUPERIOR_TEMPORAL_RESTRICTION: 0
OS_NORMAL: 1
OD_INFERIOR_NASAL_RESTRICTION: 0
OD_NORMAL: 1
OD_INFERIOR_TEMPORAL_RESTRICTION: 0
OS_INFERIOR_NASAL_RESTRICTION: 0
METHOD: COUNTING FINGERS
OS_SUPERIOR_NASAL_RESTRICTION: 0
OD_SUPERIOR_TEMPORAL_RESTRICTION: 0
OD_SUPERIOR_NASAL_RESTRICTION: 0

## 2024-11-27 ASSESSMENT — SLIT LAMP EXAM - LIDS
COMMENTS: MEIBOMIAN GLAND DYSFUNCTION, TELANGIECTASIA
COMMENTS: MEIBOMIAN GLAND DYSFUNCTION, TELANGIECTASIA

## 2024-11-27 ASSESSMENT — TONOMETRY
OS_IOP_MMHG: 18
IOP_METHOD: APPLANATION
OD_IOP_MMHG: 21

## 2024-11-27 ASSESSMENT — CUP TO DISC RATIO
OD_RATIO: 0.05
OS_RATIO: 0.25

## 2024-11-27 NOTE — PATIENT INSTRUCTIONS
DRY EYE TREATMENT    I recommend using artificial tears for your dry eye. There are over the counter drops that work well and may be used up to 4x daily. ( systane balance, complete or ultra, blink, refresh optive, soothe xp, theratears) stay away from any red eye relief products such as visine and clear eyes. Lumify can help with redness used once daily.     If you need more than 4 drops daily, use a preservative free product which come in individual vials and may be used for up to 24 hours and then discarded.   The more severe the dry eye, the more frequent instillation of artificial tears that are needed to reduce irritation/ inflammation. (Sometimes every 1-2 hours for a couple of days).  You can also add a lubricating ointment in the lower lid at bedtime. ( over the counter refresh pm, genteal gel, lacrilube etc.)    Artificial tears work best as a preventative and not as well after your eyes are starting to bother you and/ or are red.  It may take 4- 6 weeks of using the drops before you notice improvement.  If after that time you are still having problems schedule an appointment for an evaluation to discuss different treatments.    Dry eyes are a chronic condition and you may have more symptoms at certain times of the year.      Additional recommended treatment:  Warm compresses once to twice daily for 5-10 minutes  Directions for warm soaks  There are few methods for hot compresses. Moisten a washcloth with hot water, or microwave for 10 seconds, being careful to not get the cloth too hot.   Then put the washcloth onto your eyelids for 5 minutes. It will cool quickly so a rice pack or eyemask that can be heated and laid on top of the washcloth will help retain the heat.   Lid cleansing    Overabundance of bacterial microorganisms along the eyelashes and lid margins induce stress on the tear film and promote inflammation.  Regular lid hygiene helps diminish the bacterial population to prevent inflammation  Encounter Date: 7/9/2023       History     Chief Complaint   Patient presents with    Chest Pain     Started more than 3 hours ago      54 y-old  female received to ED with complaint of chest paint that started 4-5 hours PTA. She describes the pain as a tightness that goes across both arms/shoulders. History of CAD with stents X4. Patient is followed by Dr. Conte with the [facility]. Patient denies any dyspnea or HAN. States that the pain is exacerbated with movement of her arms.     Review of patient's allergies indicates:   Allergen Reactions    Atorvastatin      LIPITOR    Dog dander      Dog hair & epithelia    House dust mite      D. Ptery, D. 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    COPD (chronic obstructive pulmonary disease)     Coronary artery disease     acute MI 02/2014    CRF (chronic renal failure)     GERD (gastroesophageal reflux disease)     Hyperlipidemia     Hypertension     Myocardial infarction     Nonrheumatic mitral (valve) insufficiency 05/21/2018    Sleep apnea 01/06/2020    Stroke     Vitamin D deficiency 07/31/2020     Past Surgical History:   Procedure Laterality Date    CORONARY ANGIOPLASTY WITH STENT PLACEMENT  02/07/2014    HYSTERECTOMY      RADIOFREQUENCY ABLATION Right 10/13/2021    Procedure: RADIOFREQUENCY ABLATION;  Surgeon: David Combs MD;  Location: [facility];  Service: Pain Management;  Laterality: Right;  Right SI RFTC     Family History   Problem Relation Age of Onset    Ovarian cancer Maternal Aunt     Arthritis Mother      Social History     Tobacco Use    Smoking status: Every Day     Packs/day: 1.00      Types: Cigarettes    Smokeless tobacco: Never    Tobacco comments:     Smoker since age 18. Never had a CT of chest   Substance Use Topics    Alcohol use: Yes     Comment: social    Drug use: Never     Review of Systems   Constitutional: Negative.    HENT: Negative.     Eyes: Negative.    Respiratory: Negative.     Cardiovascular:  Positive for chest pain. Negative for palpitations and leg swelling.   Gastrointestinal: Negative.    Endocrine: Negative.    Genitourinary: Negative.    Musculoskeletal: Negative.    Skin: Negative.    Allergic/Immunologic: Negative.    Neurological: Negative.    Hematological: Negative.    Psychiatric/Behavioral: Negative.       Physical Exam     Initial Vitals   BP Pulse Resp Temp SpO2   07/09/23 2131 07/09/23 2131 07/09/23 2131 07/09/23 2132 07/09/23 2131   103/62 78 18 99.3 °F (37.4 °C) 98 %      MAP       --                Physical Exam    Nursing note and vitals reviewed.  Constitutional: She appears well-developed and well-nourished.   HENT:   Head: Normocephalic.   Right Ear: External ear normal.   Left Ear: External ear normal.   Nose: Nose normal.   Mouth/Throat: Oropharynx is clear and moist.   Eyes: Conjunctivae are normal.   Neck: Neck supple.   Normal range of motion.  Cardiovascular:  Normal rate, regular rhythm, normal heart sounds and intact distal pulses.           Pulmonary/Chest: Breath sounds normal.   Abdominal: Abdomen is soft. Bowel sounds are normal.   Musculoskeletal:         General: Normal range of motion.      Cervical back: Normal range of motion and neck supple.     Neurological: She is alert and oriented to person, place, and time. She has normal strength. GCS score is 15. GCS eye subscore is 4. GCS verbal subscore is 5. GCS motor subscore is 6.   Skin: Skin is warm and dry. Capillary refill takes less than 2 seconds.   Psychiatric: She has a normal mood and affect. Her behavior is normal. Judgment and thought content normal.       Medical Screening Exam  and infection.  Use a warm compress to loosen any crusts   Cleanse lids once daily with a lid cleansing product as directed such as Ocusoft or Sterilid which can be purchased at most pharmacies. Diluted baby shampoo will also work, but not as well as it dries the skin and can irritate eyelids.  Hypo chlor spray may also be used on closed lids.    Blink regularly  Reduce screen time if possible  Stay hydrated  Increase humidity  Wear sunglasses   No smoking/ vaping   Replace cosmetics every few months and remove daily  Avoid direct exposure to forced air--turn air vents in the car away and keep fans from blowing directly on your face          See Full Note    ED Course   Procedures  Labs Reviewed   COMPREHENSIVE METABOLIC PANEL - Abnormal; Notable for the following components:       Result Value    Anion Gap 17 (*)     Glucose 146 (*)     Creatinine 2.11 (*)     eGFR 28 (*)     All other components within normal limits   CBC WITH DIFFERENTIAL - Abnormal; Notable for the following components:    MCV 96.8 (*)     MCH 31.9 (*)     Neutrophils % 45.0 (*)     Lymphocytes % 46.3 (*)     All other components within normal limits   TROPONIN I - Normal   MAGNESIUM - Normal   CBC W/ AUTO DIFFERENTIAL    Narrative:     The following orders were created for panel order CBC auto differential.  Procedure                               Abnormality         Status                     ---------                               -----------         ------                     CBC with Differential[[id number]]        Abnormal            Final result                 Please view results for these tests on the individual orders.        ECG Results              EKG 12-lead (In process)  Result time 07/09/23 21:36:22      In process by Interface, Lab In [facility] (07/09/23 21:36:22)                   Narrative:    Test Reason : R07.9,    Vent. Rate : 076 BPM     Atrial Rate : 076 BPM     P-R Int : 138 ms          QRS Dur : 080 ms      QT Int : 406 ms       P-R-T Axes : 053 026 076 degrees     QTc Int : 456 ms    Normal sinus rhythm  Low voltage QRS  Cannot rule out Inferior infarct (cited on or before 05-AUG-2022)  Abnormal ECG  When compared with ECG of 05-AUG-2022 07:41,  T wave inversion less evident in Anterior leads    Referred By: [email or web address]   SELF           Confirmed By:                                   Imaging Results              X-Ray Chest AP Portable (In process)                      Medications   aluminum-magnesium hydroxide-simethicone 200-200-20 mg/5 mL suspension 30 mL (30 mLs Oral Given 7/9/23 2209)     And   LIDOcaine HCl 2% oral solution 15 mL (15 mLs Oral Given  7/9/23 2210)   morphine injection 4 mg (4 mg Intravenous Given 7/9/23 2236)   ondansetron injection 4 mg (4 mg Intravenous Given 7/9/23 2233)   ketorolac injection 15 mg (15 mg Intravenous Given 7/9/23 2235)     Medical Decision Making:   Initial Assessment:   54 y-old presents with chest pain that has been ongoing for >4 hours. Denies any dyspnea or HAN. EKG compated withold EKG with no obvious changes noted.   Differential Diagnosis:   -ACS  -NSTEMI  -STEMI  -costochondritis      ED Management:  Patient seems anxious and states that she has had a lot of anxiety recently and that her stepson has been in the ICU at [facility] since January of this year. States that she has followed up with her regular doctor and that they have recently increased her cymbalta. Troponin of 7.8 with pain >4 hours. Patient demonstrates that her pain is worse when she raises her hands and with arm movement. Patient also has a history of GERD. Review of old charts shows that she had chest pain on 05/23/2023 when she was seen by her PCP. I discussed her work up at length including my recommendation to f/u with Dr. Conte this week or return to the ED for any new or worsening problems or otherwise as needed. Pain is much better on discharge.                        Clinical Impression:   Final diagnoses:  [R07.9] Chest pain        ED Disposition Condition    Discharge Stable          ED Prescriptions       Medication Sig Dispense Start Date End Date Auth. Provider    hydrOXYzine pamoate (VISTARIL) 25 MG Cap Take 1 capsule (25 mg total) by mouth every 6 (six) hours as needed (anxiety). 12 capsule 7/9/2023 7/12/2023 FUAD Lerner          Follow-up Information       Follow up With Specialties Details Why Contact Info    FUAD Harris  07/09/23 [id number]       FUAD Lerner  07/09/23 2063

## 2024-11-27 NOTE — PROGRESS NOTES
Chief Complaint   Patient presents with    Annual Eye Exam      Pt injured right eye as a child and barely had vision in that eye since then    Last Eye Exam: 2 years ago   Dilated Previously: Yes, side effects of dilation explained today    What are you currently using to see?  readers       Distance Vision Acuity: Noticed gradual change in both eyes    Near Vision Acuity: Satisfied with vision while reading and using computer with readers    Eye Comfort: dry and burning   Do you use eye drops? : No      Lynda Mullen - Optometric Assistant           Medical, surgical and family histories reviewed and updated 11/27/2024.     Injury to R eye poked with a pencil  OBJECTIVE: See Ophthalmology exam    ASSESSMENT:    ICD-10-CM    1. Astigmatism of both eyes, unspecified type  H52.203       2. Presbyopia  H52.4       3. Dry eye  H04.129       4. Keratitis sicca, left eye  H16.222       5. Corneal scar, right eye  H17.9       6. Low vision of right eye  H54.511A         may have some amblyopia as there is not retinal involvement and mild corneal scarring  PLAN:   Systane PF q1-2h for a couple days , then as needed  Warm compresses/ lid hygiene, full time glasses wear for protection discussed being monocular  Systane and vitamin samples blink nutri tears         Rosibel Wilks OD

## 2024-11-27 NOTE — LETTER
11/27/2024      Jonn Watson  [address] [place] [address]      Dear Colleague,    Thank you for referring your patient, Jonn Watson, to the [facility]. Please see a copy of my visit note below.    Chief Complaint   Patient presents with     Annual Eye Exam      Pt injured right eye as a child and barely had vision in that eye since then    Last Eye Exam: 2 years ago   Dilated Previously: Yes, side effects of dilation explained today    What are you currently using to see?  readers       Distance Vision Acuity: Noticed gradual change in both eyes    Near Vision Acuity: Satisfied with vision while reading and using computer with readers    Eye Comfort: dry and burning   Do you use eye drops? : No      Lynda Mullen - Optometric Assistant           Medical, surgical and family histories reviewed and updated 11/27/2024.     Injury to R eye poked with a pencil  OBJECTIVE: See Ophthalmology exam    ASSESSMENT:    ICD-10-CM    1. Astigmatism of both eyes, unspecified type  H52.203       2. Presbyopia  H52.4       3. Dry eye  H04.129       4. Keratitis sicca, left eye  H16.222       5. Corneal scar, right eye  H17.9       6. Low vision of right eye  H54.511A         may have some amblyopia as there is not retinal involvement and mild corneal scarring  PLAN:   Systane PF q1-2h for a couple days , then as needed  Warm compresses/ lid hygiene, full time glasses wear  Systane and vitamin samples blink nutri tears         Rosibel Wilks OD     Again, thank you for allowing me to participate in the care of your patient.        Sincerely,        Rosibel Wilks, OD

## 2025-03-16 ENCOUNTER — PATIENT OUTREACH (OUTPATIENT)
Dept: CARE COORDINATION | Facility: CLINIC | Age: 65
End: 2025-03-16
Payer: COMMERCIAL

## 2025-06-06 ENCOUNTER — APPOINTMENT (OUTPATIENT)
Dept: CT IMAGING | Facility: CLINIC | Age: 65
End: 2025-06-06
Attending: EMERGENCY MEDICINE
Payer: COMMERCIAL

## 2025-06-06 ENCOUNTER — HOSPITAL ENCOUNTER (EMERGENCY)
Facility: CLINIC | Age: 65
Discharge: HOME | End: 2025-06-07
Attending: EMERGENCY MEDICINE | Admitting: EMERGENCY MEDICINE
Payer: COMMERCIAL

## 2025-06-06 VITALS
OXYGEN SATURATION: 97 % | BODY MASS INDEX: 27.75 KG/M2 | TEMPERATURE: 98.2 F | HEART RATE: 72 BPM | SYSTOLIC BLOOD PRESSURE: 182 MMHG | RESPIRATION RATE: 18 BRPM | WEIGHT: 169.31 LBS | DIASTOLIC BLOOD PRESSURE: 97 MMHG

## 2025-06-06 DIAGNOSIS — K57.92 DIVERTICULITIS: ICD-10-CM

## 2025-06-06 DIAGNOSIS — R03.0 ELEVATED BLOOD PRESSURE READING: ICD-10-CM

## 2025-06-06 LAB
ALBUMIN SERPL BCG-MCNC: 4.3 G/DL (ref 3.5–5.2)
ALBUMIN UR-MCNC: 30 MG/DL
ALP SERPL-CCNC: 114 U/L (ref 40–150)
ALT SERPL W P-5'-P-CCNC: 24 U/L (ref 0–50)
ANION GAP SERPL CALCULATED.3IONS-SCNC: 11 MMOL/L (ref 7–15)
APPEARANCE UR: CLEAR
AST SERPL W P-5'-P-CCNC: 42 U/L (ref 0–45)
BACTERIA #/AREA URNS HPF: ABNORMAL /HPF
BASOPHILS # BLD AUTO: 0 10E3/UL (ref 0–0.2)
BASOPHILS NFR BLD AUTO: 0 %
BILIRUB SERPL-MCNC: 0.3 MG/DL
BILIRUB UR QL STRIP: NEGATIVE
BUN SERPL-MCNC: 12.9 MG/DL (ref 8–23)
CALCIUM SERPL-MCNC: 9.2 MG/DL (ref 8.8–10.4)
CHLORIDE SERPL-SCNC: 103 MMOL/L (ref 98–107)
COLOR UR AUTO: ABNORMAL
CREAT SERPL-MCNC: 0.79 MG/DL (ref 0.51–0.95)
EGFRCR SERPLBLD CKD-EPI 2021: 84 ML/MIN/1.73M2
EOSINOPHIL # BLD AUTO: 0.2 10E3/UL (ref 0–0.7)
EOSINOPHIL NFR BLD AUTO: 2 %
ERYTHROCYTE [DISTWIDTH] IN BLOOD BY AUTOMATED COUNT: 13.8 % (ref 10–15)
GLUCOSE SERPL-MCNC: 85 MG/DL (ref 70–99)
GLUCOSE UR STRIP-MCNC: NEGATIVE MG/DL
HCO3 SERPL-SCNC: 25 MMOL/L (ref 22–29)
HCT VFR BLD AUTO: 42.7 % (ref 35–47)
HGB BLD-MCNC: 14.4 G/DL (ref 11.7–15.7)
HGB UR QL STRIP: NEGATIVE
HOLD SPECIMEN: NORMAL
HOLD SPECIMEN: NORMAL
IMM GRANULOCYTES # BLD: 0 10E3/UL
IMM GRANULOCYTES NFR BLD: 0 %
KETONES UR STRIP-MCNC: NEGATIVE MG/DL
LEUKOCYTE ESTERASE UR QL STRIP: NEGATIVE
LIPASE SERPL-CCNC: 57 U/L (ref 13–60)
LYMPHOCYTES # BLD AUTO: 2 10E3/UL (ref 0.8–5.3)
LYMPHOCYTES NFR BLD AUTO: 22 %
MCH RBC QN AUTO: 29.9 PG (ref 26.5–33)
MCHC RBC AUTO-ENTMCNC: 33.7 G/DL (ref 31.5–36.5)
MCV RBC AUTO: 89 FL (ref 78–100)
MONOCYTES # BLD AUTO: 0.5 10E3/UL (ref 0–1.3)
MONOCYTES NFR BLD AUTO: 6 %
MUCOUS THREADS #/AREA URNS LPF: PRESENT /LPF
NEUTROPHILS # BLD AUTO: 6.2 10E3/UL (ref 1.6–8.3)
NEUTROPHILS NFR BLD AUTO: 70 %
NITRATE UR QL: NEGATIVE
NRBC # BLD AUTO: 0 10E3/UL
NRBC BLD AUTO-RTO: 0 /100
PH UR STRIP: 6.5 [PH] (ref 5–7)
PLATELET # BLD AUTO: 184 10E3/UL (ref 150–450)
POTASSIUM SERPL-SCNC: 4.3 MMOL/L (ref 3.4–5.3)
PROT SERPL-MCNC: 7.4 G/DL (ref 6.4–8.3)
RBC # BLD AUTO: 4.82 10E6/UL (ref 3.8–5.2)
RBC URINE: 1 /HPF
SODIUM SERPL-SCNC: 139 MMOL/L (ref 135–145)
SP GR UR STRIP: 1.01 (ref 1–1.03)
SQUAMOUS EPITHELIAL: 1 /HPF
TROPONIN T SERPL HS-MCNC: 9 NG/L
UROBILINOGEN UR STRIP-MCNC: NORMAL MG/DL
WBC # BLD AUTO: 8.9 10E3/UL (ref 4–11)
WBC URINE: 3 /HPF

## 2025-06-06 PROCEDURE — 93005 ELECTROCARDIOGRAM TRACING: CPT

## 2025-06-06 PROCEDURE — 250N000011 HC RX IP 250 OP 636: Mod: JZ | Performed by: EMERGENCY MEDICINE

## 2025-06-06 PROCEDURE — 36415 COLL VENOUS BLD VENIPUNCTURE: CPT | Performed by: EMERGENCY MEDICINE

## 2025-06-06 PROCEDURE — 84484 ASSAY OF TROPONIN QUANT: CPT | Performed by: EMERGENCY MEDICINE

## 2025-06-06 PROCEDURE — 250N000011 HC RX IP 250 OP 636: Performed by: EMERGENCY MEDICINE

## 2025-06-06 PROCEDURE — 96374 THER/PROPH/DIAG INJ IV PUSH: CPT | Mod: 59

## 2025-06-06 PROCEDURE — 81001 URINALYSIS AUTO W/SCOPE: CPT | Performed by: EMERGENCY MEDICINE

## 2025-06-06 PROCEDURE — 99285 EMERGENCY DEPT VISIT HI MDM: CPT | Mod: 25

## 2025-06-06 PROCEDURE — 74177 CT ABD & PELVIS W/CONTRAST: CPT

## 2025-06-06 PROCEDURE — 84450 TRANSFERASE (AST) (SGOT): CPT | Performed by: EMERGENCY MEDICINE

## 2025-06-06 PROCEDURE — 250N000009 HC RX 250: Performed by: EMERGENCY MEDICINE

## 2025-06-06 PROCEDURE — 250N000013 HC RX MED GY IP 250 OP 250 PS 637: Performed by: EMERGENCY MEDICINE

## 2025-06-06 PROCEDURE — 36416 COLLJ CAPILLARY BLOOD SPEC: CPT | Performed by: EMERGENCY MEDICINE

## 2025-06-06 PROCEDURE — 83690 ASSAY OF LIPASE: CPT | Performed by: EMERGENCY MEDICINE

## 2025-06-06 PROCEDURE — 85025 COMPLETE CBC W/AUTO DIFF WBC: CPT | Performed by: EMERGENCY MEDICINE

## 2025-06-06 RX ORDER — IOPAMIDOL 755 MG/ML
500 INJECTION, SOLUTION INTRAVASCULAR ONCE
Status: COMPLETED | OUTPATIENT
Start: 2025-06-06 | End: 2025-06-06

## 2025-06-06 RX ORDER — KETOROLAC TROMETHAMINE 15 MG/ML
10 INJECTION, SOLUTION INTRAMUSCULAR; INTRAVENOUS ONCE
Status: COMPLETED | OUTPATIENT
Start: 2025-06-06 | End: 2025-06-06

## 2025-06-06 RX ORDER — CEFDINIR 300 MG/1
300 CAPSULE ORAL 2 TIMES DAILY
Qty: 13 CAPSULE | Refills: 0 | Status: SHIPPED | OUTPATIENT
Start: 2025-06-06 | End: 2025-06-13

## 2025-06-06 RX ORDER — METRONIDAZOLE 500 MG/1
500 TABLET ORAL 2 TIMES DAILY
Qty: 13 TABLET | Refills: 0 | Status: SHIPPED | OUTPATIENT
Start: 2025-06-06 | End: 2025-06-13

## 2025-06-06 RX ORDER — CEFDINIR 300 MG/1
300 CAPSULE ORAL ONCE
Status: COMPLETED | OUTPATIENT
Start: 2025-06-06 | End: 2025-06-06

## 2025-06-06 RX ORDER — METRONIDAZOLE 500 MG/1
500 TABLET ORAL ONCE
Status: COMPLETED | OUTPATIENT
Start: 2025-06-06 | End: 2025-06-06

## 2025-06-06 RX ADMIN — IOPAMIDOL 85 ML: 755 INJECTION, SOLUTION INTRAVENOUS at 21:10

## 2025-06-06 RX ADMIN — METRONIDAZOLE 500 MG: 500 TABLET ORAL at 23:53

## 2025-06-06 RX ADMIN — SODIUM CHLORIDE 61 ML: 9 INJECTION, SOLUTION INTRAVENOUS at 21:11

## 2025-06-06 RX ADMIN — KETOROLAC TROMETHAMINE 10 MG: 15 INJECTION, SOLUTION INTRAMUSCULAR; INTRAVENOUS at 20:36

## 2025-06-06 RX ADMIN — CEFDINIR 300 MG: 300 CAPSULE ORAL at 23:53

## 2025-06-06 ASSESSMENT — COLUMBIA-SUICIDE SEVERITY RATING SCALE - C-SSRS
2. HAVE YOU ACTUALLY HAD ANY THOUGHTS OF KILLING YOURSELF IN THE PAST MONTH?: NO
1. IN THE PAST MONTH, HAVE YOU WISHED YOU WERE DEAD OR WISHED YOU COULD GO TO SLEEP AND NOT WAKE UP?: NO
6. HAVE YOU EVER DONE ANYTHING, STARTED TO DO ANYTHING, OR PREPARED TO DO ANYTHING TO END YOUR LIFE?: NO

## 2025-06-06 ASSESSMENT — ACTIVITIES OF DAILY LIVING (ADL)
ADLS_ACUITY_SCORE: 41

## 2025-06-07 NOTE — ED TRIAGE NOTES
Right sided abdominal pain and flank pain that started on Tuesday. Patient reports hx of kidney stones. Hypertensive in triage.

## 2025-06-07 NOTE — ED PROVIDER NOTES
Emergency Department Note      History of Present Illness     Chief Complaint   Abdominal Pain and Flank Pain    HPI   Jonn Watson is a 65 year old female with a history of hypertension and kidney stones who presents to the ED for evaluation of abdominal and flank pain. The patient reports an onset of right-sided flank pain on Tuesday, 6/3/25, while riding her tractor. She states that the pain worsened Tuesday evening and that she noticed the pain again while riding her tractor today causing her to double over. Patient adds that she has felt nauseous for the past few days, and does have a history of kidney stones and appendectomy.  Notes felt more pain in the back with the prior kidney stones.  She endorses right flank pain, back pain, nausea, chills, and urinary urgency. Patient denies left flank pain, chest pain, hematuria, hematochezia, or burning with urination. The patient has not taken any medications for the pain.     Independent Historian   None    Review of External Notes   Urgent care today, referred to ED, history consistent with above    Past Medical History     Medical History and Problem List   Anemia  Anxiety  Arthritis  Bladder infection  Bronchitis  Depression  Thyroid disease  Endometriosis  GERD  Hiatal hernia  Hearing loss  Hypertension  Iron deficiency  Kidney stone    Medications   Tenormin  Voltaren  Glucosamine MSM Complex  Atarax  Synthroid/Levothroid  Ativan  Prilosec OTC  Zoloft  Kenalog  Valtrex    Surgical History   Appendectomy  Arthrodesis toes  Colonoscopy  Combined cystoscopy and ureteroscopy  Operative hysterectomy  GI surgery, kidney stones  Laparoscopy diagnostic      Physical Exam     Patient Vitals for the past 24 hrs:   BP Temp Temp src Pulse Resp SpO2 Weight   06/06/25 2300 (!) 182/97 -- -- 72 18 97 % --   06/06/25 2014 (!) 211/107 -- -- -- -- -- --   06/06/25 2013 -- 98.2  F (36.8  C) Temporal 79 16 100 % --   06/06/25 2012 -- -- -- -- -- -- 76.8 kg (169 lb 5 oz)      Physical Exam  Eyes:               Sclera white; Pupils are equal and round  ENT:                External ears and nares normal  CV:                  Rate as above with regular rhythm   Resp:               Non-labored, no retractions or accessory muscle use  GI:                   Abdomen is soft, RLQ tenderness                          No rebound tenderness or peritoneal features  MS:                  Moves all extremities  Skin:                Warm and dry  Neuro:             Speech is normal and fluent. No apparent deficit.    Diagnostics     Lab Results   Labs Ordered and Resulted from Time of ED Arrival to Time of ED Departure   ROUTINE UA WITH MICROSCOPIC REFLEX TO CULTURE - Abnormal       Result Value    Color Urine Light Yellow      Appearance Urine Clear      Glucose Urine Negative      Bilirubin Urine Negative      Ketones Urine Negative      Specific Gravity Urine 1.015      Blood Urine Negative      pH Urine 6.5      Protein Albumin Urine 30 (*)     Urobilinogen Urine Normal      Nitrite Urine Negative      Leukocyte Esterase Urine Negative      Bacteria Urine Few (*)     Mucus Urine Present (*)     RBC Urine 1      WBC Urine 3      Squamous Epithelials Urine 1     COMPREHENSIVE METABOLIC PANEL - Normal    Sodium 139      Potassium 4.3      Carbon Dioxide (CO2) 25      Anion Gap 11      Urea Nitrogen 12.9      Creatinine 0.79      GFR Estimate 84      Calcium 9.2      Chloride 103      Glucose 85      Alkaline Phosphatase 114      AST 42      ALT 24      Protein Total 7.4      Albumin 4.3      Bilirubin Total 0.3     LIPASE - Normal    Lipase 57     TROPONIN T, HIGH SENSITIVITY - Normal    Troponin T, High Sensitivity 9     CBC WITH PLATELETS AND DIFFERENTIAL    WBC Count 8.9      RBC Count 4.82      Hemoglobin 14.4      Hematocrit 42.7      MCV 89      MCH 29.9      MCHC 33.7      RDW 13.8      Platelet Count 184      % Neutrophils 70      % Lymphocytes 22      % Monocytes 6      % Eosinophils 2       % Basophils 0      % Immature Granulocytes 0      NRBCs per 100 WBC 0      Absolute Neutrophils 6.2      Absolute Lymphocytes 2.0      Absolute Monocytes 0.5      Absolute Eosinophils 0.2      Absolute Basophils 0.0      Absolute Immature Granulocytes 0.0      Absolute NRBCs 0.0         Imaging   CT Abdomen Pelvis w Contrast   Final Result   IMPRESSION:    1.  Cecal diverticulitis without fluid collections.   2.  SMA stenosis redemonstrated.   3.  Additional chronic findings as above          EKG   ECG taken at 2022, ECG read at 2033  Normal sinus rhythm  Possible left atrial enlargement  Nonspecific ST abnormality   Abnormal ECG  New TWI V1 as compared to prior, dated 2/14/23.  Rate 69 bpm. MO interval 140 ms. QRS duration 80 ms. QT/QTc 392/420 ms. P-R-T axes 46 46 39.    Independent Interpretation   None    ED Course      Medications Administered   Medications   cefdinir (OMNICEF) capsule 300 mg (has no administration in time range)   metroNIDAZOLE (FLAGYL) tablet 500 mg (has no administration in time range)   ketorolac (TORADOL) injection 10 mg (10 mg Intravenous $Given 6/6/25 2036)   iopamidol (ISOVUE-370) solution 500 mL (85 mLs Intravenous $Given 6/6/25 2110)   sodium chloride 0.9 % bag for CT scan flush (61 mLs Intravenous $Given 6/6/25 2111)       Procedures   Procedures     Discussion of Management   None    ED Course   ED Course as of 06/06/25 2345 Fri Jun 06, 2025 2025 I obtained history and examined the patient as noted above.        Additional Documentation  None    Medical Decision Making / Diagnosis     CMS Diagnoses: None    MIPS   None    MDM   Jonn Watson is a 65 year old female who presents with right lower quadrant abdominal pain.  Differential includes renal stone, diverticulitis, appendicitis, and pelvic pathology.  CT confirms diverticulitis without abscess or perforation.  Pain is controlled and she is able to tolerate oral intake.  Work up demonstrated no associated sepsis.  Outpatient  management is appropriate.  Prescribed antibiotics.  I recommended she return to the ED for worsening pain, fever, vomiting, bloody or black stools, or for any other concerning symptoms.     Regarding the elevated BP, there is no chest pain or headache to suggest end organ dysfunction.  Renal function was normal.  She has a blood pressure cuff at home and will check it over the weekend the next two days.  If persistently elevated she can increase atenolol from 50 to 100.  She will need close follow-up in clinic for reassessment.     Disposition   The patient was discharged.     Diagnosis     ICD-10-CM    1. Diverticulitis  K57.92       2. Elevated blood pressure reading  R03.0            Discharge Medications   New Prescriptions    CEFDINIR (OMNICEF) 300 MG CAPSULE    Take 1 capsule (300 mg) by mouth 2 times daily for 7 days.    METRONIDAZOLE (FLAGYL) 500 MG TABLET    Take 1 tablet (500 mg) by mouth 2 times daily for 7 days.         Scribe Disclosure:  I, Debora Soto, am serving as a scribe at 8:41 PM on 6/6/2025 to document services personally performed by Cecily Beckett MD based on my observations and the provider's statements to me.        Cecily Beckett MD  06/07/25 4941

## 2025-06-09 LAB
ATRIAL RATE - MUSE: 69 BPM
DIASTOLIC BLOOD PRESSURE - MUSE: NORMAL MMHG
INTERPRETATION ECG - MUSE: NORMAL
P AXIS - MUSE: 46 DEGREES
PR INTERVAL - MUSE: 140 MS
QRS DURATION - MUSE: 80 MS
QT - MUSE: 392 MS
QTC - MUSE: 420 MS
R AXIS - MUSE: 46 DEGREES
SYSTOLIC BLOOD PRESSURE - MUSE: NORMAL MMHG
T AXIS - MUSE: 39 DEGREES
VENTRICULAR RATE- MUSE: 69 BPM

## 2025-06-24 ENCOUNTER — VIRTUAL VISIT (OUTPATIENT)
Dept: PEDIATRICS | Facility: CLINIC | Age: 65
End: 2025-06-24
Payer: COMMERCIAL

## 2025-06-24 DIAGNOSIS — I10 ESSENTIAL HYPERTENSION: ICD-10-CM

## 2025-06-24 DIAGNOSIS — Z87.19 HX OF DIVERTICULITIS OF COLON: Primary | ICD-10-CM

## 2025-06-24 DIAGNOSIS — F41.1 GAD (GENERALIZED ANXIETY DISORDER): ICD-10-CM

## 2025-06-24 DIAGNOSIS — K76.0 HEPATIC STEATOSIS: ICD-10-CM

## 2025-06-24 DIAGNOSIS — F41.9 ANXIETY: ICD-10-CM

## 2025-06-24 PROCEDURE — 1126F AMNT PAIN NOTED NONE PRSNT: CPT | Performed by: STUDENT IN AN ORGANIZED HEALTH CARE EDUCATION/TRAINING PROGRAM

## 2025-06-24 PROCEDURE — 98006 SYNCH AUDIO-VIDEO EST MOD 30: CPT | Performed by: STUDENT IN AN ORGANIZED HEALTH CARE EDUCATION/TRAINING PROGRAM

## 2025-06-24 RX ORDER — SERTRALINE HYDROCHLORIDE 100 MG/1
200 TABLET, FILM COATED ORAL DAILY
Qty: 180 TABLET | Refills: 1 | Status: SHIPPED | OUTPATIENT
Start: 2025-06-24

## 2025-06-24 ASSESSMENT — PATIENT HEALTH QUESTIONNAIRE - PHQ9: SUM OF ALL RESPONSES TO PHQ QUESTIONS 1-9: 4

## 2025-06-24 NOTE — PROGRESS NOTES
Jonn is a 65 year old who is being evaluated via a billable video visit.    How would you like to obtain your AVS? MyChart  If the video visit is dropped, the invitation should be resent by: Text to cell phone: [phone]  Will anyone else be joining your video visit? No      Assessment & Plan     Hx of diverticulitis of colon  Was recently seen in ER on 6/6 for abdominal pain and found to have diverticulitis.  Overall feeling well and symptoms have resolved.  BMs back in the normal range and normal appetite.  Due for repeat colonoscopy, but this has been delayed due to financial concerns   - Primary Care - Care Coordination Referral; Future    Hypertension  Blood pressure significantly elevated in the ER.  No concerning associated symptoms.  Suspect that this was at least in part due to anxiety and pain.  Has been checking bps at home and they have been variable.  Will schedule for nurse only visit for repeat and to validate home cuff.  She is on atenolol.  Did not tolerate lisinopril or losartan.  Would consider increasing atenolol vs adding amlodipine for blood pressure control    KVNG (generalized anxiety disorder)  Notes that this is ongoing and has been feeling a bit more anxious recently.  Works with a psychiatrist.  No therapist currently.  Would potentially be interested in a therapy referral in the future     Hepatic steatosis   Noted on CT.  Normal LFTs, problem list updated      I spent a total of 31 minutes on the day of the visit.   Time spent by me today doing chart review, history and exam, documentation and further activities per the note    The longitudinal plan of care for the diagnosis(es)/condition(s) as documented were addressed during this visit. Due to the added complexity in care, I will continue to support Jonn in the subsequent management and with ongoing continuity of care.    MED REC REQUIRED  Post Medication Reconciliation Status:  Discharge medications reconciled, continue medications  "without change  BMI  Estimated body mass index is 27.75 kg/m  as calculated from the following:    Height as of 6/6/25: 1.664 m (5' 5.5\").    Weight as of 6/6/25: 76.8 kg (169 lb 5 oz).             Melinda Lunsford is a 65 year old, presenting for the following health issues:  ER F/U      6/24/2025     7:13 AM   Additional Questions   Roomed by BB VF   Accompanied by SELF         6/24/2025     7:13 AM   Patient Reported Additional Medications   Patient reports taking the following new medications None     HPI        ER Follow-up Visit:    Hospital/Nursing Home/IP Rehab Facility: [facility]  Most Recent Admission Date: 6/6/2025   Most Recent Admission Diagnosis:      Most Recent Discharge Date: 6/7/2025   Most Recent Discharge Diagnosis: Diverticulitis - K57.92  Elevated blood pressure reading - R03.0   Do you have any other stressors you would like to discuss with your provider? Financial Concerns    Problems taking medications regularly:  None  Medication changes since discharge: None  Problems adhering to non-medication therapy:  None    Summary of hospitalization:  ER note reviewed  Diagnostic Tests/Treatments reviewed.  Follow up needed: none  Other Healthcare Providers Involved in Patient s Care:         None  Update since discharge: improved.     Feeling much better    BMs are back to normal     Plan of care communicated with patient    Normal appetite  - trying to eat earlier but otherwise back to eating normal diet   - trying to eat yogurt in the morning     Delayed scheduling colonoscopy due to medical bills       Checking blood pressure at home and have been variable     Recent bp  - 182/94- was feeling anxious in the morning   -138/73  -154/84  -115/62  -164/84      Generally lower in the evenings                         Objective    Vitals - Patient Reported  Systolic (Patient Reported): (!) 182  Diastolic (Patient Reported): (!) 94  Pain Score: No Pain (0)        Physical Exam "   GENERAL: alert and no distress  EYES: Eyes grossly normal to inspection.  No discharge or erythema, or obvious scleral/conjunctival abnormalities.  RESP: No audible wheeze, cough, or visible cyanosis.    SKIN: Visible skin clear. No significant rash, abnormal pigmentation or lesions.  NEURO: Cranial nerves grossly intact.  Mentation and speech appropriate for age.  PSYCH: Appropriate affect, tone, and pace of words          Video-Visit Details    Type of service:  Video Visit   Originating Location (pt. Location): Home    Distant Location (provider location):  On-site  Platform used for Video Visit: Tiffanie  Signed Electronically by: Cathleen Alex MD    .undefined[^^

## 2025-06-25 ENCOUNTER — PATIENT OUTREACH (OUTPATIENT)
Dept: CARE COORDINATION | Facility: CLINIC | Age: 65
End: 2025-06-25
Payer: COMMERCIAL

## 2025-06-25 NOTE — LETTER
[facility] CARE COORDINATION  [address]  [place] [address]    June 26, 2025    Jonn Watson  [address] [place] [address]      Dear Jonn,    I am a clinic community health worker who works with Cathleen Alex MD with the [facility]. I have been trying to reach you recently to introduce Clinic Care Coordination. Below is a description of clinic care coordination and how I can further assist you.       The clinic care coordination team is made up of a registered nurse, , financial resource worker and community health worker who understand the health care system. The goal of clinic care coordination is to help you manage your health and improve access to the health care system. Our team works alongside your provider to assist you in determining your health and social needs. We can help you obtain health care and community resources, providing you with necessary information and education. We can work with you through any barriers and develop a care plan that helps coordinate and strengthen the communication between you and your care team.  Our services are voluntary and are offered without charge to you personally.    Please feel free to contact me with any questions or concerns regarding care coordination and what we can offer.      We are focused on providing you with the highest-quality healthcare experience possible.    Sincerely,     Rosalinda Farah CLYDE, B.S. Public Health  Clinic Care Coordination  [facility]:  [place], [place] and [place]  [phone]  [email or web address]

## 2025-06-25 NOTE — PROGRESS NOTES
Clinic Care Coordination Contact  [facility]/Mary    Clinical Data: Care Coordinator Outreach    Outreach Documentation Number of Outreach Attempt   6/25/2025  12:11 PM 1       Unable to leave a message due to: Voicemail is full.      Plan: Care Coordinator will try to reach patient again in 1-2 business days.    Rosalinda CASPER, B.S. [facility] Care Coordination  [facility] Clinics:  [place], [place] and [place]  [phone]  [email or web address]

## 2025-06-26 NOTE — PROGRESS NOTES
Clinic Care Coordination Contact  [facility]/Voicemail    Clinical Data: Care Coordinator Outreach    Outreach Documentation Number of Outreach Attempt   6/25/2025  12:11 PM 1   6/26/2025   2:38 PM 2       Left message on patient's voicemail with call back information and requested return call.      Plan: Care Coordinator will send care coordination introduction letter with care coordinator contact information and explanation of care coordination services via [employer]. Care Coordinator will do no further outreaches at this time.    Rosalinda CASPER, B.S. [facility] Care Coordination  [facility] Clinics:  [place], [place] and [place]  [phone]  [email or web address]

## 2025-08-07 ENCOUNTER — TELEPHONE (OUTPATIENT)
Dept: PEDIATRICS | Facility: CLINIC | Age: 65
End: 2025-08-07
Payer: COMMERCIAL